# Patient Record
Sex: MALE | Race: BLACK OR AFRICAN AMERICAN | NOT HISPANIC OR LATINO | Employment: OTHER | URBAN - METROPOLITAN AREA
[De-identification: names, ages, dates, MRNs, and addresses within clinical notes are randomized per-mention and may not be internally consistent; named-entity substitution may affect disease eponyms.]

---

## 2020-03-05 ENCOUNTER — OFFICE VISIT (OUTPATIENT)
Dept: DERMATOLOGY | Facility: CLINIC | Age: 75
End: 2020-03-05
Payer: COMMERCIAL

## 2020-03-05 DIAGNOSIS — Z13.89 SCREENING FOR SKIN CONDITION: ICD-10-CM

## 2020-03-05 DIAGNOSIS — D22.9 NEVUS: ICD-10-CM

## 2020-03-05 DIAGNOSIS — D23.9 DERMATOFIBROMA: Primary | ICD-10-CM

## 2020-03-05 PROCEDURE — 99203 OFFICE O/P NEW LOW 30 MIN: CPT | Performed by: DERMATOLOGY

## 2020-03-05 RX ORDER — ALBUTEROL SULFATE 90 UG/1
2 AEROSOL, METERED RESPIRATORY (INHALATION) EVERY 6 HOURS PRN
COMMUNITY

## 2020-03-05 RX ORDER — BUDESONIDE AND FORMOTEROL FUMARATE DIHYDRATE 160; 4.5 UG/1; UG/1
2 AEROSOL RESPIRATORY (INHALATION) 2 TIMES DAILY
COMMUNITY

## 2020-03-05 NOTE — PATIENT INSTRUCTIONS
Dermatofibroma right lower leg versus nevus that appears to be regular shape and color these are growths that appear after injury may be related to trauma no further treatment needed unless further changes noted  Nevi reviewed the concept of ABCDE and ugly duckling nothing markedly atypical patient reassured  Screening for Dermatologic Disorders: Nothing else of concern noted on complete exam follow up in 1 year   Patient advised to return follow-up if any changes noted

## 2020-03-05 NOTE — PROGRESS NOTES
[address] [facility] [address]  [phone]  [phone]     MRN: [id number] : 1945  Encounter: [id number]  Patient Information: Mary Jeronimo  Chief complaint:  Skin lesions    History of present illness:  62-year-old male presents because of concerns regarding spots on his skin patient not sure how long he has had these no specific concerns or changes noted  No past medical history on file  No past surgical history on file  Social History   Social History     Substance and Sexual Activity   Alcohol Use Not Currently     Social History     Substance and Sexual Activity   Drug Use Not Currently     Social History     Tobacco Use   Smoking Status Former Smoker    Packs/day: 0 25    Years: 10 00    Pack years: 2 50    Types: Cigarettes    Last attempt to quit: 3/5/1990    Years since quittin 0   Smokeless Tobacco Never Used     No family history on file  Meds/Allergies   No Known Allergies    Meds:  Prior to Admission medications    Medication Sig Start Date End Date Taking? Authorizing Provider   albuterol (PROVENTIL HFA,VENTOLIN HFA) 90 mcg/act inhaler Inhale 2 puffs every 6 (six) hours as needed for wheezing   Yes Historical Provider, MD   budesonide-formoterol (SYMBICORT) 160-4 5 mcg/act inhaler Inhale 2 puffs 2 (two) times a day Rinse mouth after use     Yes Historical Provider, MD   CETIRIZINE HCL PO Take by mouth   Yes Historical Provider, MD       Subjective:     Review of Systems:    General: negative for - chills, fatigue, fever,  weight gain or weight loss  Psychological: negative for - anxiety, behavioral disorder, concentration difficulties, decreased libido, depression, irritability, memory difficulties, mood swings, sleep disturbances or suicidal ideation  ENT: negative for - hearing difficulties , nasal congestion, nasal discharge, oral lesions, sinus pain, sneezing, sore throat  Allergy and Immunology: negative for - hives, insect bite sensitivity,  Hematological and Lymphatic: negative for - bleeding problems, blood clots,bruising, swollen lymph nodes  Endocrine: negative for - hair pattern changes, hot flashes, malaise/lethargy, mood swings, palpitations, polydipsia/polyuria, skin changes, temperature intolerance or unexpected weight change  Respiratory: negative for - cough, hemoptysis, orthopnea, shortness of breath, or wheezing  Cardiovascular: negative for - chest pain, dyspnea on exertion, edema,  Gastrointestinal: negative for - abdominal pain, nausea/vomiting  Genito-Urinary: negative for - dysuria, incontinence, irregular/heavy menses or urinary frequency/urgency  Musculoskeletal: negative for - gait disturbance, joint pain, joint stiffness, joint swelling, muscle pain, muscular weakness  Dermatological:  As in HPI  Neurological: negative for confusion, dizziness, headaches, impaired coordination/balance, memory loss, numbness/tingling, seizures, speech problems, tremors or weakness       Objective: There were no vitals taken for this visit  Physical Exam:    General Appearance:    Alert, cooperative, no distress   Head:    Normocephalic, without obvious abnormality, atraumatic           Skin:   A full skin exam was performed including scalp, head scalp, eyes, ears, nose, lips, neck, chest, axilla, abdomen, back, buttocks, bilateral upper extremities, bilateral lower extremities, hands, feet, fingers, toes, fingernails, and toenails 7 mm hyperpigmented nodule noted on the right thigh also a 6 mm pigmented area noted scalp with normal shape color nothing else remarkable noted exam     Assessment:     1  Dermatofibroma     2  Nevus     3   Screening for skin condition           Plan:   Dermatofibroma right lower leg versus nevus that appears to be regular shape and color these are growths that appear after injury may be related to trauma no further treatment needed unless further changes noted  Nevi reviewed the concept of ABCDE and ugly duckling nothing markedly atypical patient reassured  Screening for Dermatologic Disorders: Nothing else of concern noted on complete exam follow up in 1 year   Patient advised to return follow-up if any changes noted    Stephen Lira MD  3/5/2020,11:58 AM    Portions of the record may have been created with voice recognition software   Occasional wrong word or "sound a like" substitutions may have occurred due to the inherent limitations of voice recognition software   Read the chart carefully and recognize, using context, where substitutions have occurred

## 2021-04-01 ENCOUNTER — OFFICE VISIT (OUTPATIENT)
Dept: DERMATOLOGY | Facility: CLINIC | Age: 76
End: 2021-04-01
Payer: OTHER GOVERNMENT

## 2021-04-01 VITALS — TEMPERATURE: 97.3 F

## 2021-04-01 DIAGNOSIS — D23.39 DILATED PORE OF WINER OF NOSE: ICD-10-CM

## 2021-04-01 DIAGNOSIS — Z13.89 SCREENING FOR SKIN CONDITION: ICD-10-CM

## 2021-04-01 DIAGNOSIS — L80 VITILIGO: Primary | ICD-10-CM

## 2021-04-01 DIAGNOSIS — D23.9 DERMATOFIBROMA: ICD-10-CM

## 2021-04-01 PROCEDURE — 99213 OFFICE O/P EST LOW 20 MIN: CPT | Performed by: DERMATOLOGY

## 2021-04-01 NOTE — PROGRESS NOTES
[facility] DERMATOLOGY  [address] [place] [address]  [phone]  [phone]     MRN: [id number] : 1945  Encounter: [id number]  Patient Information: Melissa Bragg  Chief complaint:  Multiple concerns    History of present illness:  62-year-old male presents for overall skin check concerns regarding a area on his nose that his basically been present for awhile that he is able to squeeze out some material but does not go away also concerned regarding growth on his abdomen and leg at that have been present for awhile that of pigmentation and also concerned regarding loss of pigmentation on his back which has been itching somewhat  No past medical history on file  No past surgical history on file  Social History   Social History     Substance and Sexual Activity   Alcohol Use Not Currently     Social History     Substance and Sexual Activity   Drug Use Not Currently     Social History     Tobacco Use   Smoking Status Former Smoker    Packs/day: 0 25    Years: 10 00    Pack years: 2 50    Types: Cigarettes    Quit date: 3/5/1990    Years since quittin 0   Smokeless Tobacco Never Used     No family history on file  Meds/Allergies   No Known Allergies    Meds:  Prior to Admission medications    Medication Sig Start Date End Date Taking? Authorizing Provider   albuterol (PROVENTIL HFA,VENTOLIN HFA) 90 mcg/act inhaler Inhale 2 puffs every 6 (six) hours as needed for wheezing   Yes Historical Provider, MD   budesonide-formoterol (SYMBICORT) 160-4 5 mcg/act inhaler Inhale 2 puffs 2 (two) times a day Rinse mouth after use     Yes Historical Provider, MD   CETIRIZINE HCL PO Take by mouth   Yes Historical Provider, MD       Subjective:     Review of Systems:    General: negative for - chills, fatigue, fever,  weight gain or weight loss  Psychological: negative for - anxiety, behavioral disorder, concentration difficulties, decreased libido, depression, irritability, memory difficulties, mood swings, sleep disturbances or suicidal ideation  ENT: negative for - hearing difficulties , nasal congestion, nasal discharge, oral lesions, sinus pain, sneezing, sore throat  Allergy and Immunology: negative for - hives, insect bite sensitivity,  Hematological and Lymphatic: negative for - bleeding problems, blood clots,bruising, swollen lymph nodes  Endocrine: negative for - hair pattern changes, hot flashes, malaise/lethargy, mood swings, palpitations, polydipsia/polyuria, skin changes, temperature intolerance or unexpected weight change  Respiratory: negative for - cough, hemoptysis, orthopnea, shortness of breath, or wheezing  Cardiovascular: negative for - chest pain, dyspnea on exertion, edema,  Gastrointestinal: negative for - abdominal pain, nausea/vomiting  Genito-Urinary: negative for - dysuria, incontinence, irregular/heavy menses or urinary frequency/urgency  Musculoskeletal: negative for - gait disturbance, joint pain, joint stiffness, joint swelling, muscle pain, muscular weakness  Dermatological:  As in HPI  Neurological: negative for confusion, dizziness, headaches, impaired coordination/balance, memory loss, numbness/tingling, seizures, speech problems, tremors or weakness       Objective:   Temp (!) 97 3 °F (36 3 °C) (Temporal)     Physical Exam:    General Appearance:    Alert, cooperative, no distress   Head:    Normocephalic, without obvious abnormality, atraumatic           Skin:   A full skin exam was performed including scalp, head scalp, eyes, ears, nose, lips, neck, chest, axilla, abdomen, back, buttocks, bilateral upper extremities, bilateral lower extremities, hands, feet, fingers, toes, fingernails, and toenails large open pore noted on the right nasal wall dome-shaped papules with hyperpigmentation with positive pinch sign hypopigmented area noted on the lower back without any rash noted     Assessment:     1  Vitiligo     2   Dermatofibroma 3  Dilated pore of Jero of nose     4  Screening for skin condition           Plan:   Vitiligo we discussed this process is an autoimmune condition at this point will hold off on therapy if it does seem to be bothersome or gets worse will go ahead treat with a topical steroid  Dermatofibroma patient again reassured these are normal growths no treatment needed  Dilated pore on the nose we discussed removal and consideration for complete removal if this is cosmetically bothersome  Screening for Dermatologic Disorders: Nothing else of concern noted on complete exam follow up in 1 year       Barrett Sanchez MD  4/1/2021,1:54 PM    Portions of the record may have been created with voice recognition software   Occasional wrong word or "sound a like" substitutions may have occurred due to the inherent limitations of voice recognition software   Read the chart carefully and recognize, using context, where substitutions have occurred

## 2021-04-01 NOTE — PATIENT INSTRUCTIONS
Vitiligo we discussed this process is an autoimmune condition at this point will hold off on therapy if it does seem to be bothersome or gets worse will go ahead treat with a topical steroid  Dermatofibroma patient again reassured these are normal growths no treatment needed  Dilated pore on the nose we discussed removal and consideration for complete removal if this is cosmetically bothersome  Screening for Dermatologic Disorders: Nothing else of concern noted on complete exam follow up in 1 year

## 2021-04-02 ENCOUNTER — TRANSCRIBE ORDERS (OUTPATIENT)
Dept: ADMINISTRATIVE | Facility: HOSPITAL | Age: 76
End: 2021-04-02

## 2021-04-02 DIAGNOSIS — R19.02 LEFT UPPER QUADRANT ABDOMINAL SWELLING, MASS AND LUMP: Primary | ICD-10-CM

## 2022-02-19 ENCOUNTER — APPOINTMENT (EMERGENCY)
Dept: CT IMAGING | Facility: HOSPITAL | Age: 77
End: 2022-02-19
Payer: COMMERCIAL

## 2022-02-19 ENCOUNTER — HOSPITAL ENCOUNTER (EMERGENCY)
Facility: HOSPITAL | Age: 77
Discharge: HOME | End: 2022-02-19
Attending: EMERGENCY MEDICINE
Payer: COMMERCIAL

## 2022-02-19 VITALS
BODY MASS INDEX: 26.2 KG/M2 | TEMPERATURE: 98.3 F | SYSTOLIC BLOOD PRESSURE: 137 MMHG | DIASTOLIC BLOOD PRESSURE: 77 MMHG | HEART RATE: 86 BPM | HEIGHT: 70 IN | OXYGEN SATURATION: 96 % | RESPIRATION RATE: 18 BRPM | WEIGHT: 183 LBS

## 2022-02-19 DIAGNOSIS — L03.211 FACIAL CELLULITIS: ICD-10-CM

## 2022-02-19 DIAGNOSIS — K12.2 CELLULITIS AND ABSCESS OF ORAL SOFT TISSUES: Primary | ICD-10-CM

## 2022-02-19 LAB
ANION GAP SERPL CALCULATED.3IONS-SCNC: 8 MMOL/L (ref 4–13)
BASOPHILS # BLD AUTO: 0.02 THOUSANDS/ΜL (ref 0–0.1)
BASOPHILS NFR BLD AUTO: 0 % (ref 0–1)
BUN SERPL-MCNC: 24 MG/DL (ref 5–25)
CALCIUM SERPL-MCNC: 8.7 MG/DL (ref 8.3–10.1)
CHLORIDE SERPL-SCNC: 104 MMOL/L (ref 100–108)
CO2 SERPL-SCNC: 27 MMOL/L (ref 21–32)
CREAT SERPL-MCNC: 1.06 MG/DL (ref 0.6–1.3)
EOSINOPHIL # BLD AUTO: 0.14 THOUSAND/ΜL (ref 0–0.61)
EOSINOPHIL NFR BLD AUTO: 2 % (ref 0–6)
ERYTHROCYTE [DISTWIDTH] IN BLOOD BY AUTOMATED COUNT: 14.6 % (ref 11.6–15.1)
GFR SERPL CREATININE-BSD FRML MDRD: 67 ML/MIN/1.73SQ M
GLUCOSE SERPL-MCNC: 104 MG/DL (ref 65–140)
HCT VFR BLD AUTO: 39.8 % (ref 36.5–49.3)
HGB BLD-MCNC: 12.5 G/DL (ref 12–17)
IMM GRANULOCYTES # BLD AUTO: 0.02 THOUSAND/UL (ref 0–0.2)
IMM GRANULOCYTES NFR BLD AUTO: 0 % (ref 0–2)
LYMPHOCYTES # BLD AUTO: 1.73 THOUSANDS/ΜL (ref 0.6–4.47)
LYMPHOCYTES NFR BLD AUTO: 21 % (ref 14–44)
MCH RBC QN AUTO: 23.3 PG (ref 26.8–34.3)
MCHC RBC AUTO-ENTMCNC: 31.4 G/DL (ref 31.4–37.4)
MCV RBC AUTO: 74 FL (ref 82–98)
MONOCYTES # BLD AUTO: 0.87 THOUSAND/ΜL (ref 0.17–1.22)
MONOCYTES NFR BLD AUTO: 10 % (ref 4–12)
NEUTROPHILS # BLD AUTO: 5.66 THOUSANDS/ΜL (ref 1.85–7.62)
NEUTS SEG NFR BLD AUTO: 67 % (ref 43–75)
NRBC BLD AUTO-RTO: 0 /100 WBCS
PLATELET # BLD AUTO: 193 THOUSANDS/UL (ref 149–390)
PMV BLD AUTO: 10.3 FL (ref 8.9–12.7)
POTASSIUM SERPL-SCNC: 3.6 MMOL/L (ref 3.5–5.3)
RBC # BLD AUTO: 5.37 MILLION/UL (ref 3.88–5.62)
SODIUM SERPL-SCNC: 139 MMOL/L (ref 136–145)
WBC # BLD AUTO: 8.44 THOUSAND/UL (ref 4.31–10.16)

## 2022-02-19 PROCEDURE — 87040 BLOOD CULTURE FOR BACTERIA: CPT | Performed by: EMERGENCY MEDICINE

## 2022-02-19 PROCEDURE — 99284 EMERGENCY DEPT VISIT MOD MDM: CPT

## 2022-02-19 PROCEDURE — 96365 THER/PROPH/DIAG IV INF INIT: CPT

## 2022-02-19 PROCEDURE — 36415 COLL VENOUS BLD VENIPUNCTURE: CPT | Performed by: EMERGENCY MEDICINE

## 2022-02-19 PROCEDURE — 10060 I&D ABSCESS SIMPLE/SINGLE: CPT | Performed by: EMERGENCY MEDICINE

## 2022-02-19 PROCEDURE — 85025 COMPLETE CBC W/AUTO DIFF WBC: CPT | Performed by: EMERGENCY MEDICINE

## 2022-02-19 PROCEDURE — 70487 CT MAXILLOFACIAL W/DYE: CPT

## 2022-02-19 PROCEDURE — 80048 BASIC METABOLIC PNL TOTAL CA: CPT | Performed by: EMERGENCY MEDICINE

## 2022-02-19 PROCEDURE — 99284 EMERGENCY DEPT VISIT MOD MDM: CPT | Performed by: EMERGENCY MEDICINE

## 2022-02-19 PROCEDURE — 96375 TX/PRO/DX INJ NEW DRUG ADDON: CPT

## 2022-02-19 PROCEDURE — 96366 THER/PROPH/DIAG IV INF ADDON: CPT

## 2022-02-19 RX ORDER — METHYLPREDNISOLONE SODIUM SUCCINATE 125 MG/2ML
125 INJECTION, POWDER, LYOPHILIZED, FOR SOLUTION INTRAMUSCULAR; INTRAVENOUS ONCE
Status: COMPLETED | OUTPATIENT
Start: 2022-02-19 | End: 2022-02-19

## 2022-02-19 RX ORDER — OXYCODONE HYDROCHLORIDE AND ACETAMINOPHEN 5; 325 MG/1; MG/1
1 TABLET ORAL EVERY 4 HOURS PRN
Qty: 15 TABLET | Refills: 0 | Status: SHIPPED | OUTPATIENT
Start: 2022-02-19 | End: 2022-02-24

## 2022-02-19 RX ORDER — KETOROLAC TROMETHAMINE 30 MG/ML
15 INJECTION, SOLUTION INTRAMUSCULAR; INTRAVENOUS ONCE
Status: COMPLETED | OUTPATIENT
Start: 2022-02-19 | End: 2022-02-19

## 2022-02-19 RX ORDER — LIDOCAINE HYDROCHLORIDE AND EPINEPHRINE 10; 10 MG/ML; UG/ML
1 INJECTION, SOLUTION INFILTRATION; PERINEURAL ONCE
Status: DISCONTINUED | OUTPATIENT
Start: 2022-02-19 | End: 2022-02-19

## 2022-02-19 RX ORDER — CHLORHEXIDINE GLUCONATE 0.12 MG/ML
15 RINSE ORAL
Qty: 473 ML | Refills: 0 | Status: SHIPPED | OUTPATIENT
Start: 2022-02-19

## 2022-02-19 RX ORDER — CLINDAMYCIN PHOSPHATE 600 MG/50ML
600 INJECTION INTRAVENOUS ONCE
Status: COMPLETED | OUTPATIENT
Start: 2022-02-19 | End: 2022-02-19

## 2022-02-19 RX ORDER — OXYCODONE HYDROCHLORIDE AND ACETAMINOPHEN 5; 325 MG/1; MG/1
1 TABLET ORAL EVERY 4 HOURS PRN
Qty: 15 TABLET | Refills: 0 | Status: SHIPPED | OUTPATIENT
Start: 2022-02-19 | End: 2022-02-19 | Stop reason: SDUPTHER

## 2022-02-19 RX ORDER — BUPIVACAINE HYDROCHLORIDE 5 MG/ML
5 INJECTION, SOLUTION EPIDURAL; INTRACAUDAL ONCE
Status: COMPLETED | OUTPATIENT
Start: 2022-02-19 | End: 2022-02-19

## 2022-02-19 RX ORDER — CLINDAMYCIN HYDROCHLORIDE 150 MG/1
450 CAPSULE ORAL EVERY 6 HOURS
Qty: 120 CAPSULE | Refills: 0 | Status: SHIPPED | OUTPATIENT
Start: 2022-02-19 | End: 2022-03-01

## 2022-02-19 RX ADMIN — CLINDAMYCIN PHOSPHATE 600 MG: 600 INJECTION, SOLUTION INTRAVENOUS at 20:23

## 2022-02-19 RX ADMIN — METHYLPREDNISOLONE SODIUM SUCCINATE 125 MG: 125 INJECTION, POWDER, FOR SOLUTION INTRAMUSCULAR; INTRAVENOUS at 18:44

## 2022-02-19 RX ADMIN — BUPIVACAINE HYDROCHLORIDE 5 ML: 5 INJECTION, SOLUTION EPIDURAL; INTRACAUDAL at 22:13

## 2022-02-19 RX ADMIN — KETOROLAC TROMETHAMINE 15 MG: 30 INJECTION, SOLUTION INTRAMUSCULAR at 22:45

## 2022-02-19 RX ADMIN — IOHEXOL 100 ML: 350 INJECTION, SOLUTION INTRAVENOUS at 19:14

## 2022-02-20 NOTE — ED PROVIDER NOTES
History  Chief Complaint   Patient presents with    Allergic Reaction     lip swelling from possible allergic reaction to nuts, started thursday      67year-old male presents lip swelling started Thursday (2 days ago)  He now presents w significant swelling to the R lip, tender to palpation, appears consistent w a dental abscess  PAtient thought this started w eating nuts on Thursday which he is allergic to - no other allergic rxn symptoms and no evidence of anaphylaxis  No difficulty breathing, no hives, no itching, no airway constriction  CT scan shows dental abscess  This is treated w ABX and drained in the ED and patient feels improved after drainage - sent out on OP ABX - advised f/u ENT  Prior to Admission Medications   Prescriptions Last Dose Informant Patient Reported? Taking? CETIRIZINE HCL PO   Yes No   Sig: Take by mouth   albuterol (PROVENTIL HFA,VENTOLIN HFA) 90 mcg/act inhaler   Yes No   Sig: Inhale 2 puffs every 6 (six) hours as needed for wheezing   budesonide-formoterol (SYMBICORT) 160-4 5 mcg/act inhaler   Yes No   Sig: Inhale 2 puffs 2 (two) times a day Rinse mouth after use  Facility-Administered Medications: None       Past Medical History:   Diagnosis Date    Diabetes mellitus ([facility] [address] )        History reviewed  No pertinent surgical history  History reviewed  No pertinent family history  I have reviewed and agree with the history as documented  E-Cigarette/Vaping     E-Cigarette/Vaping Substances     Social History     Tobacco Use    Smoking status: Former Smoker     Packs/day: 0 25     Years: 10 00     Pack years: 2 50     Types: Cigarettes     Quit date: 3/5/1990     Years since quittin 0    Smokeless tobacco: Never Used   Substance Use Topics    Alcohol use: Not Currently    Drug use: Not Currently       Review of Systems   Constitutional: Negative for chills and fever  HENT: Positive for dental problem   Negative for sinus pressure, sinus pain and sore throat  Right-sided facial swelling   Respiratory: Negative for shortness of breath and stridor  Cardiovascular: Negative for chest pain  Gastrointestinal: Negative for abdominal pain, nausea and vomiting  Skin:        Erythema to right face   All other systems reviewed and are negative  Physical Exam  Physical Exam  Vitals reviewed  Constitutional:       General: He is not in acute distress  Appearance: He is well-developed  He is not diaphoretic  HENT:      Head: Normocephalic and atraumatic  Ears:      Comments: ears appear normal   external auditory canals patent without erythema or edema bilaterally  TM grey/flat bilaterally  nose normal inspection, no deformity, nares patent bilaterally  no septal hematoma, no epistaxis  mucous membranes moist, pink  tongue midline without edema  uvula midline without deviation  posterior pharynx widely patent  no posterior erythema  tonsils without edema, erythema or purulent exudate  no tongue or lip swelling present  The R upper dentition has an area of erythema and enamel erosion  R sided dental abscess present w firm to palpation, abscess approx 3cm  No active drainage  No sublingual or submandibular fullness or swelling  No trismus  No drooling or pooling of secretions  Eyes:      Conjunctiva/sclera: Conjunctivae normal    Cardiovascular:      Rate and Rhythm: Normal rate and regular rhythm  Pulmonary:      Effort: Pulmonary effort is normal  No respiratory distress  Breath sounds: Normal breath sounds  No stridor  No wheezing or rhonchi  Musculoskeletal:         General: Normal range of motion  Cervical back: Normal range of motion and neck supple  No rigidity  Lymphadenopathy:      Cervical: No cervical adenopathy  Skin:     General: Skin is warm and dry  Coloration: Skin is not pale  Findings: Erythema (R face) present     Neurological:      Mental Status: He is alert and oriented to person, place, and time  Cranial Nerves: No cranial nerve deficit  Psychiatric:         Behavior: Behavior normal          Vital Signs  ED Triage Vitals   Temperature Pulse Respirations Blood Pressure SpO2   02/19/22 1726 02/19/22 1723 02/19/22 1723 02/19/22 1723 02/19/22 1723   98 3 °F (36 8 °C) (!) 114 18 130/81 99 %      Temp Source Heart Rate Source Patient Position - Orthostatic VS BP Location FiO2 (%)   02/19/22 1726 02/19/22 1723 02/19/22 1723 02/19/22 1723 --   Oral Monitor Sitting Left arm       Pain Score       --                  Vitals:    02/19/22 1928 02/19/22 2000 02/19/22 2100 02/19/22 2131   BP: 140/78 144/73 139/79 137/77   Pulse: 85 81 83 86   Patient Position - Orthostatic VS: Sitting   Lying         Visual Acuity      ED Medications  Medications   methylPREDNISolone sodium succinate (Solu-MEDROL) injection 125 mg (125 mg Intravenous Given 2/19/22 1844)   iohexol (OMNIPAQUE) 350 MG/ML injection (SINGLE-DOSE) 100 mL (100 mL Intravenous Given 2/19/22 1914)   clindamycin (CLEOCIN) IVPB (premix in dextrose) 600 mg 50 mL (0 mg Intravenous Stopped 2/19/22 2257)   bupivacaine (PF) (MARCAINE) 0 5 % injection 5 mL (5 mL Infiltration Given 2/19/22 2213)   ketorolac (TORADOL) injection 15 mg (15 mg Intravenous Given 2/19/22 2245)       Diagnostic Studies  Results Reviewed     Procedure Component Value Units Date/Time    Blood culture [[id number]] Collected: 02/19/22 1841    Lab Status: Final result Specimen: Blood Updated: 02/25/22 0101     Blood Culture No Growth After 5 Days  Blood culture [[id number]] Collected: 02/19/22 1841    Lab Status: Final result Specimen: Blood Updated: 02/25/22 0101     Blood Culture No Growth After 5 Days      CBC and differential [[id number]]  (Abnormal) Collected: 02/19/22 1841    Lab Status: Final result Specimen: Blood Updated: 02/19/22 1919     WBC 8 44 Thousand/uL      RBC 5 37 Million/uL      Hemoglobin 12 5 g/dL      Hematocrit 39 8 %      MCV 74 fL      MCH 23 3 pg      MCHC 31 4 g/dL      RDW 14 6 %      MPV 10 3 fL      Platelets 549 Thousands/uL      nRBC 0 /100 WBCs      Neutrophils Relative 67 %      Immat GRANS % 0 %      Lymphocytes Relative 21 %      Monocytes Relative 10 %      Eosinophils Relative 2 %      Basophils Relative 0 %      Neutrophils Absolute 5 66 Thousands/µL      Immature Grans Absolute 0 02 Thousand/uL      Lymphocytes Absolute 1 73 Thousands/µL      Monocytes Absolute 0 87 Thousand/µL      Eosinophils Absolute 0 14 Thousand/µL      Basophils Absolute 0 02 Thousands/µL     Basic metabolic panel [[id number]] Collected: 02/19/22 1841    Lab Status: Final result Specimen: Blood Updated: 02/19/22 1857     Sodium 139 mmol/L      Potassium 3 6 mmol/L      Chloride 104 mmol/L      CO2 27 mmol/L      ANION GAP 8 mmol/L      BUN 24 mg/dL      Creatinine 1 06 mg/dL      Glucose 104 mg/dL      Calcium 8 7 mg/dL      eGFR 67 ml/min/1 73sq m     Narrative:      [place] guidelines for Chronic Kidney Disease (CKD):     Stage 1 with normal or high GFR (GFR > 90 mL/min/1 73 square meters)    Stage 2 Mild CKD (GFR = 60-89 mL/min/1 73 square meters)    Stage 3A Moderate CKD (GFR = 45-59 mL/min/1 73 square meters)    Stage 3B Moderate CKD (GFR = 30-44 mL/min/1 73 square meters)    Stage 4 Severe CKD (GFR = 15-29 mL/min/1 73 square meters)    Stage 5 End Stage CKD (GFR <15 mL/min/1 73 square meters)  Note: GFR calculation is accurate only with a steady state creatinine                 CT facial bones with contrast   ED Interpretation by Leeanne Dickerson DO (02/19 2010)   Right first maxillary premolar tooth periapical abscess with erosion along the outer cortex with associated 2 cm soft tissue fluid collection/abscess along the outer table of the maxilla      Soft tissue swelling along the right face suggesting cellulitis        Final Result by Lior Kim MD (02/19 2005)      Right first maxillary premolar tooth periapical abscess with erosion along the outer cortex with associated 2 cm soft tissue fluid collection/abscess along the outer table of the maxilla  Soft tissue swelling along the right face suggesting cellulitis  Workstation performed: [id number]                    Procedures  Nerve block    Date/Time: 2/19/2022 10:56 PM  Performed by: Stephanie Farmer DO  Authorized by: Stephanie Farmer DO     Patient location:  ED  Universal Protocol:  Consent given by: patient  Patient identity confirmed: verbally with patient      Indications:     Indications:  Pain relief  Location:     Body area:  Head    Nerve type:  Peripheral    Laterality:  Right (superior alveolar)  Procedure details (see MAR for exact dosages): Block needle gauge:  27 G    Anesthetic injected:  Bupivacaine 0 5% w/o epi    Steroid injected:  None    Additive injected:  None    Injection procedure:  Anatomic landmarks identified, anatomic landmarks palpated, incremental injection, introduced needle and negative aspiration for blood  Post-procedure details:     Dressing:  None    Outcome:  Pain relieved    Patient tolerance of procedure: Tolerated well, no immediate complications  Incision and drain    Date/Time: 2/19/2022 10:57 PM  Performed by: Stephanie Farmer DO  Authorized by: Stephanie Farmer DO   Universal Protocol:  Consent given by: patient  Patient identity confirmed: verbally with patient      Patient location:  ED  Location:     Type:  Abscess    Location:  Mouth    Location Detail:  Intraoral    Mouth location: l upper dental apical abscess extending into cheek  Anesthesia (see MAR for exact dosages): Anesthesia method:  Local infiltration    Local anesthetic:  Bupivacaine 0 5% w/o epi  Procedure details:     Complexity:  Simple    Incision types:  Stab incision    Scalpel blade:  11    Approach:  Open    Incision depth:  Submucosal    Drainage:  Purulent    Drainage amount:   Moderate  Post-procedure details:     Patient tolerance of procedure: Tolerated well, no immediate complications             ED Course                               SBIRT 22yo+      Most Recent Value   SBIRT (22 yo +)    In order to provide better care to our patients, we are screening all of our patients for alcohol and drug use  Would it be okay to ask you these screening questions? No Filed at: 02/19/2022 0864                    MDM  Number of Diagnoses or Management Options  Cellulitis and abscess of oral soft tissues  Facial cellulitis  Diagnosis management comments: Dental abscess with facial cellulitis  Incision and drainage successful, patient feels improved after the incision and drainage  Treated with IV clinda here and discharged home on clindamycin to follow-up with ear nose throat doctor outpatient  Discharged in stable condition  Amount and/or Complexity of Data Reviewed  Clinical lab tests: ordered and reviewed  Tests in the radiology section of CPT®: ordered and reviewed        Disposition  Final diagnoses:   Cellulitis and abscess of oral soft tissues   Facial cellulitis     Time reflects when diagnosis was documented in both MDM as applicable and the Disposition within this note     Time User Action Codes Description Comment    2/19/2022 10:38 PM aMriana Brewer Add [K12 2] Cellulitis and abscess of oral soft tissues     2/19/2022 10:38 PM Nata Aden Add [A85 012] Facial cellulitis       ED Disposition     ED Disposition Condition Date/Time Comment    Discharge Stable Sat Feb 19, 2022 10:38 PM Isaak Rodriguez discharge to home/self care              Follow-up Information     Follow up With Specialties Details Why Contact Info Additional Information    [address] [facility] Emergency Department Emergency Medicine  If symptoms worsen [address] [facility] [id number] [facility] Emergency Department, [address], [place], [address] Deidre Osman MD Neurology Schedule an appointment as soon as possible for a visit  For follow up to ensure improvement, and for further testing and treatment as needed [address] [facility]  [phone]       [address] [place] [address] Throat Otolaryngology Schedule an appointment as soon as possible for a visit  For follow up to ensure improvement, and for further testing and treatment as needed [address] [facility], [address] [place] [address], [address] [place], [facility] [address]          Discharge Medication List as of 2/19/2022 10:43 PM      START taking these medications    Details   chlorhexidine (PERIDEX) 0 12 % solution Apply 15 mL to the mouth or throat 4 (four) times a day (with meals and at bedtime) After meals and before bed, Starting Sat 2/19/2022, Print      clindamycin (CLEOCIN) 150 mg capsule Take 3 capsules (450 mg total) by mouth every 6 (six) hours for 10 days, Starting Sat 2/19/2022, Until Tue 3/1/2022, Print      oxyCODONE-acetaminophen (PERCOCET) 5-325 mg per tablet Take 1 tablet by mouth every 4 (four) hours as needed for severe pain for up to 5 days Max Daily Amount: 6 tablets, Starting Sat 2/19/2022, Until Thu 2/24/2022 at 2359, Print         CONTINUE these medications which have NOT CHANGED    Details   albuterol (PROVENTIL HFA,VENTOLIN HFA) 90 mcg/act inhaler Inhale 2 puffs every 6 (six) hours as needed for wheezing, Historical Med      budesonide-formoterol (SYMBICORT) 160-4 5 mcg/act inhaler Inhale 2 puffs 2 (two) times a day Rinse mouth after use , Historical Med      CETIRIZINE HCL PO Take by mouth, Historical Med             No discharge procedures on file      PDMP Review     None          ED Provider  Electronically Signed by           Peng Rinaldi DO  03/07/22 6360

## 2022-02-25 LAB
BACTERIA BLD CULT: NORMAL
BACTERIA BLD CULT: NORMAL

## 2022-04-12 PROBLEM — K04.7 DENTAL INFECTION: Status: ACTIVE | Noted: 2022-04-12

## 2022-04-22 ENCOUNTER — TELEPHONE (OUTPATIENT)
Dept: OBGYN CLINIC | Facility: OTHER | Age: 77
End: 2022-04-22

## 2022-04-22 NOTE — TELEPHONE ENCOUNTER
email sent to [facility]    Hello!!    I have a patient that is being referred by the [place] to come see Dr Regulo Pryor for Bilateral Hand Trigger Fingers    Patient is :  Geraldo Perkins   : [birth year]-  MRN : [id number]  C/b # [id number]  [facility] Referral in Chart   Reason for Appointment : Trigger Finger Bilateral Hands  Requested Doctor & Location : Dr Regulo Pryor @ [facility]    Thank you    [facility]

## 2022-05-19 NOTE — TELEPHONE ENCOUNTER
([facility] Refl is not in the system - lmom for pt to contact [place] to have it generated or faxed to us or bring w/him  Asked him to cb to confirm what the status of the refrl is)     FYI - the [place] refrls that are in the system are for other body parts w/other providers   There is none for Ortho

## 2022-06-02 ENCOUNTER — OFFICE VISIT (OUTPATIENT)
Dept: OBGYN CLINIC | Facility: CLINIC | Age: 77
End: 2022-06-02
Payer: COMMERCIAL

## 2022-06-02 VITALS
RESPIRATION RATE: 16 BRPM | HEIGHT: 69 IN | BODY MASS INDEX: 27.28 KG/M2 | HEART RATE: 72 BPM | SYSTOLIC BLOOD PRESSURE: 122 MMHG | DIASTOLIC BLOOD PRESSURE: 76 MMHG | WEIGHT: 184.2 LBS

## 2022-06-02 DIAGNOSIS — M65.341 TRIGGER FINGER, RIGHT RING FINGER: Primary | ICD-10-CM

## 2022-06-02 PROCEDURE — 99204 OFFICE O/P NEW MOD 45 MIN: CPT | Performed by: SURGERY

## 2022-06-02 PROCEDURE — 20550 NJX 1 TENDON SHEATH/LIGAMENT: CPT | Performed by: SURGERY

## 2022-06-02 RX ORDER — DEXAMETHASONE SODIUM PHOSPHATE 100 MG/10ML
40 INJECTION INTRAMUSCULAR; INTRAVENOUS
Status: COMPLETED | OUTPATIENT
Start: 2022-06-02 | End: 2022-06-02

## 2022-06-02 RX ORDER — DOXYCYCLINE HYCLATE 50 MG/1
324 CAPSULE, GELATIN COATED ORAL
COMMUNITY

## 2022-06-02 RX ORDER — LIDOCAINE HYDROCHLORIDE 10 MG/ML
1 INJECTION, SOLUTION INFILTRATION; PERINEURAL
Status: COMPLETED | OUTPATIENT
Start: 2022-06-02 | End: 2022-06-02

## 2022-06-02 RX ADMIN — DEXAMETHASONE SODIUM PHOSPHATE 40 MG: 100 INJECTION INTRAMUSCULAR; INTRAVENOUS at 13:14

## 2022-06-02 RX ADMIN — LIDOCAINE HYDROCHLORIDE 1 ML: 10 INJECTION, SOLUTION INFILTRATION; PERINEURAL at 13:14

## 2022-06-02 NOTE — PROGRESS NOTES
Dionicio HOWELL  Attending, Orthopaedic Surgery  Hand, Wrist, and Elbow Surgery  [facility]      ORTHOPAEDIC HAND, WRIST, AND ELBOW OFFICE  VISIT       ASSESSMENT/PLAN:      59-year-old male with right ring trigger finger  Patient and I discussed the etiology of this condition  I did discuss with him non operative treatment of corticosteroid injection  Patient elected to proceed with this injection  Injection was tolerated well  We did discuss keeping the fingers mobile will help with reduction of swelling  He may continue activities as tolerated  I will see him back in 6 weeks  The patient verbalized understanding of exam findings and treatment plan  We engaged in the shared decision-making process and treatment options were discussed at length with the patient  Surgical and conservative management discussed today along with risks and benefits  There are no diagnoses linked to this encounter  Follow Up:  Return in about 6 weeks (around 7/14/2022)  To Do Next Visit:  Re-evaluation of current issue      General Discussions:  Trigger Finger: The anatomy and physiology of trigger finger was discussed with the patient today in the office  Edema and increased contact pressure within the flexor tendons at the A1 pulley can cause pain, crepitation, and triggering or locking of the digit resulting in limitation of function  Symptoms can occur at anytime but are typically worse in the morning or after a brief rest from repetitive activity  Treatment options include resting/nighttime MP blocking splints to decrease edema, oral anti-inflammatory medications, home or formal therapy exercises, up to 2 steroid injections within the tendon sheath, or surgical release  While majority of patients do respond to conservative treatment, up to 20% may require surgical release       Operative Discussions:        ____________________________________________________________________________________________________________________________________________      CHIEF COMPLAINT:  Chief Complaint   Patient presents with    Right Ring Finger - Locking, Pain, Clicking       SUBJECTIVE:  Zaki Howe is a 67y o  year old RHD male who presents the office today for an evaluation of his right ring finger  Patient states he has been having locking of his right ring finger since February  He notes at that time he was pulling up this sheet on his bed  Patient notes finger locks intermittently during the day  Does have limited range of motion secondary to pain and swelling of the finger  He does not pain at the A1 pulley  He reports at times needed to use the other hand to unlock the finger  He denies any new injury or trauma        Pain/symptom timing:  Worse during the day when active  Pain/symptom context:  Worse with activites and work  Pain/symptom modifying factors:  Rest makes better, activities make worse  Pain/symptom associated signs/symptoms: none    Prior treatment   · NSAIDsNo   · Injections No   · Bracing/Orthotics No    Physical Therapy No     I have personally reviewed all the relevant PMH, PSH, SH, FH, Medications and allergies      PAST MEDICAL HISTORY:  Past Medical History:   Diagnosis Date    BPH (benign prostatic hyperplasia)     Diabetes mellitus ([facility] [address] )     GERD (gastroesophageal reflux disease)     Hypertension        PAST SURGICAL HISTORY:  Past Surgical History:   Procedure Laterality Date    HERNIA REPAIR      TONSILLECTOMY         FAMILY HISTORY:  Family History   Problem Relation Age of Onset    No Known Problems Mother     No Known Problems Father        SOCIAL HISTORY:  Social History     Tobacco Use    Smoking status: Former Smoker     Packs/day: 0 25     Years: 10 00     Pack years: 2 50     Types: Cigarettes     Quit date: 3/5/1990     Years since quittin 2    Smokeless tobacco: Never Used   Vaping Use    Vaping Use: Never used   Substance Use Topics    Alcohol use: Not Currently    Drug use: Not Currently       MEDICATIONS:    Current Outpatient Medications:     albuterol (PROVENTIL HFA,VENTOLIN HFA) 90 mcg/act inhaler, Inhale 2 puffs every 6 (six) hours as needed for wheezing, Disp: , Rfl:     aspirin (ECOTRIN LOW STRENGTH) 81 mg EC tablet, 81 mg 2 (two) times a week, Disp: , Rfl:     budesonide-formoterol (SYMBICORT) 160-4 5 mcg/act inhaler, Inhale 2 puffs 2 (two) times a day Rinse mouth after use , Disp: , Rfl:     CETIRIZINE HCL PO, Take 1 tablet by mouth in the morning, Disp: , Rfl:     Cholecalciferol 50 MCG (2000 UT) TABS, TAKE ONE TABLET BY MOUTH EVERY DAY (FOR LOW VITAMIN D), Disp: , Rfl:     Coenzyme Q10 100 MG TABS, TAKE 400MG  BY MOUTH EVERY MORNING, Disp: , Rfl:     ferrous gluconate (FERGON) 324 mg tablet, Take 324 mg by mouth daily with breakfast, Disp: , Rfl:     finasteride (PROSCAR) 5 mg tablet, Take 5 mg by mouth daily, Disp: , Rfl:     fluticasone (FLONASE) 50 mcg/act nasal spray, INSTILL 2 SPRAYS IN NOSTRIL(S) EVERY DAY AS NEEDED FOR NASAL CONGESTION, Disp: , Rfl:     latanoprost (XALATAN) 0 005 % ophthalmic solution, INSTILL 1 DROP IN BOTH EYES AT BEDTIME FOR GLAUCOMA-PROTECT BOTTLE FROM LIGHT , Disp: , Rfl:     lisinopril (ZESTRIL) 5 mg tablet, Take 5 mg by mouth, Disp: , Rfl:     metFORMIN (GLUCOPHAGE) 500 mg tablet, Take 500 mg by mouth daily with breakfast, Disp: , Rfl:     Multiple Vitamin (MULTIVITAMINS PO), TAKE ONE CAP/TAB BY MOUTH EVERY DAY FOR SUPPLEMENTATION, Disp: , Rfl:     omeprazole (PriLOSEC) 20 mg delayed release capsule, 20 mg, Disp: , Rfl:     chlorhexidine (PERIDEX) 0 12 % solution, Apply 15 mL to the mouth or throat 4 (four) times a day (with meals and at bedtime) After meals and before bed (Patient not taking: Reported on 6/2/2022), Disp: 473 mL, Rfl: 0    ketoconazole (NIZORAL) 2 % cream, APPLY LIBERALLY TOPICALLY EVERY DAY FOR FUNGAL INFECTION (Patient not taking: Reported on 6/2/2022), Disp: , Rfl:     ALLERGIES:  Allergies   Allergen Reactions    Nuts - Food Allergy Anaphylaxis    Haloperidol Wheezing    Oxybutynin Hives    Penicillin G Wheezing           REVIEW OF SYSTEMS:  Review of Systems   Constitutional: Negative for chills, fever and unexpected weight change  HENT: Negative for hearing loss, nosebleeds and sore throat  Eyes: Negative for pain, redness and visual disturbance  Respiratory: Negative for cough, shortness of breath and wheezing  Cardiovascular: Negative for chest pain, palpitations and leg swelling  Gastrointestinal: Negative for abdominal pain, nausea and vomiting  Endocrine: Negative for polydipsia and polyuria  Genitourinary: Negative for dysuria and hematuria  Musculoskeletal: Negative for arthralgias, joint swelling and myalgias  Skin: Negative for rash and wound  Neurological: Negative for dizziness, numbness and headaches  Psychiatric/Behavioral: Negative for agitation, decreased concentration and suicidal ideas         VITALS:  Vitals:    06/02/22 1250   BP: 122/76   Pulse: 72   Resp: 16       LABS:  HgA1c: No results found for: HGBA1C  BMP:   Lab Results   Component Value Date    CALCIUM 8 7 02/19/2022    K 3 6 02/19/2022    CO2 27 02/19/2022     02/19/2022    BUN 24 02/19/2022    CREATININE 1 06 02/19/2022       _____________________________________________________  PHYSICAL EXAMINATION:  General: well developed and well nourished, alert, oriented times 3 and appears comfortable  Psychiatric: Normal  HEENT: Normocephalic, Atraumatic Trachea Midline, No torticollis  Pulmonary: No audible wheezing or respiratory distress   Abdomen/GI: Non tender, non distended   Cardiovascular: No pitting edema, 2+ radial pulse   Skin: No masses, erythema, lacerations, fluctation, ulcerations  Neurovascular: Sensation Intact to the Median, Ulnar, Radial Nerve, Motor Intact to the Median, Ulnar, Radial Nerve and Pulses Intact  Musculoskeletal: Normal, except as noted in detailed exam and in HPI  MUSCULOSKELETAL EXAMINATION:    Right ring finger: skin intact  No erythema or ecchymosis noted  Mild swelling at the PIP joint, tender at A1 pulley, no active triggering, limited motion secondary to pain, sensation intact to the radial and ulnar aspect of the digit, brisk capillary refill noted   ___________________________________________________  STUDIES REVIEWED:  None today       PROCEDURES PERFORMED:  Hand/upper extremity injection: R ring A1  Universal Protocol:  Consent: Verbal consent obtained    Consent given by: patient  Patient identity confirmed: verbally with patient    Supporting Documentation  Indications: pain   Procedure Details  Condition:trigger finger Location: ring finger - R ring A1   Preparation: Patient was prepped and draped in the usual sterile fashion  Needle size: 25 G  Ultrasound guidance: no  Medications administered: 1 mL lidocaine 1 %; 40 mg dexamethasone 100 mg/10 mL    Patient tolerance: patient tolerated the procedure well with no immediate complications  Dressing:  Sterile dressing applied              _____________________________________________________      Scribe Attestation    I,:  Piotr Santos am acting as a scribe while in the presence of the attending physician :       I,:  Ivonne Valadez MD personally performed the services described in this documentation    as scribed in my presence :

## 2022-07-14 ENCOUNTER — OFFICE VISIT (OUTPATIENT)
Dept: OBGYN CLINIC | Facility: CLINIC | Age: 77
End: 2022-07-14
Payer: COMMERCIAL

## 2022-07-14 VITALS
WEIGHT: 186 LBS | DIASTOLIC BLOOD PRESSURE: 76 MMHG | HEIGHT: 69 IN | RESPIRATION RATE: 17 BRPM | SYSTOLIC BLOOD PRESSURE: 124 MMHG | HEART RATE: 77 BPM | BODY MASS INDEX: 27.55 KG/M2

## 2022-07-14 DIAGNOSIS — M65.341 TRIGGER FINGER, RIGHT RING FINGER: Primary | ICD-10-CM

## 2022-07-14 DIAGNOSIS — M65.342 TRIGGER RING FINGER OF LEFT HAND: ICD-10-CM

## 2022-07-14 PROCEDURE — 99214 OFFICE O/P EST MOD 30 MIN: CPT | Performed by: SURGERY

## 2022-07-14 PROCEDURE — 20550 NJX 1 TENDON SHEATH/LIGAMENT: CPT | Performed by: SURGERY

## 2022-07-14 RX ORDER — DEXAMETHASONE SODIUM PHOSPHATE 100 MG/10ML
40 INJECTION INTRAMUSCULAR; INTRAVENOUS
Status: COMPLETED | OUTPATIENT
Start: 2022-07-14 | End: 2022-07-14

## 2022-07-14 RX ORDER — LIDOCAINE HYDROCHLORIDE 10 MG/ML
1 INJECTION, SOLUTION INFILTRATION; PERINEURAL
Status: COMPLETED | OUTPATIENT
Start: 2022-07-14 | End: 2022-07-14

## 2022-07-14 RX ADMIN — LIDOCAINE HYDROCHLORIDE 1 ML: 10 INJECTION, SOLUTION INFILTRATION; PERINEURAL at 13:12

## 2022-07-14 RX ADMIN — DEXAMETHASONE SODIUM PHOSPHATE 40 MG: 100 INJECTION INTRAMUSCULAR; INTRAVENOUS at 13:12

## 2022-07-14 NOTE — PROGRESS NOTES
ASSESSMENT/PLAN:      65year old male here for his bilateral ring trigger fingers  A 2nd injection was provided for the right ring trigger finger and a 1st injection was provided for the left ring trigger finger today  He tolerted the injections well  We will follow up in 6 weeks to re-evaluate  The patient verbalized understanding of exam findings and treatment plan  We engaged in the shared decision-making process and treatment options were discussed at length with the patient  Surgical and conservative management discussed today along with risks and benefits  Diagnoses and all orders for this visit:    Trigger finger, right ring finger  -     Hand/upper extremity injection: R ring A1    Trigger ring finger of left hand  -     Hand/upper extremity injection: L ring A1        Follow Up:  Return in about 6 weeks (around 8/25/2022) for bilateral ring trigger fingers  To Do Next Visit:  Re-evaluation of current issue    ____________________________________________________________________________________________________________________________________________      CHIEF COMPLAINT:  Chief Complaint   Patient presents with    Right Ring Finger - Follow-up, Locking, Pain    Left Ring Finger - Pain, Locking       SUBJECTIVE:  Lyle Ratliff is a 65y o  year old RHD male who presents today for 6 week follow-up for his right ring trigger finger  His last visit on 06/02/2022 a right ring trigger finger injection was provided that didn't get full relief  He felt that after 2-3 weeks, he noted that the ring finger started to locked up when pulling the bed sheets  The left ring finger is starting to click  I have personally reviewed all the relevant PMH, PSH, SH, FH, Medications and allergies       PAST MEDICAL HISTORY:  Past Medical History:   Diagnosis Date    BPH (benign prostatic hyperplasia)     Diabetes mellitus ([facility])     GERD (gastroesophageal reflux disease)     Hypertension        PAST SURGICAL HISTORY:  Past Surgical History:   Procedure Laterality Date    HERNIA REPAIR      TONSILLECTOMY         FAMILY HISTORY:  Family History   Problem Relation Age of Onset    No Known Problems Mother     No Known Problems Father        SOCIAL HISTORY:  Social History     Tobacco Use    Smoking status: Former Smoker     Packs/day: 0 25     Years: 10 00     Pack years: 2 50     Types: Cigarettes     Quit date: 3/5/1990     Years since quittin 3    Smokeless tobacco: Never Used   Vaping Use    Vaping Use: Never used   Substance Use Topics    Alcohol use: Not Currently    Drug use: Not Currently       MEDICATIONS:    Current Outpatient Medications:     albuterol (PROVENTIL HFA,VENTOLIN HFA) 90 mcg/act inhaler, Inhale 2 puffs every 6 (six) hours as needed for wheezing, Disp: , Rfl:     aspirin (ECOTRIN LOW STRENGTH) 81 mg EC tablet, 81 mg 2 (two) times a week, Disp: , Rfl:     budesonide-formoterol (SYMBICORT) 160-4 5 mcg/act inhaler, Inhale 2 puffs 2 (two) times a day Rinse mouth after use , Disp: , Rfl:     CETIRIZINE HCL PO, Take 1 tablet by mouth in the morning, Disp: , Rfl:     Cholecalciferol 50 MCG (2000 UT) TABS, TAKE ONE TABLET BY MOUTH EVERY DAY (FOR LOW VITAMIN D), Disp: , Rfl:     Coenzyme Q10 100 MG TABS, TAKE 400MG  BY MOUTH EVERY MORNING, Disp: , Rfl:     ferrous gluconate (FERGON) 324 mg tablet, Take 324 mg by mouth daily with breakfast, Disp: , Rfl:     finasteride (PROSCAR) 5 mg tablet, Take 5 mg by mouth daily, Disp: , Rfl:     fluticasone (FLONASE) 50 mcg/act nasal spray, INSTILL 2 SPRAYS IN NOSTRIL(S) EVERY DAY AS NEEDED FOR NASAL CONGESTION, Disp: , Rfl:     latanoprost (XALATAN) 0 005 % ophthalmic solution, INSTILL 1 DROP IN BOTH EYES AT BEDTIME FOR GLAUCOMA-PROTECT BOTTLE FROM LIGHT , Disp: , Rfl:     lisinopril (ZESTRIL) 5 mg tablet, Take 5 mg by mouth, Disp: , Rfl:     metFORMIN (GLUCOPHAGE) 500 mg tablet, Take 500 mg by mouth daily with breakfast, Disp: , Rfl:   Multiple Vitamin (MULTIVITAMINS PO), TAKE ONE CAP/TAB BY MOUTH EVERY DAY FOR SUPPLEMENTATION, Disp: , Rfl:     omeprazole (PriLOSEC) 20 mg delayed release capsule, 20 mg, Disp: , Rfl:     chlorhexidine (PERIDEX) 0 12 % solution, Apply 15 mL to the mouth or throat 4 (four) times a day (with meals and at bedtime) After meals and before bed (Patient not taking: No sig reported), Disp: 473 mL, Rfl: 0    ketoconazole (NIZORAL) 2 % cream, APPLY LIBERALLY TOPICALLY EVERY DAY FOR FUNGAL INFECTION (Patient not taking: No sig reported), Disp: , Rfl:     ALLERGIES:  Allergies   Allergen Reactions    Nuts - Food Allergy Anaphylaxis    Haloperidol Wheezing    Oxybutynin Hives    Penicillin G Wheezing       REVIEW OF SYSTEMS:  Review of Systems   Constitutional: Negative for chills, fever and unexpected weight change  HENT: Negative for hearing loss, nosebleeds and sore throat  Eyes: Negative for pain, redness and visual disturbance  Respiratory: Negative for cough, shortness of breath and wheezing  Cardiovascular: Negative for chest pain, palpitations and leg swelling  Gastrointestinal: Negative for abdominal pain, nausea and vomiting  Endocrine: Negative for polydipsia and polyuria  Genitourinary: Negative for dysuria and hematuria  Musculoskeletal: Positive for myalgias  Skin: Negative for rash and wound  Neurological: Negative for dizziness, light-headedness and headaches  Psychiatric/Behavioral: Negative for decreased concentration, dysphoric mood and suicidal ideas  The patient is not nervous/anxious          VITALS:  Vitals:    07/14/22 1242   BP: 124/76   Pulse: 77   Resp: 17       LABS:  HgA1c: No results found for: HGBA1C  BMP:   Lab Results   Component Value Date    CALCIUM 8 7 02/19/2022    K 3 6 02/19/2022    CO2 27 02/19/2022     02/19/2022    BUN 24 02/19/2022    CREATININE 1 06 02/19/2022       _____________________________________________________  PHYSICAL EXAMINATION:  General: well developed and well nourished, alert, oriented times 3 and appears comfortable  Psychiatric: Normal  HEENT: Normocephalic, Atraumatic Trachea Midline, No torticollis  Pulmonary: No audible wheezing or respiratory distress   Cardiovascular: No pitting edema, 2+ radial pulse   Abdominal/GI: abdomen non tender, non distended   Skin: No masses, erythema, lacerations, fluctation, ulcerations  Neurovascular: Sensation Intact to the Median, Ulnar, Radial Nerve, Motor Intact to the Median, Ulnar, Radial Nerve and Pulses Intact  Musculoskeletal: Normal, except as noted in detailed exam and in HPI  MUSCULOSKELETAL EXAMINATION:    right ring finger:  Negative palpable nodule over the A1 pulley  Positive tenderness to palpation over A1 pulley  Positive catching  Positive clicking  left ring finger:  Positive palpable nodule over the A1 pulley  Positive tenderness to palpation over A1 pulley  Negative catching  Positive clicking     ___________________________________________________  STUDIES REVIEWED:  No images reviewed at today's visit      PROCEDURES PERFORMED:  Hand/upper extremity injection: R ring A1  Universal Protocol:  Consent: Verbal consent obtained  Risks and benefits: risks, benefits and alternatives were discussed  Consent given by: patient  Time out: Immediately prior to procedure a "time out" was called to verify the correct patient, procedure, equipment, support staff and site/side marked as required    Timeout called at: 7/14/2022 1:11 PM   Patient understanding: patient states understanding of the procedure being performed  Site marked: the operative site was marked  Patient identity confirmed: verbally with patient    Supporting Documentation  Indications: tendon swelling and pain   Procedure Details  Condition:trigger finger Location: ring finger - R ring A1   Preparation: Patient was prepped and draped in the usual sterile fashion  Needle size: 25 G  Ultrasound guidance: no  Approach: volar  Medications administered: 1 mL lidocaine 1 %; 40 mg dexamethasone 100 mg/10 mL    Patient tolerance: patient tolerated the procedure well with no immediate complications  Dressing:  Sterile dressing applied     Hand/upper extremity injection: L ring A1  Universal Protocol:  Consent: Verbal consent obtained  Risks and benefits: risks, benefits and alternatives were discussed  Consent given by: patient  Time out: Immediately prior to procedure a "time out" was called to verify the correct patient, procedure, equipment, support staff and site/side marked as required    Timeout called at: 7/14/2022 1:11 PM   Patient understanding: patient states understanding of the procedure being performed  Site marked: the operative site was marked  Patient identity confirmed: verbally with patient    Supporting Documentation  Indications: tendon swelling and pain   Procedure Details  Condition:trigger finger Location: ring finger - L ring A1   Preparation: Patient was prepped and draped in the usual sterile fashion  Needle size: 25 G  Ultrasound guidance: no  Approach: volar  Medications administered: 1 mL lidocaine 1 %; 40 mg dexamethasone 100 mg/10 mL    Patient tolerance: patient tolerated the procedure well with no immediate complications  Dressing:  Sterile dressing applied         _____________________________________________________      Scribe Attestation    I,:  Cha Brock am acting as a scribe while in the presence of the attending physician :       I,:  George Ponce MD personally performed the services described in this documentation    as scribed in my presence :

## 2022-07-27 ENCOUNTER — TELEPHONE (OUTPATIENT)
Dept: OBGYN CLINIC | Facility: HOSPITAL | Age: 77
End: 2022-07-27

## 2022-07-27 NOTE — TELEPHONE ENCOUNTER
Patient called to have "proof of attendance" faxed to the [place] for travel reimbursement    The OVNs for both the 06/02/2022 and the 07/14/2022 OVs were faxed, as requested to the [place] at [phone]

## 2022-08-26 ENCOUNTER — HOSPITAL ENCOUNTER (EMERGENCY)
Facility: HOSPITAL | Age: 77
Discharge: HOME | End: 2022-08-26
Attending: EMERGENCY MEDICINE
Payer: COMMERCIAL

## 2022-08-26 VITALS
OXYGEN SATURATION: 97 % | TEMPERATURE: 97.5 F | HEART RATE: 96 BPM | SYSTOLIC BLOOD PRESSURE: 145 MMHG | RESPIRATION RATE: 18 BRPM | DIASTOLIC BLOOD PRESSURE: 78 MMHG

## 2022-08-26 DIAGNOSIS — K43.9 VENTRAL HERNIA: Primary | ICD-10-CM

## 2022-08-26 PROCEDURE — 99282 EMERGENCY DEPT VISIT SF MDM: CPT | Performed by: EMERGENCY MEDICINE

## 2022-08-26 PROCEDURE — 99283 EMERGENCY DEPT VISIT LOW MDM: CPT

## 2022-08-26 NOTE — ED PROVIDER NOTES
History  Chief Complaint   Patient presents with    Abdominal Pain     Pt presents with c/o buldge in R groin      He is a very nice 65-year-old male that presents emergency department for the evaluation and treatment of a right lower abdominal hernia  He 1st felt something different about 5 days ago  He feels a bulge in the area that is reducible with pushing on it  It is nontender there is no nausea no vomiting no fever no chills  He has been doing some workouts 3 times a week with some weights and no one of the times he his belt became lose any felt just some mild discomfort in that area  Past medical history of BPH, prediabetes  Surgical history, left inguinal hernia repair  Patient does not smoke or drink      History provided by:  Patient   used: No    Abdominal Pain  Associated symptoms: no chest pain, no chills, no cough, no dysuria, no fever, no hematuria, no nausea, no shortness of breath, no sore throat and no vomiting        Prior to Admission Medications   Prescriptions Last Dose Informant Patient Reported? Taking? CETIRIZINE HCL PO  Self Yes No   Sig: Take 1 tablet by mouth in the morning   Cholecalciferol 50 MCG (2000 UT) TABS  Self Yes No   Sig: TAKE ONE TABLET BY MOUTH EVERY DAY (FOR LOW VITAMIN D)   Coenzyme Q10 100 MG TABS  Self Yes No   Sig: TAKE 400MG  BY MOUTH EVERY MORNING   Multiple Vitamin (MULTIVITAMINS PO)  Self Yes No   Sig: TAKE ONE CAP/TAB BY MOUTH EVERY DAY FOR SUPPLEMENTATION   albuterol (PROVENTIL HFA,VENTOLIN HFA) 90 mcg/act inhaler  Self Yes No   Sig: Inhale 2 puffs every 6 (six) hours as needed for wheezing   aspirin (ECOTRIN LOW STRENGTH) 81 mg EC tablet  Self Yes No   Si mg 2 (two) times a week   budesonide-formoterol (SYMBICORT) 160-4 5 mcg/act inhaler  Self Yes No   Sig: Inhale 2 puffs 2 (two) times a day Rinse mouth after use     chlorhexidine (PERIDEX) 0 12 % solution  Self No No   Sig: Apply 15 mL to the mouth or throat 4 (four) times a day (with meals and at bedtime) After meals and before bed   Patient not taking: No sig reported   ferrous gluconate (FERGON) 324 mg tablet  Self Yes No   Sig: Take 324 mg by mouth daily with breakfast   finasteride (PROSCAR) 5 mg tablet  Self Yes No   Sig: Take 5 mg by mouth daily   fluticasone (FLONASE) 50 mcg/act nasal spray  Self Yes No   Sig: INSTILL 2 SPRAYS IN NOSTRIL(S) EVERY DAY AS NEEDED FOR NASAL CONGESTION   ketoconazole (NIZORAL) 2 % cream  Self Yes No   Sig: APPLY LIBERALLY TOPICALLY EVERY DAY FOR FUNGAL INFECTION   Patient not taking: No sig reported   latanoprost (XALATAN) 0 005 % ophthalmic solution  Self Yes No   Sig: INSTILL 1 DROP IN BOTH EYES AT BEDTIME FOR GLAUCOMA-PROTECT BOTTLE FROM LIGHT  lisinopril (ZESTRIL) 5 mg tablet  Self Yes No   Sig: Take 5 mg by mouth   metFORMIN (GLUCOPHAGE) 500 mg tablet  Self Yes No   Sig: Take 500 mg by mouth daily with breakfast   omeprazole (PriLOSEC) 20 mg delayed release capsule  Self Yes No   Si mg      Facility-Administered Medications: None       Past Medical History:   Diagnosis Date    BPH (benign prostatic hyperplasia)     Diabetes mellitus (HCC)     GERD (gastroesophageal reflux disease)     Hypertension        Past Surgical History:   Procedure Laterality Date    HERNIA REPAIR      TONSILLECTOMY         Family History   Problem Relation Age of Onset    No Known Problems Mother     No Known Problems Father      I have reviewed and agree with the history as documented      E-Cigarette/Vaping    E-Cigarette Use Never User      E-Cigarette/Vaping Substances    Nicotine No      Social History     Tobacco Use    Smoking status: Former Smoker     Packs/day: 0 25     Years: 10 00     Pack years: 2 50     Types: Cigarettes     Quit date: 3/5/1990     Years since quittin 4    Smokeless tobacco: Never Used   Vaping Use    Vaping Use: Never used   Substance Use Topics    Alcohol use: Not Currently    Drug use: Not Currently       Review of Systems   Constitutional: Negative for chills and fever  HENT: Negative for ear pain and sore throat  Eyes: Negative for pain and visual disturbance  Respiratory: Negative for cough and shortness of breath  Cardiovascular: Negative for chest pain and palpitations  Gastrointestinal: Positive for abdominal pain  Negative for nausea and vomiting  Genitourinary: Negative for dysuria and hematuria  Musculoskeletal: Negative for arthralgias and back pain  Skin: Negative for color change and rash  Neurological: Negative for seizures and syncope  All other systems reviewed and are negative  Physical Exam  Physical Exam  Vitals and nursing note reviewed  Constitutional:       Appearance: He is well-developed  HENT:      Head: Normocephalic and atraumatic  Eyes:      Extraocular Movements: Extraocular movements intact  Conjunctiva/sclera: Conjunctivae normal       Pupils: Pupils are equal, round, and reactive to light  Cardiovascular:      Rate and Rhythm: Normal rate and regular rhythm  Heart sounds: No murmur heard  Pulmonary:      Effort: Pulmonary effort is normal  No respiratory distress  Breath sounds: Normal breath sounds  Abdominal:      General: Abdomen is flat  Palpations: Abdomen is soft  Tenderness: There is no abdominal tenderness  Comments: The patient does have hernia the strep use with standing and Valsalva  He has Sohail reducible  No inflammation no tenderness   Musculoskeletal:      Cervical back: Neck supple  Skin:     General: Skin is warm and dry  Capillary Refill: Capillary refill takes less than 2 seconds  Neurological:      General: No focal deficit present  Mental Status: He is alert and oriented to person, place, and time     Psychiatric:         Mood and Affect: Mood normal          Behavior: Behavior normal          Vital Signs  ED Triage Vitals [08/26/22 1255]   Temperature Pulse Respirations Blood Pressure SpO2 97 5 °F (36 4 °C) 96 18 145/78 97 %      Temp Source Heart Rate Source Patient Position - Orthostatic VS BP Location FiO2 (%)   Tympanic Monitor Sitting Left arm --      Pain Score       --           Vitals:    08/26/22 1255   BP: 145/78   Pulse: 96   Patient Position - Orthostatic VS: Sitting         Visual Acuity      ED Medications  Medications - No data to display    Diagnostic Studies  Results Reviewed     None                 No orders to display              Procedures  Procedures         ED Course                               SBIRT 22yo+    Flowsheet Row Most Recent Value   SBIRT (23 yo +)    In order to provide better care to our patients, we are screening all of our patients for alcohol and drug use  Would it be okay to ask you these screening questions? Yes Filed at: 08/26/2022 1500   Initial Alcohol Screen: US AUDIT-C     1  How often do you have a drink containing alcohol? 0 Filed at: 08/26/2022 1500   2  How many drinks containing alcohol do you have on a typical day you are drinking? 0 Filed at: 08/26/2022 1500   3a  Male UNDER 65: How often do you have five or more drinks on one occasion? 0 Filed at: 08/26/2022 1500   3b  FEMALE Any Age, or MALE 65+: How often do you have 4 or more drinks on one occassion? 0 Filed at: 08/26/2022 1500   Audit-C Score 0 Filed at: 08/26/2022 1500   RENZO: How many times in the past year have you    Used an illegal drug or used a prescription medication for non-medical reasons?  Never Filed at: 08/26/2022 1500                    MDM  Number of Diagnoses or Management Options  Risk of Complications, Morbidity, and/or Mortality  General comments: Clinically well  No fever no chills   NO PAIN    Patient Progress  Patient progress: stable      Disposition  Final diagnoses:   Ventral hernia     Time reflects when diagnosis was documented in both MDM as applicable and the Disposition within this note     Time User Action Codes Description Comment    8/26/2022  3:07 PM Priyank Rhodes Add [K43 9] Ventral hernia       ED Disposition     ED Disposition   Discharge    Condition   Stable    Date/Time   Fri Aug 26, 2022  3:07 PM    Comment   Asya Vickers discharge to home/self care  Follow-up Information     Follow up With Specialties Details Why Contact Info        [facility]          Patient's Medications   Discharge Prescriptions    No medications on file       No discharge procedures on file      PDMP Review     None          ED Provider  Electronically Signed by           Daryn Pollack MD  08/26/22 2474

## 2022-08-26 NOTE — DISCHARGE INSTRUCTIONS
A  personal message from Dr Chelsey Peacock,  Thank you so much for allowing me to care for you today  I pride myself in the care and attention I give all my patients  I hope you were a witness to this tonight  If for any reason your condition does not improve, worsens, or you have a question that was not answered during your visit you can feel free to text me on my personal phone   # [phone]  I will answer to your message and continue your care past your emergency room visit

## 2022-09-01 ENCOUNTER — OFFICE VISIT (OUTPATIENT)
Dept: OBGYN CLINIC | Facility: CLINIC | Age: 77
End: 2022-09-01
Payer: COMMERCIAL

## 2022-09-01 ENCOUNTER — TELEPHONE (OUTPATIENT)
Dept: OBGYN CLINIC | Facility: HOSPITAL | Age: 77
End: 2022-09-01

## 2022-09-01 VITALS
WEIGHT: 186.6 LBS | SYSTOLIC BLOOD PRESSURE: 122 MMHG | BODY MASS INDEX: 27.64 KG/M2 | RESPIRATION RATE: 16 BRPM | HEART RATE: 76 BPM | HEIGHT: 69 IN | DIASTOLIC BLOOD PRESSURE: 81 MMHG

## 2022-09-01 DIAGNOSIS — M65.341 TRIGGER FINGER, RIGHT RING FINGER: ICD-10-CM

## 2022-09-01 DIAGNOSIS — M65.342 TRIGGER RING FINGER OF LEFT HAND: Primary | ICD-10-CM

## 2022-09-01 PROCEDURE — 20550 NJX 1 TENDON SHEATH/LIGAMENT: CPT | Performed by: SURGERY

## 2022-09-01 PROCEDURE — 99214 OFFICE O/P EST MOD 30 MIN: CPT | Performed by: SURGERY

## 2022-09-01 RX ORDER — DEXAMETHASONE SODIUM PHOSPHATE 100 MG/10ML
40 INJECTION INTRAMUSCULAR; INTRAVENOUS
Status: COMPLETED | OUTPATIENT
Start: 2022-09-01 | End: 2022-09-01

## 2022-09-01 RX ORDER — LIDOCAINE HYDROCHLORIDE 10 MG/ML
1 INJECTION, SOLUTION INFILTRATION; PERINEURAL
Status: COMPLETED | OUTPATIENT
Start: 2022-09-01 | End: 2022-09-01

## 2022-09-01 RX ADMIN — LIDOCAINE HYDROCHLORIDE 1 ML: 10 INJECTION, SOLUTION INFILTRATION; PERINEURAL at 15:03

## 2022-09-01 RX ADMIN — DEXAMETHASONE SODIUM PHOSPHATE 40 MG: 100 INJECTION INTRAMUSCULAR; INTRAVENOUS at 15:03

## 2022-09-01 NOTE — PROGRESS NOTES
ASSESSMENT/PLAN:      65 y o  male with bilateral ring trigger fingers, right ring trigger finger resolved  The decision was made to proceed with a 2nd left ring trigger finger CSI  The CSI was performed in the office without complication  Post injection protocol/expectations were reviewed  If the CSI does not resolve the triggering, a left ring trigger finger release may be discussed  Follow up in the office in 8 weeks time for re-evaluation  The patient verbalized understanding of exam findings and treatment plan  We engaged in the shared decision-making process and treatment options were discussed at length with the patient  Surgical and conservative management discussed today along with risks and benefits  Diagnoses and all orders for this visit:    Trigger ring finger of left hand  -     Hand/upper extremity injection: L ring A1    Trigger finger, right ring finger      Follow Up:  Return in about 8 weeks (around 10/27/2022)  To Do Next Visit:  Re-evaluation of current issue    ____________________________________________________________________________________________________________________________________________      CHIEF COMPLAINT:  Chief Complaint   Patient presents with    Right Ring Finger - Pain, Follow-up, Locking    Left Ring Finger - Pain, Locking, Follow-up       SUBJECTIVE:  Tate Rodriguez is a 65y o  year old RHD male who presents to the office today for a follow up regarding bilateral ring trigger fingers  He underwent 2 right ring trigger finger CSI's  He underwent 1 left ring trigger finger CSI  I have personally reviewed all the relevant PMH, PSH, SH, FH, Medications and allergies       PAST MEDICAL HISTORY:  Past Medical History:   Diagnosis Date    BPH (benign prostatic hyperplasia)     Diabetes mellitus (HCC)     GERD (gastroesophageal reflux disease)     Hypertension        PAST SURGICAL HISTORY:  Past Surgical History:   Procedure Laterality Date    HERNIA REPAIR      TONSILLECTOMY         FAMILY HISTORY:  Family History   Problem Relation Age of Onset    No Known Problems Mother     No Known Problems Father        SOCIAL HISTORY:  Social History     Tobacco Use    Smoking status: Former Smoker     Packs/day: 0 25     Years: 10 00     Pack years: 2 50     Types: Cigarettes     Quit date: 3/5/1990     Years since quittin 5    Smokeless tobacco: Never Used   Vaping Use    Vaping Use: Never used   Substance Use Topics    Alcohol use: Not Currently    Drug use: Not Currently       MEDICATIONS:    Current Outpatient Medications:     albuterol (PROVENTIL HFA,VENTOLIN HFA) 90 mcg/act inhaler, Inhale 2 puffs every 6 (six) hours as needed for wheezing, Disp: , Rfl:     aspirin (ECOTRIN LOW STRENGTH) 81 mg EC tablet, 81 mg 2 (two) times a week, Disp: , Rfl:     budesonide-formoterol (SYMBICORT) 160-4 5 mcg/act inhaler, Inhale 2 puffs 2 (two) times a day Rinse mouth after use , Disp: , Rfl:     CETIRIZINE HCL PO, Take 1 tablet by mouth in the morning, Disp: , Rfl:     Cholecalciferol 50 MCG (2000 UT) TABS, TAKE ONE TABLET BY MOUTH EVERY DAY (FOR LOW VITAMIN D), Disp: , Rfl:     Coenzyme Q10 100 MG TABS, TAKE 400MG  BY MOUTH EVERY MORNING, Disp: , Rfl:     ferrous gluconate (FERGON) 324 mg tablet, Take 324 mg by mouth daily with breakfast, Disp: , Rfl:     finasteride (PROSCAR) 5 mg tablet, Take 5 mg by mouth daily, Disp: , Rfl:     fluticasone (FLONASE) 50 mcg/act nasal spray, INSTILL 2 SPRAYS IN NOSTRIL(S) EVERY DAY AS NEEDED FOR NASAL CONGESTION, Disp: , Rfl:     latanoprost (XALATAN) 0 005 % ophthalmic solution, INSTILL 1 DROP IN BOTH EYES AT BEDTIME FOR GLAUCOMA-PROTECT BOTTLE FROM LIGHT , Disp: , Rfl:     lisinopril (ZESTRIL) 5 mg tablet, Take 5 mg by mouth, Disp: , Rfl:     metFORMIN (GLUCOPHAGE) 500 mg tablet, Take 500 mg by mouth daily with breakfast, Disp: , Rfl:     Multiple Vitamin (MULTIVITAMINS PO), TAKE ONE CAP/TAB BY MOUTH EVERY DAY FOR SUPPLEMENTATION, Disp: , Rfl:     omeprazole (PriLOSEC) 20 mg delayed release capsule, 20 mg, Disp: , Rfl:     chlorhexidine (PERIDEX) 0 12 % solution, Apply 15 mL to the mouth or throat 4 (four) times a day (with meals and at bedtime) After meals and before bed (Patient not taking: No sig reported), Disp: 473 mL, Rfl: 0    ketoconazole (NIZORAL) 2 % cream, APPLY LIBERALLY TOPICALLY EVERY DAY FOR FUNGAL INFECTION (Patient not taking: No sig reported), Disp: , Rfl:     ALLERGIES:  Allergies   Allergen Reactions    Nuts - Food Allergy Anaphylaxis    Haloperidol Wheezing    Oxybutynin Hives    Penicillin G Wheezing       REVIEW OF SYSTEMS:  Review of Systems   Constitutional: Negative for chills, fever and unexpected weight change  HENT: Negative for hearing loss, nosebleeds and sore throat  Eyes: Negative for pain, redness and visual disturbance  Respiratory: Negative for cough, shortness of breath and wheezing  Cardiovascular: Negative for chest pain, palpitations and leg swelling  Gastrointestinal: Negative for abdominal pain, nausea and vomiting  Endocrine: Negative for polydipsia and polyuria  Genitourinary: Negative for difficulty urinating and hematuria  Musculoskeletal: Negative for arthralgias, joint swelling and myalgias  Skin: Negative for rash and wound  Neurological: Negative for dizziness, numbness and headaches  Psychiatric/Behavioral: Negative for decreased concentration, dysphoric mood and suicidal ideas  The patient is not nervous/anxious          VITALS:  Vitals:    09/01/22 1436   BP: 122/81   Pulse: 76   Resp: 16       LABS:  HgA1c: No results found for: HGBA1C  BMP:   Lab Results   Component Value Date    CALCIUM 8 7 02/19/2022    K 3 6 02/19/2022    CO2 27 02/19/2022     02/19/2022    BUN 24 02/19/2022    CREATININE 1 06 02/19/2022       _____________________________________________________  PHYSICAL EXAMINATION:  General: well developed and well nourished, alert, oriented times 3 and appears comfortable  Psychiatric: Normal  HEENT: Normocephalic, Atraumatic Trachea Midline, No torticollis  Pulmonary: No audible wheezing or respiratory distress   Cardiovascular: No pitting edema, 2+ radial pulse   Abdominal/GI: abdomen non tender, non distended   Skin: No masses, erythema, lacerations, fluctation, ulcerations  Neurovascular: Sensation Intact to the Median, Ulnar, Radial Nerve, Motor Intact to the Median, Ulnar, Radial Nerve and Pulses Intact  Musculoskeletal: Normal, except as noted in detailed exam and in HPI  MUSCULOSKELETAL EXAMINATION:    left ring finger:  Positive palpable nodule over the A1 pulley  Positive tenderness to palpation over A1 pulley  Negative catching  Positive clicking       ___________________________________________________  STUDIES REVIEWED:  No new imaging to review           PROCEDURES PERFORMED:  Hand/upper extremity injection: L ring A1  Universal Protocol:  Consent: Verbal consent obtained  Written consent not obtained  Risks and benefits: risks, benefits and alternatives were discussed  Consent given by: patient  Time out: Immediately prior to procedure a "time out" was called to verify the correct patient, procedure, equipment, support staff and site/side marked as required    Site marked: the operative site was marked  Patient identity confirmed: verbally with patient    Supporting Documentation  Indications: pain   Procedure Details  Condition:trigger finger Location: ring finger - L ring A1   Preparation: Patient was prepped and draped in the usual sterile fashion  Needle size: 25 G  Ultrasound guidance: no  Medications administered: 1 mL lidocaine 1 %; 40 mg dexamethasone 100 mg/10 mL    Patient tolerance: patient tolerated the procedure well with no immediate complications  Dressing:  Sterile dressing applied               _____________________________________________________      Obey Ko I,:  Alex Beard Mago am acting as a scribe while in the presence of the attending physician :       I,:  Brigitte Moore MD personally performed the services described in this documentation    as scribed in my presence :

## 2022-09-09 ENCOUNTER — TELEPHONE (OUTPATIENT)
Dept: SURGERY | Facility: CLINIC | Age: 77
End: 2022-09-09

## 2022-09-09 NOTE — TELEPHONE ENCOUNTER
As per Haja Sandoval is not referri/ authorizing  patient to be seen out of the [place]  [facility] at 9:30  I called to cx appt   Pt is aware and was going to call us

## 2022-10-18 ENCOUNTER — CONSULT (OUTPATIENT)
Dept: GASTROENTEROLOGY | Facility: AMBULARY SURGERY CENTER | Age: 77
End: 2022-10-18
Payer: COMMERCIAL

## 2022-10-18 VITALS
HEART RATE: 82 BPM | SYSTOLIC BLOOD PRESSURE: 138 MMHG | HEIGHT: 69 IN | OXYGEN SATURATION: 99 % | DIASTOLIC BLOOD PRESSURE: 84 MMHG | WEIGHT: 191 LBS | BODY MASS INDEX: 28.29 KG/M2

## 2022-10-18 DIAGNOSIS — R10.32 LLQ PAIN: ICD-10-CM

## 2022-10-18 DIAGNOSIS — K86.89 DILATED PANCREATIC DUCT: Primary | ICD-10-CM

## 2022-10-18 PROCEDURE — 99204 OFFICE O/P NEW MOD 45 MIN: CPT | Performed by: INTERNAL MEDICINE

## 2022-10-18 NOTE — PROGRESS NOTES
Consultation - [facility]) [facility]  Shanel Hinson 65 y o  male MRN: [id number]          Assessment & Plan:    58-year-old gentleman, seen for left lower quadrant pain discomfort, had a CT scan which incidentally found 3 mm diffusely dilated pancreatic duct  No prior history pancreatic disease    1  Pancreatic ductal dilatation:  With no history of underlying pancreatic disease or pancreatitis  -would prefer MRI however patient is claustrophobic, he has history of triggering with loud noises   -we will schedule patient for an EUS to further evaluate  -reassure the patient that finding overall is not concerning but should be evaluated for possible main duct IPMN versus obstructive lesion  -discussed with him risks of procedure including bleeding, surgery, perforation    2  Left lower quadrant abdominal discomfort: Most likely secondary to constipation   -recommended daily fiber supplement  -he is scheduled have a colonoscopy to exclude luminal pathology at the [facility] was seen today for consult  Diagnoses and all orders for this visit:    Dilated pancreatic duct  -     Endoscopic ultrasonography, GI (Upper); Future    LLQ pain    Other orders  -     Diet NPO; Sips with meds; Standing  -     Void on call to OR; Standing            _____________________________________________________________        CC:  Abnormal CT scan    HPI:  Shanel Hinson is a 65 y o male who was referred for evaluation of abnormal CT scan  This is a pleasant 58-year-old gentleman, history of hypertension, diabetes, reports vague left lower quadrant abdominal discomfort, was seen by his PCP at the [facility], had a CT scan, this demonstrated diffuse pancreatic duct dilatation to 3 mm without any focal lesion  This was found incidentally  Patient was noted to have large stool burden as well    He was referred for evaluation pancreatic ductal dilatation    Patient denies any prior history of pancreatitis, denies any history of heavy alcohol abuse in the past, denies any history of biliary issues     Otherwise fairly healthy    Surgical history is only notable for left inguinal hernia repair  Quit smoking many years ago, drinks about 1 to 2 drinks per week  Retired   Family history is noted above for sister with Crohn's disease             ROS:  The remainder of the ROS was negative except for the pertinent positives mentioned in HPI           Allergies: Nuts - food allergy, Haloperidol, Oxybutynin, and Penicillin g    Medications:   Current Outpatient Medications:   •  albuterol (PROVENTIL HFA,VENTOLIN HFA) 90 mcg/act inhaler, Inhale 2 puffs every 6 (six) hours as needed for wheezing, Disp: , Rfl:   •  aspirin (ECOTRIN LOW STRENGTH) 81 mg EC tablet, 81 mg 2 (two) times a week, Disp: , Rfl:   •  budesonide-formoterol (SYMBICORT) 160-4 5 mcg/act inhaler, Inhale 2 puffs 2 (two) times a day Rinse mouth after use , Disp: , Rfl:   •  CETIRIZINE HCL PO, Take 1 tablet by mouth in the morning, Disp: , Rfl:   •  Cholecalciferol 50 MCG (2000 UT) TABS, TAKE ONE TABLET BY MOUTH EVERY DAY (FOR LOW VITAMIN D), Disp: , Rfl:   •  Coenzyme Q10 100 MG TABS, TAKE 400MG  BY MOUTH EVERY MORNING, Disp: , Rfl:   •  ferrous gluconate (FERGON) 324 mg tablet, Take 324 mg by mouth daily with breakfast, Disp: , Rfl:   •  finasteride (PROSCAR) 5 mg tablet, Take 5 mg by mouth daily, Disp: , Rfl:   •  fluticasone (FLONASE) 50 mcg/act nasal spray, INSTILL 2 SPRAYS IN NOSTRIL(S) EVERY DAY AS NEEDED FOR NASAL CONGESTION, Disp: , Rfl:   •  latanoprost (XALATAN) 0 005 % ophthalmic solution, INSTILL 1 DROP IN BOTH EYES AT BEDTIME FOR GLAUCOMA-PROTECT BOTTLE FROM LIGHT , Disp: , Rfl:   •  lisinopril (ZESTRIL) 5 mg tablet, Take 5 mg by mouth, Disp: , Rfl:   •  metFORMIN (GLUCOPHAGE) 500 mg tablet, Take 500 mg by mouth daily with breakfast, Disp: , Rfl:   •  Multiple Vitamin (MULTIVITAMINS PO), TAKE ONE CAP/TAB BY MOUTH EVERY DAY FOR SUPPLEMENTATION, Disp: , Rfl:   •  omeprazole (PriLOSEC) 20 mg delayed release capsule, 20 mg, Disp: , Rfl:   •  chlorhexidine (PERIDEX) 0 12 % solution, Apply 15 mL to the mouth or throat 4 (four) times a day (with meals and at bedtime) After meals and before bed (Patient not taking: No sig reported), Disp: 473 mL, Rfl: 0  •  ketoconazole (NIZORAL) 2 % cream, APPLY LIBERALLY TOPICALLY EVERY DAY FOR FUNGAL INFECTION (Patient not taking: No sig reported), Disp: , Rfl: '    Past Medical History:   Diagnosis Date   • BPH (benign prostatic hyperplasia)    • Diabetes mellitus ([facility] [address] )    • GERD (gastroesophageal reflux disease)    • Hypertension        Past Surgical History:   Procedure Laterality Date   • COLONOSCOPY     • HERNIA REPAIR     • TONSILLECTOMY     • UPPER GASTROINTESTINAL ENDOSCOPY         Family History   Problem Relation Age of Onset   • Lupus Mother    • No Known Problems Father    • Crohn's disease Sister         reports that he quit smoking about 32 years ago  His smoking use included cigarettes  He has a 2 50 pack-year smoking history  He has never used smokeless tobacco  He reports previous alcohol use  He reports previous drug use            Physical Exam:     /84 (BP Location: Left arm, Patient Position: Sitting, Cuff Size: Standard)   Pulse 82   Ht 5' 9" (1 753 m)   Wt 86 6 kg (191 lb)   SpO2 99%   BMI 28 21 kg/m²     Gen: wn/wd, NAD, healthy-appearing gentleman  HEENT: anicteric, MMM, no cervical LAD  CVS: RRR, no m/r/g  CHEST: CTA b/l  ABD: +BS, soft, NT,ND, no hepatosplenomegaly  EXT: no c/c/e  NEURO: aaox3  SKIN: NO rashes

## 2022-10-18 NOTE — LETTER
October 18, 2022     [place] [id number] [place] [id number]    Patient: Luis Nazario   YOB: 1945   Date of Visit: 10/18/2022       Dear Dr Francisco J Ramirez:    Thank you for referring Luis Nazario to me for evaluation  Below are my notes for this consultation  If you have questions, please do not hesitate to call me  I look forward to following your patient along with you  Sincerely,        Augie Castro MD        CC: MD Augie Bernardo MD  10/18/2022  4:46 PM  Sign when Signing Visit  Consultation - [address] [facility]  Luis Nazario 65 y o  male MRN: [id number]          Assessment & Plan:    78-year-old gentleman, seen for left lower quadrant pain discomfort, had a CT scan which incidentally found 3 mm diffusely dilated pancreatic duct  No prior history pancreatic disease    1  Pancreatic ductal dilatation:  With no history of underlying pancreatic disease or pancreatitis  -would prefer MRI however patient is claustrophobic, he has history of triggering with loud noises   -we will schedule patient for an EUS to further evaluate  -reassure the patient that finding overall is not concerning but should be evaluated for possible main duct IPMN versus obstructive lesion  -discussed with him risks of procedure including bleeding, surgery, perforation    2  Left lower quadrant abdominal discomfort: Most likely secondary to constipation   -recommended daily fiber supplement  -he is scheduled have a colonoscopy to exclude luminal pathology at the [facility] was seen today for consult  Diagnoses and all orders for this visit:    Dilated pancreatic duct  -     Endoscopic ultrasonography, GI (Upper);  Future    LLQ pain    Other orders  -     Diet NPO; Sips with meds; Standing  -     Void on call to OR; Standing            _____________________________________________________________        CC: Abnormal CT scan    HPI:  Idalia Milton is a 65 y o male who was referred for evaluation of abnormal CT scan  This is a pleasant 65-year-old gentleman, history of hypertension, diabetes, reports vague left lower quadrant abdominal discomfort, was seen by his PCP at the [facility], had a CT scan, this demonstrated diffuse pancreatic duct dilatation to 3 mm without any focal lesion  This was found incidentally  Patient was noted to have large stool burden as well  He was referred for evaluation pancreatic ductal dilatation    Patient denies any prior history of pancreatitis, denies any history of heavy alcohol abuse in the past, denies any history of biliary issues     Otherwise fairly healthy    Surgical history is only notable for left inguinal hernia repair  Quit smoking many years ago, drinks about 1 to 2 drinks per week  Retired   Family history is noted above for sister with Crohn's disease             ROS:  The remainder of the ROS was negative except for the pertinent positives mentioned in HPI           Allergies: Nuts - food allergy, Haloperidol, Oxybutynin, and Penicillin g    Medications:   Current Outpatient Medications:   •  albuterol (PROVENTIL HFA,VENTOLIN HFA) 90 mcg/act inhaler, Inhale 2 puffs every 6 (six) hours as needed for wheezing, Disp: , Rfl:   •  aspirin (ECOTRIN LOW STRENGTH) 81 mg EC tablet, 81 mg 2 (two) times a week, Disp: , Rfl:   •  budesonide-formoterol (SYMBICORT) 160-4 5 mcg/act inhaler, Inhale 2 puffs 2 (two) times a day Rinse mouth after use , Disp: , Rfl:   •  CETIRIZINE HCL PO, Take 1 tablet by mouth in the morning, Disp: , Rfl:   •  Cholecalciferol 50 MCG (2000 UT) TABS, TAKE ONE TABLET BY MOUTH EVERY DAY (FOR LOW VITAMIN D), Disp: , Rfl:   •  Coenzyme Q10 100 MG TABS, TAKE 400MG  BY MOUTH EVERY MORNING, Disp: , Rfl:   •  ferrous gluconate (FERGON) 324 mg tablet, Take 324 mg by mouth daily with breakfast, Disp: , Rfl:   •  finasteride (PROSCAR) 5 mg tablet, Take 5 mg by mouth daily, Disp: , Rfl:   •  fluticasone (FLONASE) 50 mcg/act nasal spray, INSTILL 2 SPRAYS IN NOSTRIL(S) EVERY DAY AS NEEDED FOR NASAL CONGESTION, Disp: , Rfl:   •  latanoprost (XALATAN) 0 005 % ophthalmic solution, INSTILL 1 DROP IN BOTH EYES AT BEDTIME FOR GLAUCOMA-PROTECT BOTTLE FROM LIGHT , Disp: , Rfl:   •  lisinopril (ZESTRIL) 5 mg tablet, Take 5 mg by mouth, Disp: , Rfl:   •  metFORMIN (GLUCOPHAGE) 500 mg tablet, Take 500 mg by mouth daily with breakfast, Disp: , Rfl:   •  Multiple Vitamin (MULTIVITAMINS PO), TAKE ONE CAP/TAB BY MOUTH EVERY DAY FOR SUPPLEMENTATION, Disp: , Rfl:   •  omeprazole (PriLOSEC) 20 mg delayed release capsule, 20 mg, Disp: , Rfl:   •  chlorhexidine (PERIDEX) 0 12 % solution, Apply 15 mL to the mouth or throat 4 (four) times a day (with meals and at bedtime) After meals and before bed (Patient not taking: No sig reported), Disp: 473 mL, Rfl: 0  •  ketoconazole (NIZORAL) 2 % cream, APPLY LIBERALLY TOPICALLY EVERY DAY FOR FUNGAL INFECTION (Patient not taking: No sig reported), Disp: , Rfl: '    Past Medical History:   Diagnosis Date   • BPH (benign prostatic hyperplasia)    • Diabetes mellitus (HCC)    • GERD (gastroesophageal reflux disease)    • Hypertension        Past Surgical History:   Procedure Laterality Date   • COLONOSCOPY     • HERNIA REPAIR     • TONSILLECTOMY     • UPPER GASTROINTESTINAL ENDOSCOPY         Family History   Problem Relation Age of Onset   • Lupus Mother    • No Known Problems Father    • Crohn's disease Sister         reports that he quit smoking about 32 years ago  His smoking use included cigarettes  He has a 2 50 pack-year smoking history  He has never used smokeless tobacco  He reports previous alcohol use  He reports previous drug use            Physical Exam:     /84 (BP Location: Left arm, Patient Position: Sitting, Cuff Size: Standard)   Pulse 82   Ht 5' 9" (1 753 m)   Wt 86 6 kg (191 lb)   SpO2 99%   BMI 28 21 kg/m²     Gen: wn/wd, NAD, healthy-appearing gentleman  HEENT: anicteric, MMM, no cervical LAD  CVS: RRR, no m/r/g  CHEST: CTA b/l  ABD: +BS, soft, NT,ND, no hepatosplenomegaly  EXT: no c/c/e  NEURO: aaox3  SKIN: NO rashes

## 2022-10-18 NOTE — PATIENT INSTRUCTIONS
Take 1 tablespoon of metamucil/benefiber/citrucel daily    Procedure: EUS  Scheduled date of procedure (as of today): 12/19/2022  Physician performing procedure: Dr Julia Fuentes  [facility] of procedure[facility]  [facility]  Instructions reviewed with patient by:  Nik Hartley  Clearances:  None

## 2022-10-19 ENCOUNTER — TELEPHONE (OUTPATIENT)
Dept: GASTROENTEROLOGY | Facility: AMBULARY SURGERY CENTER | Age: 77
End: 2022-10-19

## 2022-10-19 NOTE — TELEPHONE ENCOUNTER
Patient is scheduled for EUS on December 19 , 2022 at [facility] with Lev Argueta MD  Patient is aware of pre-procedure prep of Nothing to eat after midnight, day of the procedure clear liquids only and nothing to drink 4 hours prior to the EGD and they will be called the day prior between 2 and 6 pm for time to report for procedure  Pre-procedure prep has been given to the patient  in person  on October 19, 2022

## 2022-10-27 ENCOUNTER — OFFICE VISIT (OUTPATIENT)
Dept: OBGYN CLINIC | Facility: CLINIC | Age: 77
End: 2022-10-27
Payer: COMMERCIAL

## 2022-10-27 VITALS
HEART RATE: 97 BPM | SYSTOLIC BLOOD PRESSURE: 129 MMHG | DIASTOLIC BLOOD PRESSURE: 78 MMHG | RESPIRATION RATE: 17 BRPM | BODY MASS INDEX: 28.02 KG/M2 | WEIGHT: 189.2 LBS | HEIGHT: 69 IN

## 2022-10-27 DIAGNOSIS — M65.342 TRIGGER RING FINGER OF LEFT HAND: Primary | ICD-10-CM

## 2022-10-27 PROCEDURE — 99214 OFFICE O/P EST MOD 30 MIN: CPT | Performed by: SURGERY

## 2022-10-27 NOTE — PROGRESS NOTES
ASSESSMENT/PLAN:      65 y o  male with a left ring trigger finger (right ring trigger finger has resolved)    We discussed PIP thickening is likely due to underlying arthritis and this may not improve  A left ring trigger finger CSI was discussed at length including risks and benefits  Risks of surgery consist of but not limited to bleeding, infection, injury to surrounding structures, stiffness, pain, etc  Post operative instructions/expectations were reviewed  It was discussed with Angella Madden that he may wish to live with the trigger finger with the understanding that less then 1 percent of the time the finger will lock to the point of not being able to unlock it  Advised not to work out for 2 weeks after surgery due to infection risk and healing complication  He does not wish to proceed with surgical intervention at this time as his symptoms are currently livable  I will see him back in the office as needed if symptoms worsen or fail to improve  The patient verbalized understanding of exam findings and treatment plan  We engaged in the shared decision-making process and treatment options were discussed at length with the patient  Surgical and conservative management discussed today along with risks and benefits  Diagnoses and all orders for this visit:    Trigger ring finger of left hand      Trigger Finger Release: The anatomy and physiology of trigger finger was discussed with the patient today in the office  Edema and increased contact pressure within the flexor tendons at the A1 pulley can cause pain, crepitation, and limitation of function  Treatment options include resting MP blocking splints to decrease edema, oral anti-inflammatory medications, home or formal therapy exercises, up to 2 steroid injections or surgical release  While majority of patients do respond to conservative treatment, up to 20% may require surgical release  The patient has elected release of the trigger finger    The patient has elected to undergo a release of the A1 pulley (trigger finger)  A small incision will be made over the palmar aspect of the hand, the tendon sheath holding the flexor tendons will be released  In the postoperative period, light activities are allowed immediately, driving is allowed when narcotic medication has stopped, and the incision may get wet after 2 days  Heavy activities (lifting more than approximately 10 pounds) will be allowed after the follow up appointment in 1-2 weeks  While the pain and discomfort within the wrist typically improves rapidly, some residual discomfort may be present for up to 6 weeks  The nodule that is typically palpable in the palmar aspect of the hand will not be removed, as this would necessitate removal of a portion of the flexor tendon, however the catching, clicking, and locking should resolve  Approximate success rate is 98%  The risks and benefits of the procedure were explained to the patient, which include, but are not limited to: Bleeding, infection, recurrence, pain, scar, damage to tendons, damage to nerves, and damage to blood vessels, need for future surgery and complications related to anesthesia  If bony work is done, risks also include malunion and nonunion  These risks, along with alternative conservative treatment options, and postoperative protocols were voiced back and understood by the patient  All questions were answered to the patient's satisfaction  The patient agrees to comply with a standard postoperative protocol, and is willing to proceed  Education was provided via written and auditory forms  There were no barriers to learning  Written handouts regarding wound care, incision and scar care, and general preoperative information, as well as risks and benefits were provided to the patient  Follow Up:  Return if symptoms worsen or fail to improve        To Do Next Visit:  Re-evaluation of current issue    ____________________________________________________________________________________________________________________________________________      CHIEF COMPLAINT:  Chief Complaint   Patient presents with   • Left Ring Finger - Follow-up, Locking, Pain   • Right Ring Finger - Follow-up, Locking, Pain       SUBJECTIVE:  Luis Call is a 65y o  year old RHD male who presents to the office today for a follow up regarding a left ring trigger finger  He was last seen in the office on 22, at which time he underwent a 2nd left ring trigger finger CSI  He notes that his left ring is still locking, but is less painful  Locking does occur in the AM when making the bed  He notes his right ring finger is not locking but his PIP joint has not returned to its normal size as he is unable to wear a ring on that finger  I have personally reviewed all the relevant PMH, PSH, SH, FH, Medications and allergies       PAST MEDICAL HISTORY:  Past Medical History:   Diagnosis Date   • BPH (benign prostatic hyperplasia)    • Diabetes mellitus ([facility] [address] )    • GERD (gastroesophageal reflux disease)    • Hypertension        PAST SURGICAL HISTORY:  Past Surgical History:   Procedure Laterality Date   • COLONOSCOPY     • HERNIA REPAIR     • TONSILLECTOMY     • UPPER GASTROINTESTINAL ENDOSCOPY         FAMILY HISTORY:  Family History   Problem Relation Age of Onset   • Lupus Mother    • No Known Problems Father    • Crohn's disease Sister        SOCIAL HISTORY:  Social History     Tobacco Use   • Smoking status: Former Smoker     Packs/day: 0 25     Years: 10 00     Pack years: 2 50     Types: Cigarettes     Quit date: 3/5/1990     Years since quittin 6   • Smokeless tobacco: Never Used   Vaping Use   • Vaping Use: Never used   Substance Use Topics   • Alcohol use: Not Currently   • Drug use: Not Currently       MEDICATIONS:    Current Outpatient Medications:   •  albuterol (PROVENTIL HFA,VENTOLIN HFA) 90 mcg/act inhaler, Inhale 2 puffs every 6 (six) hours as needed for wheezing, Disp: , Rfl:   •  aspirin (ECOTRIN LOW STRENGTH) 81 mg EC tablet, 81 mg 2 (two) times a week, Disp: , Rfl:   •  budesonide-formoterol (SYMBICORT) 160-4 5 mcg/act inhaler, Inhale 2 puffs 2 (two) times a day Rinse mouth after use , Disp: , Rfl:   •  CETIRIZINE HCL PO, Take 1 tablet by mouth in the morning, Disp: , Rfl:   •  Cholecalciferol 50 MCG (2000 UT) TABS, TAKE ONE TABLET BY MOUTH EVERY DAY (FOR LOW VITAMIN D), Disp: , Rfl:   •  Coenzyme Q10 100 MG TABS, TAKE 400MG  BY MOUTH EVERY MORNING, Disp: , Rfl:   •  ferrous gluconate (FERGON) 324 mg tablet, Take 324 mg by mouth daily with breakfast, Disp: , Rfl:   •  finasteride (PROSCAR) 5 mg tablet, Take 5 mg by mouth daily, Disp: , Rfl:   •  fluticasone (FLONASE) 50 mcg/act nasal spray, INSTILL 2 SPRAYS IN NOSTRIL(S) EVERY DAY AS NEEDED FOR NASAL CONGESTION, Disp: , Rfl:   •  latanoprost (XALATAN) 0 005 % ophthalmic solution, INSTILL 1 DROP IN BOTH EYES AT BEDTIME FOR GLAUCOMA-PROTECT BOTTLE FROM LIGHT , Disp: , Rfl:   •  lisinopril (ZESTRIL) 5 mg tablet, Take 5 mg by mouth, Disp: , Rfl:   •  metFORMIN (GLUCOPHAGE) 500 mg tablet, Take 500 mg by mouth daily with breakfast, Disp: , Rfl:   •  Multiple Vitamin (MULTIVITAMINS PO), TAKE ONE CAP/TAB BY MOUTH EVERY DAY FOR SUPPLEMENTATION, Disp: , Rfl:   •  omeprazole (PriLOSEC) 20 mg delayed release capsule, 20 mg, Disp: , Rfl:   •  chlorhexidine (PERIDEX) 0 12 % solution, Apply 15 mL to the mouth or throat 4 (four) times a day (with meals and at bedtime) After meals and before bed (Patient not taking: No sig reported), Disp: 473 mL, Rfl: 0  •  ketoconazole (NIZORAL) 2 % cream, APPLY LIBERALLY TOPICALLY EVERY DAY FOR FUNGAL INFECTION (Patient not taking: No sig reported), Disp: , Rfl:     ALLERGIES:  Allergies   Allergen Reactions   • Nuts - Food Allergy Anaphylaxis   • Haloperidol Wheezing   • Oxybutynin Hives   • Penicillin G Wheezing       REVIEW OF SYSTEMS:  Review of Systems   Constitutional: Negative for chills, fever and unexpected weight change  HENT: Negative for hearing loss, nosebleeds and sore throat  Eyes: Negative for pain, redness and visual disturbance  Respiratory: Negative for cough, shortness of breath and wheezing  Cardiovascular: Negative for chest pain, palpitations and leg swelling  Gastrointestinal: Negative for abdominal pain, nausea and vomiting  Endocrine: Negative for polydipsia and polyuria  Genitourinary: Negative for difficulty urinating and hematuria  Musculoskeletal: Negative for arthralgias, joint swelling and myalgias  Skin: Negative for rash and wound  Neurological: Negative for dizziness, numbness and headaches  Psychiatric/Behavioral: Negative for decreased concentration, dysphoric mood and suicidal ideas  The patient is not nervous/anxious  VITALS:  Vitals:    10/27/22 1345   BP: 129/78   Pulse: 97   Resp: 17       LABS:  HgA1c: No results found for: HGBA1C  BMP:   Lab Results   Component Value Date    CALCIUM 8 7 02/19/2022    K 3 6 02/19/2022    CO2 27 02/19/2022     02/19/2022    BUN 24 02/19/2022    CREATININE 1 06 02/19/2022       _____________________________________________________  PHYSICAL EXAMINATION:  General: well developed and well nourished, alert, oriented times 3 and appears comfortable  Psychiatric: Normal  HEENT: Normocephalic, Atraumatic Trachea Midline, No torticollis  Pulmonary: No audible wheezing or respiratory distress   Cardiovascular: No pitting edema, 2+ radial pulse   Abdominal/GI: abdomen non tender, non distended   Skin: No masses, erythema, lacerations, fluctation, ulcerations  Neurovascular: Sensation Intact to the Median, Ulnar, Radial Nerve, Motor Intact to the Median, Ulnar, Radial Nerve and Pulses Intact  Musculoskeletal: Normal, except as noted in detailed exam and in HPI        MUSCULOSKELETAL EXAMINATION:    left ring finger:  Positive palpable nodule over the A1 pulley  Negative tenderness to palpation over A1 pulley  Positive catching  Positive clicking   Full composite fist  Brisk capillary refill        ___________________________________________________  STUDIES REVIEWED:  No new imaging to review           PROCEDURES PERFORMED:  Procedures  No Procedures performed today    _____________________________________________________      Titi Willoughby    I,:  Sabrina Mercedes am acting as a scribe while in the presence of the attending physician :       I,:  Kiana Rivera MD personally performed the services described in this documentation    as scribed in my presence :

## 2022-10-31 DIAGNOSIS — K86.89 DILATED PANCREATIC DUCT: Primary | ICD-10-CM

## 2022-11-04 ENCOUNTER — HOSPITAL ENCOUNTER (OUTPATIENT)
Dept: RADIOLOGY | Facility: HOSPITAL | Age: 77
Discharge: HOME | End: 2022-11-04

## 2022-11-04 ENCOUNTER — HOSPITAL ENCOUNTER (OUTPATIENT)
Dept: MRI IMAGING | Facility: HOSPITAL | Age: 77
End: 2022-11-04
Attending: INTERNAL MEDICINE

## 2022-11-04 ENCOUNTER — HOSPITAL ENCOUNTER (OUTPATIENT)
Dept: RADIOLOGY | Facility: HOSPITAL | Age: 77
End: 2022-11-04

## 2022-11-04 DIAGNOSIS — K86.89 DILATED PANCREATIC DUCT: ICD-10-CM

## 2022-11-07 ENCOUNTER — TELEPHONE (OUTPATIENT)
Dept: GASTROENTEROLOGY | Facility: CLINIC | Age: 77
End: 2022-11-07

## 2022-11-07 NOTE — TELEPHONE ENCOUNTER
Please call and  informed patient that  he EUS is needed to evaluate his pancreatic duct dilation   Patient will still need EUS done to evaluate his pancreatic duct dilation  The x-ray was done to check for obstruction versus constipation  X-ray was unremarkable but does not evaluate the pancreatic duct    Thank you

## 2022-11-07 NOTE — TELEPHONE ENCOUNTER
----- Message from Adriana Pierre MD sent at 11/7/2022 10:26 AM EST -----  Please inform patient that recent x-ray was unremarkable  Please find of his left lower abdominal pain has improved with fiber supplementation

## 2022-11-07 NOTE — TELEPHONE ENCOUNTER
Called and relayed results to patient     He wants to verify that the EUS is still required even though the XR didn't have any concerning findings     Please advise thank you!

## 2022-11-20 ENCOUNTER — HOSPITAL ENCOUNTER (EMERGENCY)
Facility: HOSPITAL | Age: 77
Discharge: HOME | End: 2022-11-20
Attending: EMERGENCY MEDICINE

## 2022-11-20 ENCOUNTER — APPOINTMENT (EMERGENCY)
Dept: CT IMAGING | Facility: HOSPITAL | Age: 77
End: 2022-11-20

## 2022-11-20 VITALS
HEART RATE: 100 BPM | TEMPERATURE: 97.3 F | SYSTOLIC BLOOD PRESSURE: 161 MMHG | RESPIRATION RATE: 16 BRPM | DIASTOLIC BLOOD PRESSURE: 79 MMHG | OXYGEN SATURATION: 98 %

## 2022-11-20 DIAGNOSIS — R22.0 MANDIBULAR SWELLING: Primary | ICD-10-CM

## 2022-11-20 NOTE — ED PROVIDER NOTES
History  Chief Complaint   Patient presents with   • Medical Problem     Patient states "I have this nagging gland on the left side of my neck under my ear "  Symptoms started 2 weeks ago  67yo male with a history of hypertension, type 2 diabetes, BPH, and GERD presenting for evaluation of left jaw discomfort  He reports having a "nagging gland" on his left mandibular area for the past 3 weeks  He states his swelling seems to be improving but has not resolved  He notices that the swelling seems to be worse after taking a hot shower  He also has worsening pain with lozenges  Patient was seen by his dentist and was told his symptoms were unrelated to his teeth  He denies any fevers, chills, ear pain, URI symptoms, weight loss  No pain with mastication  History provided by:  Patient   used: No    Medical Problem  Location:  Left jaw  Quality:  Pain, swelling  Severity:  Mild  Onset quality:  Gradual  Duration:  3 weeks  Timing:  Constant  Progression:  Improving  Chronicity:  New  Relieved by:  Nothing  Worsened by:  Lozenges  Associated symptoms: no congestion, no cough, no fatigue, no fever, no nausea, no rash, no shortness of breath, no sore throat and no vomiting        Prior to Admission Medications   Prescriptions Last Dose Informant Patient Reported? Taking?    CETIRIZINE HCL PO   Yes No   Sig: Take 1 tablet by mouth in the morning   Cholecalciferol 50 MCG (2000 UT) TABS   Yes No   Sig: TAKE ONE TABLET BY MOUTH EVERY DAY (FOR LOW VITAMIN D)   Coenzyme Q10 100 MG TABS   Yes No   Sig: TAKE 400MG  BY MOUTH EVERY MORNING   Multiple Vitamin (MULTIVITAMINS PO)   Yes No   Sig: TAKE ONE CAP/TAB BY MOUTH EVERY DAY FOR SUPPLEMENTATION   albuterol (PROVENTIL HFA,VENTOLIN HFA) 90 mcg/act inhaler   Yes No   Sig: Inhale 2 puffs every 6 (six) hours as needed for wheezing   aspirin (ECOTRIN LOW STRENGTH) 81 mg EC tablet   Yes No   Si mg 2 (two) times a week   budesonide-formoterol (SYMBICORT) 160-4 5 mcg/act inhaler   Yes No   Sig: Inhale 2 puffs 2 (two) times a day Rinse mouth after use  chlorhexidine (PERIDEX) 0 12 % solution   No No   Sig: Apply 15 mL to the mouth or throat 4 (four) times a day (with meals and at bedtime) After meals and before bed   Patient not taking: No sig reported   ferrous gluconate (FERGON) 324 mg tablet   Yes No   Sig: Take 324 mg by mouth daily with breakfast   finasteride (PROSCAR) 5 mg tablet   Yes No   Sig: Take 5 mg by mouth daily   fluticasone (FLONASE) 50 mcg/act nasal spray   Yes No   Sig: INSTILL 2 SPRAYS IN NOSTRIL(S) EVERY DAY AS NEEDED FOR NASAL CONGESTION   ketoconazole (NIZORAL) 2 % cream   Yes No   Sig: APPLY LIBERALLY TOPICALLY EVERY DAY FOR FUNGAL INFECTION   Patient not taking: No sig reported   latanoprost (XALATAN) 0 005 % ophthalmic solution   Yes No   Sig: INSTILL 1 DROP IN BOTH EYES AT BEDTIME FOR GLAUCOMA-PROTECT BOTTLE FROM LIGHT  lisinopril (ZESTRIL) 5 mg tablet   Yes No   Sig: Take 5 mg by mouth   metFORMIN (GLUCOPHAGE) 500 mg tablet   Yes No   Sig: Take 500 mg by mouth daily with breakfast   omeprazole (PriLOSEC) 20 mg delayed release capsule   Yes No   Si mg      Facility-Administered Medications: None       Past Medical History:   Diagnosis Date   • BPH (benign prostatic hyperplasia)    • Diabetes mellitus (HCC)    • GERD (gastroesophageal reflux disease)    • Hypertension        Past Surgical History:   Procedure Laterality Date   • COLONOSCOPY     • HERNIA REPAIR     • TONSILLECTOMY     • UPPER GASTROINTESTINAL ENDOSCOPY         Family History   Problem Relation Age of Onset   • Lupus Mother    • No Known Problems Father    • Crohn's disease Sister      I have reviewed and agree with the history as documented      E-Cigarette/Vaping   • E-Cigarette Use Never User      E-Cigarette/Vaping Substances   • Nicotine No      Social History     Tobacco Use   • Smoking status: Former     Packs/day: 0 25     Years: 10 00     Pack years: 2 50 Types: Cigarettes     Quit date: 3/5/1990     Years since quittin 7   • Smokeless tobacco: Never   Vaping Use   • Vaping Use: Never used   Substance Use Topics   • Alcohol use: Not Currently   • Drug use: Not Currently       Review of Systems   Constitutional: Negative for chills, fatigue, fever and unexpected weight change  HENT: Positive for facial swelling  Negative for congestion, dental problem, drooling, sore throat and trouble swallowing  Eyes: Negative for discharge and redness  Respiratory: Negative for cough and shortness of breath  Gastrointestinal: Negative for nausea and vomiting  Musculoskeletal: Negative for neck pain and neck stiffness  Skin: Negative for color change and rash  Psychiatric/Behavioral: Negative for confusion  The patient is not nervous/anxious  All other systems reviewed and are negative  Physical Exam  Physical Exam  Vitals and nursing note reviewed  Constitutional:       General: He is not in acute distress  Appearance: Normal appearance  He is not toxic-appearing  HENT:      Head: Normocephalic and atraumatic  Jaw: No trismus or pain on movement  Comments: No salivary gland stone or purulence visualized  No evidence of dental infection or abscess  No trismus  Normal phonation  Handling oral secretions without difficulty  Right Ear: Tympanic membrane, ear canal and external ear normal       Left Ear: Tympanic membrane, ear canal and external ear normal       Mouth/Throat:      Mouth: Mucous membranes are moist       Pharynx: Oropharynx is clear  No oropharyngeal exudate or posterior oropharyngeal erythema  Eyes:      General: No scleral icterus  Right eye: No discharge  Left eye: No discharge  Conjunctiva/sclera: Conjunctivae normal    Cardiovascular:      Rate and Rhythm: Normal rate  Pulmonary:      Effort: Pulmonary effort is normal  No respiratory distress  Breath sounds: No stridor  Musculoskeletal:         General: No deformity  Normal range of motion  Cervical back: Normal range of motion and neck supple  No rigidity  Skin:     General: Skin is warm and dry  Neurological:      General: No focal deficit present  Mental Status: He is alert  Mental status is at baseline  Psychiatric:         Mood and Affect: Mood normal          Behavior: Behavior normal          Vital Signs  ED Triage Vitals [11/20/22 1538]   Temperature Pulse Respirations Blood Pressure SpO2   (!) 97 3 °F (36 3 °C) 100 16 161/79 98 %      Temp Source Heart Rate Source Patient Position - Orthostatic VS BP Location FiO2 (%)   Oral Monitor Sitting Left arm --      Pain Score       --           Vitals:    11/20/22 1538   BP: 161/79   Pulse: 100   Patient Position - Orthostatic VS: Sitting         Visual Acuity      ED Medications  Medications - No data to display    Diagnostic Studies  Results Reviewed     None                 CT facial bones without contrast   Final Result by Hunter Clay MD (11/20 1725)      No significant abnormality identified  Interval resolution of the abscess near the right side of the maxilla since 2/19/2022  Incidental finding of degenerative arthritis of the cervical spine         Workstation performed: [id number]                    Procedures  Procedures         ED Course                       SBIRT 20yo+    Flowsheet Row Most Recent Value   SBIRT (25 yo +)    In order to provide better care to our patients, we are screening all of our patients for alcohol and drug use  Would it be okay to ask you these screening questions? Yes Filed at: 11/20/2022 1606   Initial Alcohol Screen: US AUDIT-C     1  How often do you have a drink containing alcohol? 0 Filed at: 11/20/2022 1606   2  How many drinks containing alcohol do you have on a typical day you are drinking? 0 Filed at: 11/20/2022 1606   3a  Male UNDER 65:  How often do you have five or more drinks on one occasion? 0 Filed at: 11/20/2022 1606   3b  FEMALE Any Age, or MALE 65+: How often do you have 4 or more drinks on one occassion? 0 Filed at: 11/20/2022 1606   Audit-C Score 0 Filed at: 11/20/2022 1606   RENZO: How many times in the past year have you    Used an illegal drug or used a prescription medication for non-medical reasons? Never Filed at: 11/20/2022 1606                    MDM  Number of Diagnoses or Management Options  Mandibular swelling: new and requires workup  Diagnosis management comments: 69yo male presenting for left jaw pain and swelling x 3 weeks  Vitals are stable  On exam, he has mild swelling and tenderness near the mandibular angle  No overlying skin changes  No evidence of dental infection  No salivary gland stone or purulence seen  Differential diagnosis includes but is not limited to: sialoadenitis, sialolithiasis, parotitis, lymphadenopathy, TMJ    CT facial bones obtained  No acute findings on imaging  Unclear etiology of symptoms  Advised warm compresses and f/u with ENT  ED return precautions discussed  Patient expressed understanding and is agreeable to plan  Patient discharged in stable condition           Amount and/or Complexity of Data Reviewed  Tests in the radiology section of CPT®: ordered and reviewed  Independent visualization of images, tracings, or specimens: yes    Risk of Complications, Morbidity, and/or Mortality  Presenting problems: moderate  Diagnostic procedures: low  Management options: low    Patient Progress  Patient progress: stable      Disposition  Final diagnoses:   Mandibular swelling     Time reflects when diagnosis was documented in both MDM as applicable and the Disposition within this note     Time User Action Codes Description Comment    11/20/2022  5:48 PM [address] [place], [place] [address]y, [place] [R22 [id number]] Mandibular swelling       ED Disposition     ED Disposition   Discharge    Condition   Stable    Date/Time   Sun Nov 20, 2022  5:48 PM    Eduard Majano discharge to home/self care  Follow-up Information     Follow up With Specialties Details Why Contact Info Additional Information    [facility] Schedule an appointment as soon as possible for a visit   [address] [facility], [address] [facility] [address] , [place]    [address] [address], [address] Emergency Department Emergency Medicine  If symptoms worsen [facility] Emergency Department, [address] [place], [place], [place] MD Chris Otol[facility]   [address] [place] [id number]  [phone]             Discharge Medication List as of 11/20/2022  5:50 PM      CONTINUE these medications which have NOT CHANGED    Details   albuterol (PROVENTIL HFA,VENTOLIN HFA) 90 mcg/act inhaler Inhale 2 puffs every 6 (six) hours as needed for wheezing, Historical Med      aspirin (ECOTRIN LOW STRENGTH) 81 mg EC tablet 81 mg 2 (two) times a week, Starting Thu 1/20/2022, Historical Med      budesonide-formoterol (SYMBICORT) 160-4 5 mcg/act inhaler Inhale 2 puffs 2 (two) times a day Rinse mouth after use , Historical Med      CETIRIZINE HCL PO Take 1 tablet by mouth in the morning, Historical Med      chlorhexidine (PERIDEX) 0 12 % solution Apply 15 mL to the mouth or throat 4 (four) times a day (with meals and at bedtime) After meals and before bed, Starting Sat 2/19/2022, Print      Cholecalciferol 50 MCG (2000 UT) TABS TAKE ONE TABLET BY MOUTH EVERY DAY (FOR LOW VITAMIN D), Historical Med      Coenzyme Q10 100 MG TABS TAKE 400MG  BY MOUTH EVERY MORNING, Historical Med      ferrous gluconate (FERGON) 324 mg tablet Take 324 mg by mouth daily with breakfast, Historical Med      finasteride (PROSCAR) 5 mg tablet Take 5 mg by mouth daily, Starting Thu 1/6/2022, Historical Med      fluticasone (FLONASE) 50 mcg/act nasal spray INSTILL 2 SPRAYS IN NOSTRIL(S) EVERY DAY AS NEEDED FOR NASAL CONGESTION, Historical Med      ketoconazole (NIZORAL) 2 % cream APPLY LIBERALLY TOPICALLY EVERY DAY FOR FUNGAL INFECTION, Historical Med      latanoprost (XALATAN) 0 005 % ophthalmic solution INSTILL 1 DROP IN BOTH EYES AT BEDTIME FOR GLAUCOMA-PROTECT BOTTLE FROM LIGHT , Historical Med      lisinopril (ZESTRIL) 5 mg tablet Take 5 mg by mouth, Starting u 1/6/2022, Historical Med      metFORMIN (GLUCOPHAGE) 500 mg tablet Take 500 mg by mouth daily with breakfast, Starting Thu 1/6/2022, Historical Med      Multiple Vitamin (MULTIVITAMINS PO) TAKE ONE CAP/TAB BY MOUTH EVERY DAY FOR SUPPLEMENTATION, Historical Med      omeprazole (PriLOSEC) 20 mg delayed release capsule 20 mg, Starting Mon 4/11/2022, Historical Med                 PDMP Review     None          ED Provider  Electronically Signed by           Sylvia Hung PA-C  11/21/22 2158

## 2022-11-20 NOTE — DISCHARGE INSTRUCTIONS
Massage the area and apply warm compresses  Drink plenty of fluids  Please call tomorrow to schedule a follow-up appointment with ENT

## 2022-12-13 ENCOUNTER — NURSE TRIAGE (OUTPATIENT)
Dept: OTHER | Facility: OTHER | Age: 77
End: 2022-12-13

## 2022-12-13 NOTE — TELEPHONE ENCOUNTER
Regarding: prep before procedure  ----- Message from [facility] sent at 12/13/2022  9:11 AM EST -----  "I would like to discuss prep before procedure on 12/19/2022 "

## 2022-12-13 NOTE — TELEPHONE ENCOUNTER
Reason for Disposition  • Nursing judgment    Answer Assessment - Initial Assessment Questions  1  REASON FOR CALL or QUESTION: "What is your reason for calling today?" or "How can I best help you?" or "What question do you have that I can help answer?"      Endo U/S is scheduled 12/19-is there any prep for that? Will I be sedated?     Protocols used: INFORMATION ONLY CALL - NO TRIAGE-ADULT-OH

## 2022-12-13 NOTE — TELEPHONE ENCOUNTER
Spoke with patient, answered all questions in regards to EUS sedation and NPO after midnight prior to procedure  No further questions

## 2022-12-19 ENCOUNTER — ANESTHESIA (OUTPATIENT)
Dept: GASTROENTEROLOGY | Facility: HOSPITAL | Age: 77
End: 2022-12-19

## 2022-12-19 ENCOUNTER — HOSPITAL ENCOUNTER (OUTPATIENT)
Dept: GASTROENTEROLOGY | Facility: HOSPITAL | Age: 77
Setting detail: OUTPATIENT SURGERY
Discharge: HOME | End: 2022-12-19
Attending: INTERNAL MEDICINE

## 2022-12-19 ENCOUNTER — ANESTHESIA EVENT (OUTPATIENT)
Dept: GASTROENTEROLOGY | Facility: HOSPITAL | Age: 77
End: 2022-12-19

## 2022-12-19 VITALS
TEMPERATURE: 98.4 F | HEART RATE: 76 BPM | DIASTOLIC BLOOD PRESSURE: 60 MMHG | OXYGEN SATURATION: 98 % | RESPIRATION RATE: 16 BRPM | SYSTOLIC BLOOD PRESSURE: 118 MMHG

## 2022-12-19 DIAGNOSIS — K86.89 DILATED PANCREATIC DUCT: ICD-10-CM

## 2022-12-19 PROBLEM — K05.312: Status: ACTIVE | Noted: 2022-12-19

## 2022-12-19 PROBLEM — J44.9 ASTHMA-CHRONIC OBSTRUCTIVE PULMONARY DISEASE OVERLAP SYNDROME (HCC): Status: ACTIVE | Noted: 2022-12-19

## 2022-12-19 PROBLEM — N40.0 BENIGN PROSTATIC HYPERPLASIA WITHOUT URINARY OBSTRUCTION: Status: ACTIVE | Noted: 2022-12-19

## 2022-12-19 PROBLEM — K05.10 CHRONIC GINGIVITIS, PLAQUE INDUCED: Status: ACTIVE | Noted: 2022-12-19

## 2022-12-19 PROBLEM — J44.89 ASTHMA-CHRONIC OBSTRUCTIVE PULMONARY DISEASE OVERLAP SYNDROME: Status: ACTIVE | Noted: 2022-12-19

## 2022-12-19 PROBLEM — D64.9 ANEMIA: Status: ACTIVE | Noted: 2022-12-19

## 2022-12-19 LAB — GLUCOSE SERPL-MCNC: 94 MG/DL (ref 65–140)

## 2022-12-19 RX ORDER — PROPOFOL 10 MG/ML
INJECTION, EMULSION INTRAVENOUS AS NEEDED
Status: DISCONTINUED | OUTPATIENT
Start: 2022-12-19 | End: 2022-12-19

## 2022-12-19 RX ORDER — SODIUM CHLORIDE, SODIUM LACTATE, POTASSIUM CHLORIDE, CALCIUM CHLORIDE 600; 310; 30; 20 MG/100ML; MG/100ML; MG/100ML; MG/100ML
INJECTION, SOLUTION INTRAVENOUS CONTINUOUS PRN
Status: DISCONTINUED | OUTPATIENT
Start: 2022-12-19 | End: 2022-12-19

## 2022-12-19 RX ORDER — PROPOFOL 10 MG/ML
INJECTION, EMULSION INTRAVENOUS CONTINUOUS PRN
Status: DISCONTINUED | OUTPATIENT
Start: 2022-12-19 | End: 2022-12-19

## 2022-12-19 RX ORDER — LIDOCAINE HYDROCHLORIDE 20 MG/ML
INJECTION, SOLUTION EPIDURAL; INFILTRATION; INTRACAUDAL; PERINEURAL AS NEEDED
Status: DISCONTINUED | OUTPATIENT
Start: 2022-12-19 | End: 2022-12-19

## 2022-12-19 RX ADMIN — PROPOFOL 50 MG: 10 INJECTION, EMULSION INTRAVENOUS at 09:59

## 2022-12-19 RX ADMIN — PROPOFOL 100 MG: 10 INJECTION, EMULSION INTRAVENOUS at 09:48

## 2022-12-19 RX ADMIN — LIDOCAINE HYDROCHLORIDE 100 MG: 20 INJECTION, SOLUTION EPIDURAL; INFILTRATION; INTRACAUDAL at 09:48

## 2022-12-19 RX ADMIN — PROPOFOL 100 MCG/KG/MIN: 10 INJECTION, EMULSION INTRAVENOUS at 09:49

## 2022-12-19 RX ADMIN — SODIUM CHLORIDE, SODIUM LACTATE, POTASSIUM CHLORIDE, AND CALCIUM CHLORIDE: .6; .31; .03; .02 INJECTION, SOLUTION INTRAVENOUS at 08:46

## 2022-12-19 NOTE — ANESTHESIA POSTPROCEDURE EVALUATION
Post-Op Assessment Note    CV Status:  Stable  Pain Score: 0    Pain management: adequate     Mental Status:  Alert and awake   Hydration Status:  Euvolemic   PONV Controlled:  Controlled   Airway Patency:  Patent      Post Op Vitals Reviewed: Yes      Staff: Anesthesiologist, CRNA         No notable events documented      BP   106/60   Temp     Pulse  71   Resp   16   SpO2   99%

## 2022-12-19 NOTE — H&P
History and Physical -  [facility]  Liban Truong 65 y o  male MRN: [id number]    HPI: Liban Truong is a 65y o  year old male who presents with PD stricture      Review of Systems    Historical Information   Past Medical History:   Diagnosis Date   • BPH (benign prostatic hyperplasia)    • Diabetes mellitus ([address] )    • GERD (gastroesophageal reflux disease)    • Hypertension      Past Surgical History:   Procedure Laterality Date   • COLONOSCOPY     • HERNIA REPAIR     • TONSILLECTOMY     • UPPER GASTROINTESTINAL ENDOSCOPY       Social History   Social History     Substance and Sexual Activity   Alcohol Use Not Currently     Social History     Substance and Sexual Activity   Drug Use Not Currently     Social History     Tobacco Use   Smoking Status Former   • Packs/day: 0 25   • Years: 10 00   • Pack years: 2 50   • Types: Cigarettes   • Quit date: 3/5/1990   • Years since quittin 8   Smokeless Tobacco Never     Family History   Problem Relation Age of Onset   • Lupus Mother    • No Known Problems Father    • Crohn's disease Sister        Meds/Allergies     (Not in a hospital admission)      Allergies   Allergen Reactions   • Nuts - Food Allergy Anaphylaxis   • Haloperidol Wheezing   • Oxybutynin Hives   • Penicillin G Wheezing       Objective     /76   Pulse 82   Temp (!) 97 3 °F (36 3 °C) (Temporal)   Resp 20   SpO2 98%       PHYSICAL EXAM    Gen: NAD  CV: RRR  CHEST: Clear  ABD: soft, NT/ND  EXT: no edema  Neuro: AAO      ASSESSMENT/PLAN:  This is a 65y o  year old male here for v PD stricture    PLAN:   Procedure: eus

## 2022-12-19 NOTE — ANESTHESIA PREPROCEDURE EVALUATION
Procedure:  ENDOSCOPIC ULTRASOUND (UPPER)    Relevant Problems   GI/HEPATIC   (+) Dilated pancreatic duct      /RENAL   (+) Benign prostatic hyperplasia without urinary obstruction      HEMATOLOGY   (+) Anemia      PULMONARY   (+) Asthma-chronic obstructive pulmonary disease overlap syndrome (HCC)      Digestive   (+) Dental infection      Other   (+) LLQ pain        Physical Exam    Airway    Mallampati score: II  TM Distance: >3 FB  Neck ROM: full     Dental       Cardiovascular      Pulmonary      Other Findings        Anesthesia Plan  ASA Score- 2     Anesthesia Type- IV sedation with anesthesia with ASA Monitors  Additional Monitors:   Airway Plan:           Plan Factors-Exercise tolerance (METS): >4 METS  Chart reviewed  EKG reviewed  Imaging results reviewed  Existing labs reviewed  Patient summary reviewed  Induction- intravenous  Postoperative Plan-     Informed Consent- Anesthetic plan and risks discussed with patient  I personally reviewed this patient with the CRNA  Discussed and agreed on the Anesthesia Plan with the CRNA  Micheal Robertson

## 2022-12-23 DIAGNOSIS — K86.89 DILATED PANCREATIC DUCT: Primary | ICD-10-CM

## 2022-12-27 PROBLEM — K11.8 PAROTID MASS: Status: ACTIVE | Noted: 2022-12-27

## 2023-03-09 ENCOUNTER — HOSPITAL ENCOUNTER (EMERGENCY)
Facility: HOSPITAL | Age: 78
Discharge: HOME | End: 2023-03-09
Attending: EMERGENCY MEDICINE

## 2023-03-09 VITALS
RESPIRATION RATE: 18 BRPM | HEART RATE: 98 BPM | DIASTOLIC BLOOD PRESSURE: 70 MMHG | OXYGEN SATURATION: 98 % | TEMPERATURE: 97.8 F | SYSTOLIC BLOOD PRESSURE: 146 MMHG

## 2023-03-09 DIAGNOSIS — R07.9 CHEST PAIN: Primary | ICD-10-CM

## 2023-03-09 LAB
ALBUMIN SERPL BCP-MCNC: 4.5 G/DL (ref 3.5–5)
ALP SERPL-CCNC: 56 U/L (ref 34–104)
ALT SERPL W P-5'-P-CCNC: 22 U/L (ref 7–52)
ANION GAP SERPL CALCULATED.3IONS-SCNC: 8 MMOL/L (ref 4–13)
ANISOCYTOSIS BLD QL SMEAR: PRESENT
AST SERPL W P-5'-P-CCNC: 24 U/L (ref 13–39)
ATRIAL RATE: 95 BPM
BASOPHILS # BLD MANUAL: 0 THOUSAND/UL (ref 0–0.1)
BASOPHILS NFR MAR MANUAL: 0 % (ref 0–1)
BILIRUB SERPL-MCNC: 0.45 MG/DL (ref 0.2–1)
BUN SERPL-MCNC: 30 MG/DL (ref 5–25)
CALCIUM SERPL-MCNC: 9.7 MG/DL (ref 8.4–10.2)
CARDIAC TROPONIN I PNL SERPL HS: <2 NG/L
CHLORIDE SERPL-SCNC: 105 MMOL/L (ref 96–108)
CO2 SERPL-SCNC: 24 MMOL/L (ref 21–32)
CREAT SERPL-MCNC: 1.19 MG/DL (ref 0.6–1.3)
EOSINOPHIL # BLD MANUAL: 0.32 THOUSAND/UL (ref 0–0.4)
EOSINOPHIL NFR BLD MANUAL: 5 % (ref 0–6)
ERYTHROCYTE [DISTWIDTH] IN BLOOD BY AUTOMATED COUNT: 14.5 % (ref 11.6–15.1)
GFR SERPL CREATININE-BSD FRML MDRD: 58 ML/MIN/1.73SQ M
GLUCOSE SERPL-MCNC: 117 MG/DL (ref 65–140)
HCT VFR BLD AUTO: 41.9 % (ref 36.5–49.3)
HGB BLD-MCNC: 13.5 G/DL (ref 12–17)
LYMPHOCYTES # BLD AUTO: 2.65 THOUSAND/UL (ref 0.6–4.47)
LYMPHOCYTES # BLD AUTO: 42 % (ref 14–44)
MCH RBC QN AUTO: 23.5 PG (ref 26.8–34.3)
MCHC RBC AUTO-ENTMCNC: 32.2 G/DL (ref 31.4–37.4)
MCV RBC AUTO: 73 FL (ref 82–98)
MONOCYTES # BLD AUTO: 0.13 THOUSAND/UL (ref 0–1.22)
MONOCYTES NFR BLD: 2 % (ref 4–12)
NEUTROPHILS # BLD MANUAL: 3.16 THOUSAND/UL (ref 1.85–7.62)
NEUTS SEG NFR BLD AUTO: 50 % (ref 43–75)
P AXIS: 67 DEGREES
PLATELET # BLD AUTO: 231 THOUSANDS/UL (ref 149–390)
PLATELET BLD QL SMEAR: ADEQUATE
PMV BLD AUTO: 10.3 FL (ref 8.9–12.7)
POTASSIUM SERPL-SCNC: 3.9 MMOL/L (ref 3.5–5.3)
PR INTERVAL: 150 MS
PROT SERPL-MCNC: 8 G/DL (ref 6.4–8.4)
QRS AXIS: -75 DEGREES
QRSD INTERVAL: 94 MS
QT INTERVAL: 364 MS
QTC INTERVAL: 457 MS
RBC # BLD AUTO: 5.74 MILLION/UL (ref 3.88–5.62)
SODIUM SERPL-SCNC: 137 MMOL/L (ref 135–147)
T WAVE AXIS: 47 DEGREES
VARIANT LYMPHS # BLD AUTO: 1 %
VENTRICULAR RATE: 95 BPM
WBC # BLD AUTO: 6.31 THOUSAND/UL (ref 4.31–10.16)

## 2023-03-10 LAB
ATRIAL RATE: 85 BPM
P AXIS: 77 DEGREES
PR INTERVAL: 156 MS
QRS AXIS: -66 DEGREES
QRSD INTERVAL: 98 MS
QT INTERVAL: 362 MS
QTC INTERVAL: 430 MS
T WAVE AXIS: 54 DEGREES
VENTRICULAR RATE: 85 BPM

## 2023-03-19 NOTE — ED PROVIDER NOTES
History  Chief Complaint   Patient presents with   • Chest Pain     Pt arrives c/o upper back pain that radiates to chest area  Pt states its feels like indigestion being that it started after eating  Denies cardiac hx  Hx of GERD  65 y o   male  Patient presents with:  Chest Pain: Pt arrives c/o upper back pain that radiates to chest area  Pt states its feels like indigestion being that it started after eating  Denies cardiac hx  Hx of GERD  Past Medical History:  No date: BPH (benign prostatic hyperplasia)  No date: Diabetes mellitus (HCC)  No date: GERD (gastroesophageal reflux disease)  No date: Hypertension Active Ambulatory Problems    Dental infection         Date Noted: 04/12/2022      Dilated pancreatic duct         Date Noted: 10/18/2022      LLQ pain         Date Noted: 10/18/2022      Asthma-chronic obstructive pulmonary disease overlap syndrome ([facility] [address] )         Date Noted: 12/19/2022      Benign prostatic hyperplasia without urinary obstruction         Date Noted: 12/19/2022      Chronic gingivitis, plaque induced         Date Noted: 12/19/2022      Chronic periodontitis, localized, moderate         Date Noted: 12/19/2022      Cocaine dependence, in remission [facility])         Date Noted: 01/01/1980      Anemia         Date Noted: 12/19/2022      Parotid mass         Date Noted: 12/27/2022    Resolved Ambulatory Problems  No Resolved Ambulatory Problems  Past Medical History:  No date: BPH (benign prostatic hyperplasia)  No date: Diabetes mellitus ([facility] )  No date: GERD (gastroesophageal reflux disease)  No date: Hypertension     The patient relates that he has had similar symptoms multiple times in the past and came in today with concern for acute coronary syndrome        History provided by:  Patient   used: No    Chest Pain  Pain location:  Substernal area  Pain quality: aching    Pain radiates to:  Does not radiate  Pain severity:  Mild  Timing:  Constant  Progression: Unchanged  Chronicity:  New  Worsened by:  Nothing tried  Ineffective treatments:  None tried  Associated symptoms: no AICD problem, no back pain, no claudication, no fatigue, no heartburn and no shortness of breath        Prior to Admission Medications   Prescriptions Last Dose Informant Patient Reported? Taking? CETIRIZINE HCL PO   Yes No   Sig: Take 1 tablet by mouth in the morning   Cholecalciferol 50 MCG (2000 UT) TABS   Yes No   Sig: TAKE ONE TABLET BY MOUTH EVERY DAY (FOR LOW VITAMIN D)   Coenzyme Q10 100 MG TABS   Yes No   Sig: TAKE 400MG  BY MOUTH EVERY MORNING   Multiple Vitamin (MULTIVITAMINS PO)   Yes No   Sig: TAKE ONE CAP/TAB BY MOUTH EVERY DAY FOR SUPPLEMENTATION   albuterol (PROVENTIL HFA,VENTOLIN HFA) 90 mcg/act inhaler   Yes No   Sig: Inhale 2 puffs every 6 (six) hours as needed for wheezing   aspirin (ECOTRIN LOW STRENGTH) 81 mg EC tablet   Yes No   Si mg 2 (two) times a week   budesonide-formoterol (SYMBICORT) 160-4 5 mcg/act inhaler   Yes No   Sig: Inhale 2 puffs 2 (two) times a day Rinse mouth after use  chlorhexidine (PERIDEX) 0 12 % solution   No No   Sig: Apply 15 mL to the mouth or throat 4 (four) times a day (with meals and at bedtime) After meals and before bed   ferrous gluconate (FERGON) 324 mg tablet   Yes No   Sig: Take 324 mg by mouth daily with breakfast   finasteride (PROSCAR) 5 mg tablet   Yes No   Sig: Take 5 mg by mouth daily   fluticasone (FLONASE) 50 mcg/act nasal spray   Yes No   Sig: INSTILL 2 SPRAYS IN NOSTRIL(S) EVERY DAY AS NEEDED FOR NASAL CONGESTION   ketoconazole (NIZORAL) 2 % cream   Yes No   latanoprost (XALATAN) 0 005 % ophthalmic solution   Yes No   Sig: INSTILL 1 DROP IN BOTH EYES AT BEDTIME FOR GLAUCOMA-PROTECT BOTTLE FROM LIGHT     lisinopril (ZESTRIL) 5 mg tablet   Yes No   Sig: Take 5 mg by mouth   metFORMIN (GLUCOPHAGE) 500 mg tablet   Yes No   Sig: Take 500 mg by mouth daily with breakfast   omeprazole (PriLOSEC) 20 mg delayed release capsule   Yes No   Si mg      Facility-Administered Medications: None       Past Medical History:   Diagnosis Date   • BPH (benign prostatic hyperplasia)    • Diabetes mellitus ([facility] [address] )    • GERD (gastroesophageal reflux disease)    • Hypertension        Past Surgical History:   Procedure Laterality Date   • COLONOSCOPY     • HERNIA REPAIR     • TONSILLECTOMY     • UPPER GASTROINTESTINAL ENDOSCOPY         Family History   Problem Relation Age of Onset   • Lupus Mother    • No Known Problems Father    • Crohn's disease Sister      I have reviewed and agree with the history as documented  E-Cigarette/Vaping   • E-Cigarette Use Never User      E-Cigarette/Vaping Substances   • Nicotine No      Social History     Tobacco Use   • Smoking status: Former     Packs/day: 0 25     Years: 10 00     Pack years: 2 50     Types: Cigarettes     Quit date: 3/5/1990     Years since quittin 0   • Smokeless tobacco: Never   Vaping Use   • Vaping Use: Never used   Substance Use Topics   • Alcohol use: Not Currently   • Drug use: Not Currently       Review of Systems   Constitutional: Negative for fatigue  Respiratory: Negative for shortness of breath  Cardiovascular: Positive for chest pain  Negative for claudication  Gastrointestinal: Negative for heartburn  Musculoskeletal: Negative for back pain  All other systems reviewed and are negative  Physical Exam  Physical Exam  Vitals and nursing note reviewed  Constitutional:       General: He is not in acute distress  Appearance: He is well-developed  He is not diaphoretic  HENT:      Head: Normocephalic and atraumatic  Right Ear: External ear normal       Left Ear: External ear normal    Eyes:      General: No scleral icterus  Right eye: No discharge  Left eye: No discharge  Conjunctiva/sclera: Conjunctivae normal    Neck:      Thyroid: No thyromegaly  Vascular: No JVD  Trachea: No tracheal deviation     Cardiovascular:      Rate and Rhythm: Normal rate and regular rhythm  Pulmonary:      Effort: Pulmonary effort is normal  No respiratory distress  Breath sounds: Normal breath sounds  No stridor  No wheezing or rales  Abdominal:      General: Bowel sounds are normal  There is no distension  Palpations: Abdomen is soft  Tenderness: There is no abdominal tenderness  Musculoskeletal:         General: No tenderness or deformity  Normal range of motion  Cervical back: Normal range of motion and neck supple  Skin:     General: Skin is warm and dry  Neurological:      Mental Status: He is alert and oriented to person, place, and time  Cranial Nerves: No cranial nerve deficit  Coordination: Coordination normal    Psychiatric:         Behavior: Behavior normal          Vital Signs  ED Triage Vitals   Temperature Pulse Respirations Blood Pressure SpO2   03/09/23 2002 03/09/23 1959 03/09/23 1959 03/09/23 1959 03/09/23 1959   97 8 °F (36 6 °C) 98 18 146/70 98 %      Temp Source Heart Rate Source Patient Position - Orthostatic VS BP Location FiO2 (%)   03/09/23 2002 03/09/23 1959 03/09/23 1959 03/09/23 1959 --   Oral Monitor Sitting Left arm       Pain Score       --                  Vitals:    03/09/23 1959   BP: 146/70   Pulse: 98   Patient Position - Orthostatic VS: Sitting         Visual Acuity      ED Medications  Medications - No data to display    Diagnostic Studies  Results Reviewed     Procedure Component Value Units Date/Time    CBC and differential [[id number]]  (Abnormal) Collected: 03/09/23 2037    Lab Status: Final result Specimen: Blood from Arm, Right Updated: 03/09/23 2129     WBC 6 31 Thousand/uL      RBC 5 74 Million/uL      Hemoglobin 13 5 g/dL      Hematocrit 41 9 %      MCV 73 fL      MCH 23 5 pg      MCHC 32 2 g/dL      RDW 14 5 %      MPV 10 3 fL      Platelets 795 Thousands/uL     Narrative: This is an appended report  These results have been appended to a previously verified report  Manual Differential(PHLEBS Do Not Order) [[id number]]  (Abnormal) Collected: 03/09/23 2037    Lab Status: Final result Specimen: Blood from Arm, Right Updated: 03/09/23 2129     Segmented % 50 %      Lymphocytes % 42 %      Monocytes % 2 %      Eosinophils, % 5 %      Basophils % 0 %      Atypical Lymphocytes % 1 %      Absolute Neutrophils 3 16 Thousand/uL      Lymphocytes Absolute 2 65 Thousand/uL      Monocytes Absolute 0 13 Thousand/uL      Eosinophils Absolute 0 32 Thousand/uL      Basophils Absolute 0 00 Thousand/uL      Total Counted --     Anisocytosis Present     Platelet Estimate Adequate    HS Troponin 0hr (reflex protocol) [[id number]]  (Normal) Collected: 03/09/23 2037    Lab Status: Final result Specimen: Blood from Arm, Right Updated: 03/09/23 2122     hs TnI 0hr <2 ng/L     Comprehensive metabolic panel [[id number]]  (Abnormal) Collected: 03/09/23 2037    Lab Status: Final result Specimen: Blood from Arm, Right Updated: 03/09/23 2120     Sodium 137 mmol/L      Potassium 3 9 mmol/L      Chloride 105 mmol/L      CO2 24 mmol/L      ANION GAP 8 mmol/L      BUN 30 mg/dL      Creatinine 1 19 mg/dL      Glucose 117 mg/dL      Calcium 9 7 mg/dL      AST 24 U/L      ALT 22 U/L      Alkaline Phosphatase 56 U/L      Total Protein 8 0 g/dL      Albumin 4 5 g/dL      Total Bilirubin 0 45 mg/dL      eGFR 58 ml/min/1 73sq m     Narrative:      Sung guidelines for Chronic Kidney Disease (CKD):   •  Stage 1 with normal or high GFR (GFR > 90 mL/min/1 73 square meters)  •  Stage 2 Mild CKD (GFR = 60-89 mL/min/1 73 square meters)  •  Stage 3A Moderate CKD (GFR = 45-59 mL/min/1 73 square meters)  •  Stage 3B Moderate CKD (GFR = 30-44 mL/min/1 73 square meters)  •  Stage 4 Severe CKD (GFR = 15-29 mL/min/1 73 square meters)  •  Stage 5 End Stage CKD (GFR <15 mL/min/1 73 square meters)  Note: GFR calculation is accurate only with a steady state creatinine                 No orders to display Procedures  Procedures         ED Course                                             Medical Decision Making  Chest pain: acute illness or injury  Amount and/or Complexity of Data Reviewed  Labs: ordered  Disposition  Final diagnoses:   Chest pain     Time reflects when diagnosis was documented in both MDM as applicable and the Disposition within this note     Time User Action Codes Description Comment    3/9/2023  9:44 PM Yamile Craven Add [R07 9] Chest pain       ED Disposition     ED Disposition   Discharge    Condition   Stable    Date/Time   Thu Mar 9, 2023  9:44 PM    Comment   Lyle Ratliff discharge to home/self care                 Follow-up Information     Follow up With Specialties Details Why Contact Info    Lori Lai MD [facility]   Dept of [address] [facility]  [address] [place] [address]  [phone]            Discharge Medication List as of 3/9/2023  9:44 PM      CONTINUE these medications which have NOT CHANGED    Details   albuterol (PROVENTIL HFA,VENTOLIN HFA) 90 mcg/act inhaler Inhale 2 puffs every 6 (six) hours as needed for wheezing, Historical Med      aspirin (ECOTRIN LOW STRENGTH) 81 mg EC tablet 81 mg 2 (two) times a week, Starting Thu 1/20/2022, Historical Med      budesonide-formoterol (SYMBICORT) 160-4 5 mcg/act inhaler Inhale 2 puffs 2 (two) times a day Rinse mouth after use , Historical Med      CETIRIZINE HCL PO Take 1 tablet by mouth in the morning, Historical Med      chlorhexidine (PERIDEX) 0 12 % solution Apply 15 mL to the mouth or throat 4 (four) times a day (with meals and at bedtime) After meals and before bed, Starting Sat 2/19/2022, Print      Cholecalciferol 50 MCG (2000 UT) TABS TAKE ONE TABLET BY MOUTH EVERY DAY (FOR LOW VITAMIN D), Historical Med      Coenzyme Q10 100 MG TABS TAKE 400MG  BY MOUTH EVERY MORNING, Historical Med      ferrous gluconate (FERGON) 324 mg tablet Take 324 mg by mouth daily with breakfast, Historical Med      finasteride (PROSCAR) 5 mg tablet Take 5 mg by mouth daily, Starting Thu 1/6/2022, Historical Med      fluticasone (FLONASE) 50 mcg/act nasal spray INSTILL 2 SPRAYS IN NOSTRIL(S) EVERY DAY AS NEEDED FOR NASAL CONGESTION, Historical Med      ketoconazole (NIZORAL) 2 % cream Historical Med      latanoprost (XALATAN) 0 005 % ophthalmic solution INSTILL 1 DROP IN BOTH EYES AT BEDTIME FOR GLAUCOMA-PROTECT BOTTLE FROM LIGHT , Historical Med      lisinopril (ZESTRIL) 5 mg tablet Take 5 mg by mouth, Starting Thu 1/6/2022, Historical Med      metFORMIN (GLUCOPHAGE) 500 mg tablet Take 500 mg by mouth daily with breakfast, Starting Thu 1/6/2022, Historical Med      Multiple Vitamin (MULTIVITAMINS PO) TAKE ONE CAP/TAB BY MOUTH EVERY DAY FOR SUPPLEMENTATION, Historical Med      omeprazole (PriLOSEC) 20 mg delayed release capsule 20 mg, Starting Mon 4/11/2022, Historical Med             No discharge procedures on file      PDMP Review     None          ED Provider  Electronically Signed by           Vera Anderson DO  03/19/23 6320

## 2023-06-28 ENCOUNTER — APPOINTMENT (OUTPATIENT)
Dept: LAB | Facility: HOSPITAL | Age: 78
End: 2023-06-28
Attending: OTOLARYNGOLOGY
Payer: COMMERCIAL

## 2023-06-28 DIAGNOSIS — K11.8 PAROTID MASS: ICD-10-CM

## 2023-06-28 LAB
ANION GAP SERPL CALCULATED.3IONS-SCNC: 6 MMOL/L
BASOPHILS # BLD AUTO: 0.01 THOUSANDS/ÂΜL (ref 0–0.1)
BASOPHILS NFR BLD AUTO: 0 % (ref 0–1)
BUN SERPL-MCNC: 29 MG/DL (ref 5–25)
CALCIUM SERPL-MCNC: 9.6 MG/DL (ref 8.4–10.2)
CHLORIDE SERPL-SCNC: 104 MMOL/L (ref 96–108)
CO2 SERPL-SCNC: 28 MMOL/L (ref 21–32)
CREAT SERPL-MCNC: 1.17 MG/DL (ref 0.6–1.3)
EOSINOPHIL # BLD AUTO: 0.26 THOUSAND/ÂΜL (ref 0–0.61)
EOSINOPHIL NFR BLD AUTO: 5 % (ref 0–6)
ERYTHROCYTE [DISTWIDTH] IN BLOOD BY AUTOMATED COUNT: 14.4 % (ref 11.6–15.1)
GFR SERPL CREATININE-BSD FRML MDRD: 58 ML/MIN/1.73SQ M
GLUCOSE SERPL-MCNC: 87 MG/DL (ref 65–140)
HCT VFR BLD AUTO: 38.7 % (ref 36.5–49.3)
HGB BLD-MCNC: 12 G/DL (ref 12–17)
IMM GRANULOCYTES # BLD AUTO: 0.02 THOUSAND/UL (ref 0–0.2)
IMM GRANULOCYTES NFR BLD AUTO: 0 % (ref 0–2)
LYMPHOCYTES # BLD AUTO: 1.79 THOUSANDS/ÂΜL (ref 0.6–4.47)
LYMPHOCYTES NFR BLD AUTO: 31 % (ref 14–44)
MCH RBC QN AUTO: 22.3 PG (ref 26.8–34.3)
MCHC RBC AUTO-ENTMCNC: 31 G/DL (ref 31.4–37.4)
MCV RBC AUTO: 72 FL (ref 82–98)
MONOCYTES # BLD AUTO: 0.48 THOUSAND/ÂΜL (ref 0.17–1.22)
MONOCYTES NFR BLD AUTO: 8 % (ref 4–12)
NEUTROPHILS # BLD AUTO: 3.23 THOUSANDS/ÂΜL (ref 1.85–7.62)
NEUTS SEG NFR BLD AUTO: 56 % (ref 43–75)
NRBC BLD AUTO-RTO: 0 /100 WBCS
PLATELET # BLD AUTO: 258 THOUSANDS/UL (ref 149–390)
PMV BLD AUTO: 10.3 FL (ref 8.9–12.7)
POTASSIUM SERPL-SCNC: 3.9 MMOL/L (ref 3.5–5.3)
RBC # BLD AUTO: 5.37 MILLION/UL (ref 3.88–5.62)
SODIUM SERPL-SCNC: 138 MMOL/L (ref 135–147)
WBC # BLD AUTO: 5.79 THOUSAND/UL (ref 4.31–10.16)

## 2023-06-28 PROCEDURE — 36415 COLL VENOUS BLD VENIPUNCTURE: CPT

## 2023-06-28 PROCEDURE — 85025 COMPLETE CBC W/AUTO DIFF WBC: CPT

## 2023-06-28 PROCEDURE — 80048 BASIC METABOLIC PNL TOTAL CA: CPT

## 2023-06-29 RX ORDER — CHLORAL HYDRATE 500 MG
1000 CAPSULE ORAL DAILY
COMMUNITY

## 2023-06-29 RX ORDER — ALFUZOSIN HYDROCHLORIDE 10 MG/1
10 TABLET, EXTENDED RELEASE ORAL DAILY
COMMUNITY

## 2023-06-29 RX ORDER — ACETAMINOPHEN 500 MG
500 TABLET ORAL EVERY 6 HOURS PRN
COMMUNITY

## 2023-06-29 NOTE — PRE-PROCEDURE INSTRUCTIONS
Pre-Surgery Instructions:   Medication Instructions   • acetaminophen (TYLENOL) 500 mg tablet Uses PRN- OK to take day of surgery   • albuterol (PROVENTIL HFA,VENTOLIN HFA) 90 mcg/act inhaler Uses PRN- OK to take day of surgery   • alfuzosin (UROXATRAL) 10 mg 24 hr tablet Hold day of surgery. • aspirin (ECOTRIN LOW STRENGTH) 81 mg EC tablet Stop taking 7 days prior to surgery. • atorvastatin (LIPITOR) 40 mg tablet Take night before surgery   • budesonide-formoterol (SYMBICORT) 160-4.5 mcg/act inhaler Take day of surgery. • CETIRIZINE HCL PO Take day of surgery. • chlorhexidine (PERIDEX) 0.12 % solution Hold day of surgery. • Cholecalciferol 50 MCG (2000 UT) TABS Stop taking 7 days prior to surgery. • Coenzyme Q10 100 MG TABS Stop taking 7 days prior to surgery. • ferrous gluconate (FERGON) 324 mg tablet Hold day of surgery. • finasteride (PROSCAR) 5 mg tablet Take night before surgery   • fluticasone (FLONASE) 50 mcg/act nasal spray Uses PRN- OK to take day of surgery   • ketoconazole (NIZORAL) 2 % cream Hold day of surgery. • latanoprost (XALATAN) 0.005 % ophthalmic solution Take day of surgery. • lisinopril (ZESTRIL) 5 mg tablet Hold day of surgery. • metFORMIN (GLUCOPHAGE) 500 mg tablet Hold day of surgery. • Multiple Vitamin (MULTIVITAMINS PO) Stop taking 7 days prior to surgery. • Omega-3 Fatty Acids (fish oil) 1,000 mg Stop taking 7 days prior to surgery. • omeprazole (PriLOSEC) 20 mg delayed release capsule Take day of surgery. Medication instructions for day surgery reviewed. Please use only a sip of water to take your instructed medications. Avoid all over the counter vitamins, supplements and NSAIDS for one week prior to surgery per anesthesia guidelines. Tylenol is ok to take as needed. You will receive a call one business day prior to surgery with an arrival time and hospital directions.  If your surgery is scheduled on a Monday, the hospital will be calling you on the Friday prior to your surgery. If you have not heard from anyone by 8pm, please call the hospital supervisor through the hospital  at [phone]. Leandro Gutierrez [phone]). Do not eat or drink anything after midnight the night before your surgery, including candy, mints, lifesavers, or chewing gum. Do not drink alcohol 24hrs before your surgery. Try not to smoke at least 24hrs before your surgery. Follow the pre surgery showering instructions as listed in the [facility] Surgical Experience Booklet” or otherwise provided by your surgeon's office. Do not shave the surgical area 24 hours before surgery. Do not apply any lotions, creams, including makeup, cologne, deodorant, or perfumes after showering on the day of your surgery. No contact lenses, eye make-up, or artificial eyelashes. Remove nail polish, including gel polish, and any artificial, gel, or acrylic nails if possible. Remove all jewelry including rings and body piercing jewelry. Wear causal clothing that is easy to take on and off. Consider your type of surgery. Keep any valuables, jewelry, piercings at home. Please bring any specially ordered equipment (sling, braces) if indicated. Arrange for a responsible person to drive you to and from the hospital on the day of your surgery. Visitor Guidelines discussed. Call the surgeon's office with any new illnesses, exposures, or additional questions prior to surgery. Please reference your [facility] Surgical Experience Booklet” for additional information to prepare for your upcoming surgery.

## 2023-07-05 ENCOUNTER — APPOINTMENT (OUTPATIENT)
Dept: LAB | Facility: HOSPITAL | Age: 78
End: 2023-07-05
Payer: COMMERCIAL

## 2023-07-06 ENCOUNTER — TRANSCRIBE ORDERS (OUTPATIENT)
Dept: ADMINISTRATIVE | Facility: HOSPITAL | Age: 78
End: 2023-07-06

## 2023-07-06 ENCOUNTER — APPOINTMENT (OUTPATIENT)
Dept: LAB | Facility: HOSPITAL | Age: 78
End: 2023-07-06
Attending: OTOLARYNGOLOGY
Payer: COMMERCIAL

## 2023-07-06 ENCOUNTER — ANESTHESIA EVENT (OUTPATIENT)
Dept: PERIOP | Facility: HOSPITAL | Age: 78
End: 2023-07-06
Payer: COMMERCIAL

## 2023-07-06 DIAGNOSIS — Z01.818 PRE-OP TESTING: ICD-10-CM

## 2023-07-06 PROCEDURE — 36415 COLL VENOUS BLD VENIPUNCTURE: CPT

## 2023-07-06 PROCEDURE — 83036 HEMOGLOBIN GLYCOSYLATED A1C: CPT

## 2023-07-09 LAB
EST. AVERAGE GLUCOSE BLD GHB EST-MCNC: 126 MG/DL
HBA1C MFR BLD: 6 %

## 2023-07-11 NOTE — ANESTHESIA PREPROCEDURE EVALUATION
Procedure:  PAROTID SIALOENDOSCOPY WITH WASHOUT (Mouth)    Relevant Problems   GI/HEPATIC   (+) Dilated pancreatic duct      /RENAL   (+) Benign prostatic hyperplasia without urinary obstruction      HEMATOLOGY   (+) Anemia      PULMONARY   (+) Asthma-chronic obstructive pulmonary disease overlap syndrome (HCC)      Lab Results   Component Value Date    WBC 5.79 06/28/2023    HGB 12.0 06/28/2023    HCT 38.7 06/28/2023    MCV 72 (L) 06/28/2023     06/28/2023         Physical Exam    Airway    Mallampati score: I  TM Distance: >3 FB  Neck ROM: full     Dental   No notable dental hx     Cardiovascular  Rhythm: regular, Rate: normal,     Pulmonary  Breath sounds clear to auscultation,     Other Findings  Intercisor Distance > 3cm          Anesthesia Plan  ASA Score- 2     Anesthesia Type- general with ASA Monitors. Additional Monitors:   Airway Plan: ETT. Comment: Discussed benefits/risks of general anesthesia including possibility of mouth/throat pain, injury to lips/teeth, nausea/vomiting, and surgical pain along with more rare complications such as stroke, MI, pneumonia, aspiration, and injury to blood vessels. Patient understands and wishes to proceed. All questions answered. .       Plan Factors-Exercise tolerance (METS): >4 METS. Chart reviewed. EKG reviewed. Existing labs reviewed. Induction- intravenous. Postoperative Plan- Plan for postoperative opioid use. Planned trial extubation    Informed Consent- Anesthetic plan and risks discussed with patient. I personally reviewed this patient with the CRNA. Discussed and agreed on the Anesthesia Plan with the CRNA. Elizabeth Brooms

## 2023-07-12 ENCOUNTER — HOSPITAL ENCOUNTER (OUTPATIENT)
Facility: HOSPITAL | Age: 78
Setting detail: OUTPATIENT SURGERY
Discharge: HOME | End: 2023-07-12
Attending: OTOLARYNGOLOGY | Admitting: OTOLARYNGOLOGY
Payer: COMMERCIAL

## 2023-07-12 ENCOUNTER — ANESTHESIA (OUTPATIENT)
Dept: PERIOP | Facility: HOSPITAL | Age: 78
End: 2023-07-12
Payer: COMMERCIAL

## 2023-07-12 VITALS
DIASTOLIC BLOOD PRESSURE: 79 MMHG | RESPIRATION RATE: 18 BRPM | WEIGHT: 185 LBS | TEMPERATURE: 98 F | HEIGHT: 69 IN | HEART RATE: 73 BPM | SYSTOLIC BLOOD PRESSURE: 148 MMHG | BODY MASS INDEX: 27.4 KG/M2 | OXYGEN SATURATION: 99 %

## 2023-07-12 DIAGNOSIS — Z01.818 PRE-OP TESTING: Primary | ICD-10-CM

## 2023-07-12 PROCEDURE — 83036 HEMOGLOBIN GLYCOSYLATED A1C: CPT | Performed by: ANESTHESIOLOGY

## 2023-07-12 PROCEDURE — 42650 DILATION OF SALIVARY DUCT: CPT | Performed by: OTOLARYNGOLOGY

## 2023-07-12 PROCEDURE — 82948 REAGENT STRIP/BLOOD GLUCOSE: CPT

## 2023-07-12 RX ORDER — SODIUM CHLORIDE 9 MG/ML
INJECTION INTRAVENOUS AS NEEDED
Status: DISCONTINUED | OUTPATIENT
Start: 2023-07-12 | End: 2023-07-12 | Stop reason: HOSPADM

## 2023-07-12 RX ORDER — SODIUM CHLORIDE, SODIUM LACTATE, POTASSIUM CHLORIDE, CALCIUM CHLORIDE 600; 310; 30; 20 MG/100ML; MG/100ML; MG/100ML; MG/100ML
INJECTION, SOLUTION INTRAVENOUS CONTINUOUS PRN
Status: DISCONTINUED | OUTPATIENT
Start: 2023-07-12 | End: 2023-07-12

## 2023-07-12 RX ORDER — ONDANSETRON 2 MG/ML
INJECTION INTRAMUSCULAR; INTRAVENOUS AS NEEDED
Status: DISCONTINUED | OUTPATIENT
Start: 2023-07-12 | End: 2023-07-12

## 2023-07-12 RX ORDER — DEXAMETHASONE SODIUM PHOSPHATE 10 MG/ML
INJECTION, SOLUTION INTRAMUSCULAR; INTRAVENOUS AS NEEDED
Status: DISCONTINUED | OUTPATIENT
Start: 2023-07-12 | End: 2023-07-12

## 2023-07-12 RX ORDER — ONDANSETRON 2 MG/ML
4 INJECTION INTRAMUSCULAR; INTRAVENOUS ONCE AS NEEDED
Status: DISCONTINUED | OUTPATIENT
Start: 2023-07-12 | End: 2023-07-12 | Stop reason: HOSPADM

## 2023-07-12 RX ORDER — ROCURONIUM BROMIDE 10 MG/ML
INJECTION, SOLUTION INTRAVENOUS AS NEEDED
Status: DISCONTINUED | OUTPATIENT
Start: 2023-07-12 | End: 2023-07-12

## 2023-07-12 RX ORDER — LIDOCAINE HYDROCHLORIDE 20 MG/ML
INJECTION, SOLUTION EPIDURAL; INFILTRATION; INTRACAUDAL; PERINEURAL AS NEEDED
Status: DISCONTINUED | OUTPATIENT
Start: 2023-07-12 | End: 2023-07-12

## 2023-07-12 RX ORDER — TRIAMCINOLONE ACETONIDE 40 MG/ML
INJECTION, SUSPENSION INTRA-ARTICULAR; INTRAMUSCULAR AS NEEDED
Status: DISCONTINUED | OUTPATIENT
Start: 2023-07-12 | End: 2023-07-12 | Stop reason: HOSPADM

## 2023-07-12 RX ORDER — FENTANYL CITRATE 50 UG/ML
INJECTION, SOLUTION INTRAMUSCULAR; INTRAVENOUS AS NEEDED
Status: DISCONTINUED | OUTPATIENT
Start: 2023-07-12 | End: 2023-07-12

## 2023-07-12 RX ORDER — MAGNESIUM HYDROXIDE 1200 MG/15ML
LIQUID ORAL AS NEEDED
Status: DISCONTINUED | OUTPATIENT
Start: 2023-07-12 | End: 2023-07-12 | Stop reason: HOSPADM

## 2023-07-12 RX ORDER — SODIUM CHLORIDE, SODIUM LACTATE, POTASSIUM CHLORIDE, CALCIUM CHLORIDE 600; 310; 30; 20 MG/100ML; MG/100ML; MG/100ML; MG/100ML
20 INJECTION, SOLUTION INTRAVENOUS CONTINUOUS
Status: DISCONTINUED | OUTPATIENT
Start: 2023-07-12 | End: 2023-07-12 | Stop reason: HOSPADM

## 2023-07-12 RX ORDER — PROPOFOL 10 MG/ML
INJECTION, EMULSION INTRAVENOUS AS NEEDED
Status: DISCONTINUED | OUTPATIENT
Start: 2023-07-12 | End: 2023-07-12

## 2023-07-12 RX ORDER — PHENYLEPHRINE HCL IN 0.9% NACL 1 MG/10 ML
SYRINGE (ML) INTRAVENOUS AS NEEDED
Status: DISCONTINUED | OUTPATIENT
Start: 2023-07-12 | End: 2023-07-12

## 2023-07-12 RX ORDER — FENTANYL CITRATE/PF 50 MCG/ML
25 SYRINGE (ML) INJECTION
Status: DISCONTINUED | OUTPATIENT
Start: 2023-07-12 | End: 2023-07-12 | Stop reason: HOSPADM

## 2023-07-12 RX ADMIN — LIDOCAINE HYDROCHLORIDE 60 MG: 20 INJECTION, SOLUTION EPIDURAL; INFILTRATION; INTRACAUDAL; PERINEURAL at 15:07

## 2023-07-12 RX ADMIN — PROPOFOL 150 MG: 10 INJECTION, EMULSION INTRAVENOUS at 15:07

## 2023-07-12 RX ADMIN — SODIUM CHLORIDE, SODIUM LACTATE, POTASSIUM CHLORIDE, AND CALCIUM CHLORIDE: .6; .31; .03; .02 INJECTION, SOLUTION INTRAVENOUS at 15:03

## 2023-07-12 RX ADMIN — Medication 100 MCG: at 15:25

## 2023-07-12 RX ADMIN — ONDANSETRON 4 MG: 2 INJECTION INTRAMUSCULAR; INTRAVENOUS at 15:07

## 2023-07-12 RX ADMIN — DEXAMETHASONE SODIUM PHOSPHATE 10 MG: 10 INJECTION, SOLUTION INTRAMUSCULAR; INTRAVENOUS at 15:07

## 2023-07-12 RX ADMIN — FENTANYL CITRATE 100 MCG: 50 INJECTION INTRAMUSCULAR; INTRAVENOUS at 15:07

## 2023-07-12 RX ADMIN — ROCURONIUM BROMIDE 40 MG: 10 INJECTION, SOLUTION INTRAVENOUS at 15:07

## 2023-07-12 RX ADMIN — SUGAMMADEX 200 MG: 100 INJECTION, SOLUTION INTRAVENOUS at 15:36

## 2023-07-12 NOTE — ANESTHESIA POSTPROCEDURE EVALUATION
Post-Op Assessment Note    CV Status:  Stable  Pain Score: 0    Pain management: adequate     Mental Status:  Alert and awake   Hydration Status:  Euvolemic   PONV Controlled:  Controlled   Airway Patency:  Patent  Airway: intubated      Post Op Vitals Reviewed: Yes      Staff: CRNA         No notable events documented.     BP   156/75   Temp   97   Pulse  73   Resp   17   SpO2   100

## 2023-07-12 NOTE — DISCHARGE INSTR - AVS FIRST PAGE
Advance diet as tolerated  Tylenol and ibuprofen for pain  Follow-up with Dr. Catalina Braxton at previously scheduled follow-up appointment

## 2023-07-12 NOTE — H&P
Otolaryngology Pre-op note/History and Physical    Date of service: 7/12/20232:41 PM      Patrick Bean is a 77 y.o. male with a history of postprandial parotid swelling, L. Patient to OR for L parotid sialendsocopy with washout. Pmh: Reviewed  Pshx: Reviewed  Social history: Reviewed  Medications: Reviewed  ROS: as above      Vitals:    07/12/23 1239   BP: 133/76   Pulse: 86   Resp: 16   Temp: 97.6 °F (36.4 °C)   SpO2: 100%       ENT: unremarkable  Chest/Heart/Lungs: Breathing, perfused, unremarkable  Abd: Unremarkable  Ext: Unremarkable      The procedure was discussed with the patient, including risks, benefits, and alternatives and all questions were answered. Consent signed and in the chart.

## 2023-07-13 LAB
EST. AVERAGE GLUCOSE BLD GHB EST-MCNC: 126 MG/DL
GLUCOSE SERPL-MCNC: 95 MG/DL (ref 65–140)
HBA1C MFR BLD: 6 %

## 2023-07-17 NOTE — OP NOTE
OPERATIVE REPORT  PATIENT NAME: Cassandra Atwood    :  1945  MRN: [id number]  Pt Location: AN OR ROOM 03    SURGERY DATE: 2023    Surgeon(s) and Role:     * Suzette Argueta MD - Primary     * Adelaida Perea MD - Assisting     * Myke Becker DMD - Assisting    Preop Diagnosis:  Parotid mass [K11.8]    Post-Op Diagnosis Codes:     * Parotid mass [K11.8]    Procedure(s):  PAROTID SIALOENDOSCOPY WITH WASHOUT    Specimen(s):  * No specimens in log *    Estimated Blood Loss:   Minimal    Drains:  * No LDAs found *    Anesthesia Type:   General    Operative Indications:  Parotid mass [K11.8]      Operative Findings:  Normal L parotid duct    Complications:   None    Procedure and Technique:  The patient is taken to the operating room. Patient was placed supine on the operating table and general anesthesia was induced. Pt was intubated uneventfully and then prepped and draped in the usual fashion. A time-out was performed the patient's identity and procedure were then confirmed. The table was then turned 90°. A bite block was placed on the right to aid in opening the mouth. The tongue and buccal mucosa were then retracted. The buccal mucosa was dried with a Ray-Clara and gentle pressure was placed in the left parotid gland. The adjacent mucosa was then gently grasped and retracted anteriorly. The papilla was then dilated from 000 to 5. Ultimately the conical dilator I was able to further dilate the papilla and was able to place the 7. At this point the 1.6 mm endoscope was placed into the duct. The endoscope was then placed back into the duct in the entirety of the duct system evaluated and found to be unremarkable. The gland was irrigated. 1cc kenalog 40cc was used in the irrigation of the gland. Patient then turned back to anesthesia where he was woken and extubated. Patient tolerated the procedure well without complication.      I was present for the entire procedure.     Patient Disposition:  PACU         SIGNATURE: Huma Le MD  DATE: July 17, 2023  TIME: 7:09 PM

## 2023-08-12 ENCOUNTER — APPOINTMENT (EMERGENCY)
Dept: CT IMAGING | Facility: HOSPITAL | Age: 78
End: 2023-08-12
Payer: COMMERCIAL

## 2023-08-12 ENCOUNTER — HOSPITAL ENCOUNTER (EMERGENCY)
Facility: HOSPITAL | Age: 78
Discharge: HOME | End: 2023-08-12
Attending: EMERGENCY MEDICINE
Payer: COMMERCIAL

## 2023-08-12 VITALS
DIASTOLIC BLOOD PRESSURE: 70 MMHG | SYSTOLIC BLOOD PRESSURE: 125 MMHG | OXYGEN SATURATION: 99 % | TEMPERATURE: 98.4 F | HEART RATE: 64 BPM | RESPIRATION RATE: 17 BRPM

## 2023-08-12 DIAGNOSIS — K59.00 CONSTIPATION: Primary | ICD-10-CM

## 2023-08-12 DIAGNOSIS — R10.9 ABDOMINAL PAIN, UNSPECIFIED ABDOMINAL LOCATION: ICD-10-CM

## 2023-08-12 LAB
ALBUMIN SERPL BCP-MCNC: 4.9 G/DL (ref 3.5–5)
ALP SERPL-CCNC: 62 U/L (ref 34–104)
ALT SERPL W P-5'-P-CCNC: 26 U/L (ref 7–52)
ANION GAP SERPL CALCULATED.3IONS-SCNC: 6 MMOL/L
AST SERPL W P-5'-P-CCNC: 27 U/L (ref 13–39)
BASOPHILS # BLD AUTO: 0.01 THOUSANDS/ÂΜL (ref 0–0.1)
BASOPHILS NFR BLD AUTO: 0 % (ref 0–1)
BILIRUB SERPL-MCNC: 0.69 MG/DL (ref 0.2–1)
BILIRUB UR QL STRIP: NEGATIVE
BUN SERPL-MCNC: 27 MG/DL (ref 5–25)
CALCIUM SERPL-MCNC: 9.9 MG/DL (ref 8.4–10.2)
CHLORIDE SERPL-SCNC: 102 MMOL/L (ref 96–108)
CLARITY UR: CLEAR
CO2 SERPL-SCNC: 28 MMOL/L (ref 21–32)
COLOR UR: NORMAL
CREAT SERPL-MCNC: 1.14 MG/DL (ref 0.6–1.3)
EOSINOPHIL # BLD AUTO: 0.24 THOUSAND/ÂΜL (ref 0–0.61)
EOSINOPHIL NFR BLD AUTO: 5 % (ref 0–6)
ERYTHROCYTE [DISTWIDTH] IN BLOOD BY AUTOMATED COUNT: 14.9 % (ref 11.6–15.1)
GFR SERPL CREATININE-BSD FRML MDRD: 60 ML/MIN/1.73SQ M
GLUCOSE SERPL-MCNC: 85 MG/DL (ref 65–140)
GLUCOSE UR STRIP-MCNC: NEGATIVE MG/DL
HCT VFR BLD AUTO: 42.3 % (ref 36.5–49.3)
HGB BLD-MCNC: 13 G/DL (ref 12–17)
HGB UR QL STRIP.AUTO: NEGATIVE
IMM GRANULOCYTES # BLD AUTO: 0.01 THOUSAND/UL (ref 0–0.2)
IMM GRANULOCYTES NFR BLD AUTO: 0 % (ref 0–2)
KETONES UR STRIP-MCNC: NEGATIVE MG/DL
LACTATE SERPL-SCNC: 1 MMOL/L (ref 0.5–2)
LEUKOCYTE ESTERASE UR QL STRIP: NEGATIVE
LIPASE SERPL-CCNC: 28 U/L (ref 11–82)
LYMPHOCYTES # BLD AUTO: 1.88 THOUSANDS/ÂΜL (ref 0.6–4.47)
LYMPHOCYTES NFR BLD AUTO: 39 % (ref 14–44)
MCH RBC QN AUTO: 22.5 PG (ref 26.8–34.3)
MCHC RBC AUTO-ENTMCNC: 30.7 G/DL (ref 31.4–37.4)
MCV RBC AUTO: 73 FL (ref 82–98)
MONOCYTES # BLD AUTO: 0.45 THOUSAND/ÂΜL (ref 0.17–1.22)
MONOCYTES NFR BLD AUTO: 9 % (ref 4–12)
NEUTROPHILS # BLD AUTO: 2.21 THOUSANDS/ÂΜL (ref 1.85–7.62)
NEUTS SEG NFR BLD AUTO: 47 % (ref 43–75)
NITRITE UR QL STRIP: NEGATIVE
NRBC BLD AUTO-RTO: 0 /100 WBCS
PH UR STRIP.AUTO: 5.5 [PH]
PLATELET # BLD AUTO: 227 THOUSANDS/UL (ref 149–390)
PMV BLD AUTO: 10.4 FL (ref 8.9–12.7)
POTASSIUM SERPL-SCNC: 3.9 MMOL/L (ref 3.5–5.3)
PROT SERPL-MCNC: 8.7 G/DL (ref 6.4–8.4)
PROT UR STRIP-MCNC: NEGATIVE MG/DL
RBC # BLD AUTO: 5.78 MILLION/UL (ref 3.88–5.62)
SODIUM SERPL-SCNC: 136 MMOL/L (ref 135–147)
SP GR UR STRIP.AUTO: 1.02 (ref 1–1.03)
UROBILINOGEN UR STRIP-ACNC: <2 MG/DL
WBC # BLD AUTO: 4.8 THOUSAND/UL (ref 4.31–10.16)

## 2023-08-12 PROCEDURE — 85025 COMPLETE CBC W/AUTO DIFF WBC: CPT | Performed by: NURSE PRACTITIONER

## 2023-08-12 PROCEDURE — 99284 EMERGENCY DEPT VISIT MOD MDM: CPT

## 2023-08-12 PROCEDURE — 83605 ASSAY OF LACTIC ACID: CPT | Performed by: NURSE PRACTITIONER

## 2023-08-12 PROCEDURE — 36415 COLL VENOUS BLD VENIPUNCTURE: CPT | Performed by: NURSE PRACTITIONER

## 2023-08-12 PROCEDURE — 74177 CT ABD & PELVIS W/CONTRAST: CPT

## 2023-08-12 PROCEDURE — 99285 EMERGENCY DEPT VISIT HI MDM: CPT | Performed by: NURSE PRACTITIONER

## 2023-08-12 PROCEDURE — 80053 COMPREHEN METABOLIC PANEL: CPT | Performed by: NURSE PRACTITIONER

## 2023-08-12 PROCEDURE — 83690 ASSAY OF LIPASE: CPT | Performed by: NURSE PRACTITIONER

## 2023-08-12 PROCEDURE — NC001 PR NO CHARGE: Performed by: NURSE PRACTITIONER

## 2023-08-12 PROCEDURE — 81003 URINALYSIS AUTO W/O SCOPE: CPT | Performed by: NURSE PRACTITIONER

## 2023-08-12 RX ORDER — LACTULOSE 20 G/30ML
20 SOLUTION ORAL DAILY
Qty: 90 ML | Refills: 0 | Status: SHIPPED | OUTPATIENT
Start: 2023-08-12 | End: 2023-08-15

## 2023-08-12 RX ADMIN — IOHEXOL 100 ML: 350 INJECTION, SOLUTION INTRAVENOUS at 13:21

## 2023-08-12 NOTE — ED PROVIDER NOTES
History  Chief Complaint   Patient presents with   • Abdominal Pain     Pt c/o left sided abdominal pain x 1 week, pt has hx of diverticulitis      69-year-old male patient with complaints of left-sided abdominal discomfort for about a week. He has a history of diverticulitis is concerned for this. He denies any diarrhea or fever. He has minimal reproducible pain with palpation of the left lower quadrant. No reports of fever. No alleviating or exacerbating factors. Abdominal Pain  Associated symptoms: no chest pain, no chills, no cough, no diarrhea, no dysuria, no fever, no hematuria, no nausea, no shortness of breath, no sore throat and no vomiting        Prior to Admission Medications   Prescriptions Last Dose Informant Patient Reported? Taking?    CETIRIZINE HCL PO  Self Yes No   Sig: Take 1 tablet by mouth in the morning   Cholecalciferol 50 MCG (2000 UT) TABS  Self Yes No   Sig: TAKE ONE TABLET BY MOUTH EVERY DAY (FOR LOW VITAMIN D)   Coenzyme Q10 100 MG TABS  Self Yes No   Sig: TAKE 400MG  BY MOUTH EVERY MORNING   Docusate Sodium (DSS) 100 MG CAPS   Yes No   Si mg   Multiple Vitamin (MULTIVITAMINS PO)  Self Yes No   Sig: TAKE ONE CAP/TAB BY MOUTH EVERY DAY FOR SUPPLEMENTATION   Omega-3 Fatty Acids (fish oil) 1,000 mg   Yes No   Sig: Take 1,000 mg by mouth daily   acetaminophen (TYLENOL) 500 mg tablet   Yes No   Sig: Take 500 mg by mouth every 6 (six) hours as needed for mild pain   albuterol (PROVENTIL HFA,VENTOLIN HFA) 90 mcg/act inhaler  Self Yes No   Sig: Inhale 2 puffs every 6 (six) hours as needed for wheezing   alfuzosin (UROXATRAL) 10 mg 24 hr tablet   Yes No   Sig: Take 10 mg by mouth daily   aspirin (ECOTRIN LOW STRENGTH) 81 mg EC tablet  Self Yes No   Si mg daily   atorvastatin (LIPITOR) 40 mg tablet   Yes No   Sig: TAKE 20MG (ONE-HALF TABLET) BY MOUTH EVERY DAY FOR HYPERLIPIDEMIA   budesonide-formoterol (SYMBICORT) 160-4.5 mcg/act inhaler  Self Yes No   Sig: Inhale 2 puffs 2 (two) times a day Rinse mouth after use. chlorhexidine (PERIDEX) 0.12 % solution  Self No No   Sig: Apply 15 mL to the mouth or throat 4 (four) times a day (with meals and at bedtime) After meals and before bed   ferrous gluconate (FERGON) 324 mg tablet  Self Yes No   Sig: Take 324 mg by mouth daily with breakfast   ferrous sulfate 325 (65 Fe) mg tablet   Yes No   Si mg   finasteride (PROSCAR) 5 mg tablet  Self Yes No   Sig: Take 5 mg by mouth daily   fluticasone (FLONASE) 50 mcg/act nasal spray  Self Yes No   Sig: INSTILL 2 SPRAYS IN NOSTRIL(S) EVERY DAY AS NEEDED FOR NASAL CONGESTION   ketoconazole (NIZORAL) 2 % cream  Self Yes No   latanoprost (XALATAN) 0.005 % ophthalmic solution  Self Yes No   Sig: INSTILL 1 DROP IN BOTH EYES AT BEDTIME FOR GLAUCOMA-PROTECT BOTTLE FROM LIGHT. lisinopril (ZESTRIL) 5 mg tablet  Self Yes No   Sig: Take 5 mg by mouth   metFORMIN (GLUCOPHAGE) 500 mg tablet  Self Yes No   Sig: Take 500 mg by mouth daily with breakfast   omeprazole (PriLOSEC) 20 mg delayed release capsule  Self Yes No   Si mg   oxyCODONE-acetaminophen (PERCOCET) 5-325 mg per tablet   Yes No   Sig: TAKE 1 TABLET BY MOUTH EVERY 6 HOURS AS NEEDED FOR PAIN. RX MUST LAST UNTIL 2023.       Facility-Administered Medications: None       Past Medical History:   Diagnosis Date   • BPH (benign prostatic hyperplasia)    • Diabetes mellitus ([address])    • GERD (gastroesophageal reflux disease)    • Hypertension        Past Surgical History:   Procedure Laterality Date   • COLONOSCOPY     • HERNIA REPAIR     • SUBMANDIBULAR DUCT STONE EXCISION N/A 2023    Procedure: PAROTID SIALOENDOSCOPY WITH WASHOUT;  Surgeon: Huma Le MD;  [facility];  Service: ENT   • TONSILLECTOMY     • UPPER GASTROINTESTINAL ENDOSCOPY         Family History   Problem Relation Age of Onset   • Lupus Mother    • No Known Problems Father    • Crohn's disease Sister      I have reviewed and agree with the history as documented. E-Cigarette/Vaping   • E-Cigarette Use Never User      E-Cigarette/Vaping Substances   • Nicotine No      Social History     Tobacco Use   • Smoking status: Former     Packs/day: 0.25     Years: 10.00     Total pack years: 2.50     Types: Cigarettes     Quit date: 3/5/1990     Years since quittin.4   • Smokeless tobacco: Never   Vaping Use   • Vaping Use: Never used   Substance Use Topics   • Alcohol use: Not Currently   • Drug use: Not Currently       Review of Systems   Constitutional: Negative for chills and fever. HENT: Negative for ear pain and sore throat. Eyes: Negative for pain and visual disturbance. Respiratory: Negative for cough and shortness of breath. Cardiovascular: Negative for chest pain and palpitations. Gastrointestinal: Positive for abdominal pain. Negative for diarrhea, nausea and vomiting. Genitourinary: Negative for dysuria and hematuria. Musculoskeletal: Negative for arthralgias and back pain. Skin: Negative for color change and rash. Neurological: Negative for seizures and syncope. All other systems reviewed and are negative. Physical Exam  Physical Exam  Vitals and nursing note reviewed. Constitutional:       General: He is not in acute distress. Appearance: He is well-developed. HENT:      Head: Normocephalic and atraumatic. Eyes:      General:         Right eye: No discharge. Left eye: No discharge. Conjunctiva/sclera: Conjunctivae normal.   Cardiovascular:      Rate and Rhythm: Normal rate. Pulmonary:      Effort: Pulmonary effort is normal. No respiratory distress. Abdominal:      General: There is no distension. Tenderness: There is abdominal tenderness in the left lower quadrant. There is no guarding. Musculoskeletal:         General: No deformity. Cervical back: Normal range of motion and neck supple. Skin:     General: Skin is warm and dry.    Neurological:      Mental Status: He is alert and oriented to person, place, and time.       Coordination: Coordination normal.         Vital Signs  ED Triage Vitals [08/12/23 1209]   Temperature Pulse Respirations Blood Pressure SpO2   98.4 °F (36.9 °C) 81 16 (!) 175/83 98 %      Temp Source Heart Rate Source Patient Position - Orthostatic VS BP Location FiO2 (%)   Temporal Monitor Sitting Right arm --      Pain Score       --           Vitals:    08/12/23 1209   BP: (!) 175/83   Pulse: 81   Patient Position - Orthostatic VS: Sitting         Visual Acuity      ED Medications  Medications   iohexol (OMNIPAQUE) 350 MG/ML injection (SINGLE-DOSE) 100 mL (100 mL Intravenous Given 8/12/23 1321)       Diagnostic Studies  Results Reviewed     Procedure Component Value Units Date/Time    UA w Reflex to Microscopic w Reflex to Culture [[id number]] Collected: 08/12/23 1356    Lab Status: Final result Specimen: Urine, Clean Catch Updated: 08/12/23 1409     Color, UA Light Yellow     Clarity, UA Clear     Specific Gravity, UA 1.024     pH, UA 5.5     Leukocytes, UA Negative     Nitrite, UA Negative     Protein, UA Negative mg/dl      Glucose, UA Negative mg/dl      Ketones, UA Negative mg/dl      Urobilinogen, UA <2.0 mg/dl      Bilirubin, UA Negative     Occult Blood, UA Negative    Comprehensive metabolic panel [[id number]]  (Abnormal) Collected: 08/12/23 1222    Lab Status: Final result Specimen: Blood from Arm, Right Updated: 08/12/23 1307     Sodium 136 mmol/L      Potassium 3.9 mmol/L      Chloride 102 mmol/L      CO2 28 mmol/L      ANION GAP 6 mmol/L      BUN 27 mg/dL      Creatinine 1.14 mg/dL      Glucose 85 mg/dL      Calcium 9.9 mg/dL      AST 27 U/L      ALT 26 U/L      Alkaline Phosphatase 62 U/L      Total Protein 8.7 g/dL      Albumin 4.9 g/dL      Total Bilirubin 0.69 mg/dL      eGFR 60 ml/min/1.73sq m     Narrative:      [place] guidelines for Chronic Kidney Disease (CKD):   •  Stage 1 with normal or high GFR (GFR > 90 mL/min/1.73 square meters)  •  Stage 2 Mild CKD (GFR = 60-89 mL/min/1.73 square meters)  •  Stage 3A Moderate CKD (GFR = 45-59 mL/min/1.73 square meters)  •  Stage 3B Moderate CKD (GFR = 30-44 mL/min/1.73 square meters)  •  Stage 4 Severe CKD (GFR = 15-29 mL/min/1.73 square meters)  •  Stage 5 End Stage CKD (GFR <15 mL/min/1.73 square meters)  Note: GFR calculation is accurate only with a steady state creatinine    Lipase [[id number]]  (Normal) Collected: 08/12/23 1222    Lab Status: Final result Specimen: Blood from Arm, Right Updated: 08/12/23 1307     Lipase 28 u/L     Lactic acid, plasma (w/reflex if result > 2.0) [[id number]]  (Normal) Collected: 08/12/23 1222    Lab Status: Final result Specimen: Blood from Arm, Right Updated: 08/12/23 1305     LACTIC ACID 1.0 mmol/L     Narrative:      Result may be elevated if tourniquet was used during collection. CBC and differential [[id number]]  (Abnormal) Collected: 08/12/23 1222    Lab Status: Final result Specimen: Blood from Arm, Right Updated: 08/12/23 1239     WBC 4.80 Thousand/uL      RBC 5.78 Million/uL      Hemoglobin 13.0 g/dL      Hematocrit 42.3 %      MCV 73 fL      MCH 22.5 pg      MCHC 30.7 g/dL      RDW 14.9 %      MPV 10.4 fL      Platelets 456 Thousands/uL      nRBC 0 /100 WBCs      Neutrophils Relative 47 %      Immat GRANS % 0 %      Lymphocytes Relative 39 %      Monocytes Relative 9 %      Eosinophils Relative 5 %      Basophils Relative 0 %      Neutrophils Absolute 2.21 Thousands/µL      Immature Grans Absolute 0.01 Thousand/uL      Lymphocytes Absolute 1.88 Thousands/µL      Monocytes Absolute 0.45 Thousand/µL      Eosinophils Absolute 0.24 Thousand/µL      Basophils Absolute 0.01 Thousands/µL                  CT abdomen pelvis with contrast   Final Result by Ghassan Banegas MD (08/12 1421)      Diverticulosis with no evidence of acute diverticulitis. No acute inflammatory process in the abdomen or pelvis. Other nonacute findings as described above. Workstation performed: [id number]                    Procedures  Procedures         ED Course                                             Medical Decision Making  No evidence of diverticulitis on CT scan. Abdominal discomfort likely related to stool burden. Recommend follow-up with primary care we will try short course of medication to treat constipation    Abdominal pain, unspecified abdominal location: acute illness or injury  Constipation: acute illness or injury  Amount and/or Complexity of Data Reviewed  Labs: ordered. Radiology: ordered. Risk  Prescription drug management. Disposition  Final diagnoses:   Constipation   Abdominal pain, unspecified abdominal location     Time reflects when diagnosis was documented in both MDM as applicable and the Disposition within this note     Time User Action Codes Description Comment    8/12/2023  2:35 PM An Purple Add [K59.00] Constipation     8/12/2023  2:36 PM An Purple Add [R10.9] Abdominal pain, unspecified abdominal location       ED Disposition     ED Disposition   Discharge    Condition   Stable    Date/Time   Sat Aug 12, 2023  2:35 PM    Comment   Brandon Garcia discharge to home/self care. Follow-up Information     Follow up With Specialties Details Why  [facility] [address] Emergency Department Emergency Medicine  As needed, If your symptoms worsen, or you are not improving. [address] [facility] [phone] [address] [facility] Emergency Department, [place], [place], [address]          Patient's Medications   Discharge Prescriptions    LACTULOSE 20 G/30 ML    Take 30 mL (20 g total) by mouth daily for 3 days       Start Date: 8/12/2023 End Date: 8/15/2023       Order Dose: 20 g       Quantity: 90 mL    Refills: 0       No discharge procedures on file.     PDMP Review     None          ED Provider  Electronically Signed by           Neptali Merino [facility]  08/12/23 7263

## 2023-10-03 ENCOUNTER — APPOINTMENT (EMERGENCY)
Dept: RADIOLOGY | Facility: HOSPITAL | Age: 78
End: 2023-10-03
Payer: COMMERCIAL

## 2023-10-03 ENCOUNTER — HOSPITAL ENCOUNTER (EMERGENCY)
Facility: HOSPITAL | Age: 78
Discharge: HOME | End: 2023-10-03
Attending: EMERGENCY MEDICINE
Payer: COMMERCIAL

## 2023-10-03 VITALS
HEART RATE: 85 BPM | BODY MASS INDEX: 27.31 KG/M2 | RESPIRATION RATE: 18 BRPM | WEIGHT: 184.97 LBS | SYSTOLIC BLOOD PRESSURE: 164 MMHG | TEMPERATURE: 97.4 F | DIASTOLIC BLOOD PRESSURE: 67 MMHG | OXYGEN SATURATION: 100 %

## 2023-10-03 DIAGNOSIS — M79.5 FOREIGN BODY (FB) IN SOFT TISSUE: Primary | ICD-10-CM

## 2023-10-03 PROCEDURE — 99283 EMERGENCY DEPT VISIT LOW MDM: CPT

## 2023-10-03 PROCEDURE — 99284 EMERGENCY DEPT VISIT MOD MDM: CPT | Performed by: EMERGENCY MEDICINE

## 2023-10-03 PROCEDURE — 73630 X-RAY EXAM OF FOOT: CPT

## 2023-10-03 NOTE — Clinical Note
Ritchie Cyndi was seen and treated in our emergency department on 10/3/2023. Diagnosis:     Eilleen Pump  . He may return on this date: If you have any questions or concerns, please don't hesitate to call.       Gonsalo Strong MD    ______________________________           _______________          _______________  Hospital Representative                              Date                                Time

## 2023-10-07 NOTE — ED PROVIDER NOTES
History  Chief Complaint   Patient presents with   • Foreign Body in Skin     Reports a wooden splinter stuck in L great toe for "months" reports toe is turning black     78 yo male with suspected FB in L great toe x several months. No fever. No drainage. No systemic illness. Reports his toe is turning black. Prior to Admission Medications   Prescriptions Last Dose Informant Patient Reported? Taking? CETIRIZINE HCL PO  Self Yes No   Sig: Take 1 tablet by mouth in the morning   Cholecalciferol 50 MCG (2000 UT) TABS  Self Yes No   Sig: TAKE ONE TABLET BY MOUTH EVERY DAY (FOR LOW VITAMIN D)   Coenzyme Q10 100 MG TABS  Self Yes No   Sig: TAKE 400MG  BY MOUTH EVERY MORNING   Docusate Sodium (DSS) 100 MG CAPS   Yes No   Si mg   Multiple Vitamin (MULTIVITAMINS PO)  Self Yes No   Sig: TAKE ONE CAP/TAB BY MOUTH EVERY DAY FOR SUPPLEMENTATION   Omega-3 Fatty Acids (fish oil) 1,000 mg   Yes No   Sig: Take 1,000 mg by mouth daily   acetaminophen (TYLENOL) 500 mg tablet   Yes No   Sig: Take 500 mg by mouth every 6 (six) hours as needed for mild pain   albuterol (PROVENTIL HFA,VENTOLIN HFA) 90 mcg/act inhaler  Self Yes No   Sig: Inhale 2 puffs every 6 (six) hours as needed for wheezing   alfuzosin (UROXATRAL) 10 mg 24 hr tablet   Yes No   Sig: Take 10 mg by mouth daily   aspirin (ECOTRIN LOW STRENGTH) 81 mg EC tablet  Self Yes No   Si mg daily   atorvastatin (LIPITOR) 40 mg tablet   Yes No   Sig: TAKE 20MG (ONE-HALF TABLET) BY MOUTH EVERY DAY FOR HYPERLIPIDEMIA   budesonide-formoterol (SYMBICORT) 160-4.5 mcg/act inhaler  Self Yes No   Sig: Inhale 2 puffs 2 (two) times a day Rinse mouth after use.    chlorhexidine (PERIDEX) 0.12 % solution  Self No No   Sig: Apply 15 mL to the mouth or throat 4 (four) times a day (with meals and at bedtime) After meals and before bed   ferrous gluconate (FERGON) 324 mg tablet  Self Yes No   Sig: Take 324 mg by mouth daily with breakfast   ferrous sulfate 325 (65 Fe) mg tablet Yes No   Si mg   finasteride (PROSCAR) 5 mg tablet  Self Yes No   Sig: Take 5 mg by mouth daily   fluticasone (FLONASE) 50 mcg/act nasal spray  Self Yes No   Sig: INSTILL 2 SPRAYS IN NOSTRIL(S) EVERY DAY AS NEEDED FOR NASAL CONGESTION   ketoconazole (NIZORAL) 2 % cream  Self Yes No   lactulose 20 g/30 mL   No No   Sig: Take 30 mL (20 g total) by mouth daily for 3 days   latanoprost (XALATAN) 0.005 % ophthalmic solution  Self Yes No   Sig: INSTILL 1 DROP IN BOTH EYES AT BEDTIME FOR GLAUCOMA-PROTECT BOTTLE FROM LIGHT. lisinopril (ZESTRIL) 5 mg tablet  Self Yes No   Sig: Take 5 mg by mouth   metFORMIN (GLUCOPHAGE) 500 mg tablet  Self Yes No   Sig: Take 500 mg by mouth daily with breakfast   omeprazole (PriLOSEC) 20 mg delayed release capsule  Self Yes No   Si mg   oxyCODONE-acetaminophen (PERCOCET) 5-325 mg per tablet   Yes No   Sig: TAKE 1 TABLET BY MOUTH EVERY 6 HOURS AS NEEDED FOR PAIN. RX MUST LAST UNTIL 2023. Facility-Administered Medications: None       Past Medical History:   Diagnosis Date   • BPH (benign prostatic hyperplasia)    • Diabetes mellitus ([address])    • GERD (gastroesophageal reflux disease)    • Hypertension        Past Surgical History:   Procedure Laterality Date   • COLONOSCOPY     • HERNIA REPAIR     • SUBMANDIBULAR DUCT STONE EXCISION N/A 2023    Procedure: PAROTID SIALOENDOSCOPY WITH WASHOUT;  Surgeon: Christi Romero MD;  [facility];  Service: ENT   • TONSILLECTOMY     • UPPER GASTROINTESTINAL ENDOSCOPY         Family History   Problem Relation Age of Onset   • Lupus Mother    • No Known Problems Father    • Crohn's disease Sister      I have reviewed and agree with the history as documented.     E-Cigarette/Vaping   • E-Cigarette Use Never User      E-Cigarette/Vaping Substances   • Nicotine No      Social History     Tobacco Use   • Smoking status: Former     Packs/day: 0.25     Years: 10.00     Total pack years: 2.50     Types: Cigarettes     Quit date: 3/5/1990     Years since quittin.6   • Smokeless tobacco: Never   Vaping Use   • Vaping Use: Never used   Substance Use Topics   • Alcohol use: Not Currently   • Drug use: Not Currently       Review of Systems   Skin: Positive for wound. Physical Exam  Physical Exam  Vitals and nursing note reviewed. Constitutional:       General: He is not in acute distress. Appearance: Normal appearance. He is well-developed. He is not ill-appearing, toxic-appearing or diaphoretic. HENT:      Head: Normocephalic and atraumatic. Eyes:      General:         Right eye: No discharge. Left eye: No discharge. Conjunctiva/sclera: Conjunctivae normal.      Pupils: Pupils are equal, round, and reactive to light. Neck:      Vascular: No JVD. Pulmonary:      Breath sounds: No stridor. Musculoskeletal:         General: No swelling, deformity or signs of injury. Normal range of motion. Cervical back: Normal range of motion and neck supple. Comments: No obvious FB. No evidence of infection. No drainage. Skin:     General: Skin is warm and dry. Capillary Refill: Capillary refill takes less than 2 seconds. Coloration: Skin is not pale. Findings: No erythema or rash. Neurological:      Mental Status: He is alert and oriented to person, place, and time. Cranial Nerves: No cranial nerve deficit. Sensory: No sensory deficit. Motor: No weakness or abnormal muscle tone.       Coordination: Coordination normal.         Vital Signs  ED Triage Vitals [10/03/23 1532]   Temperature Pulse Respirations Blood Pressure SpO2   (!) 97.4 °F (36.3 °C) 85 18 164/67 100 %      Temp src Heart Rate Source Patient Position - Orthostatic VS BP Location FiO2 (%)   -- -- -- -- --      Pain Score       --           Vitals:    10/03/23 1532   BP: 164/67   Pulse: 85         Visual Acuity      ED Medications  Medications - No data to display    Diagnostic Studies  Results Reviewed     None XR foot 3+ vw left   ED Interpretation by Emiliana Evans MD (10/03 1808)   Normal study. Final Result by Evan Esteban MD (10/04 0600)      No acute osseous abnormality. Workstation performed: [id number]         [facility] guidance    (Results Pending)              Procedures  Procedures         ED Course               Identification of Seniors at [facility] Most Recent Value   (ISAR) Identification of Seniors at Risk    Before the illness or injury that brought you to the Emergency, did you need someone to help you on a regular basis? 0 Filed at: 10/03/2023 1533   In the last 24 hours, have you needed more help than usual? 0 Filed at: 10/03/2023 1533   Have you been hospitalized for one or more nights during the past 6 months? 0 Filed at: 10/03/2023 1533   In general, do you see well? 0 Filed at: 10/03/2023 1533   In general, do you have serious problems with your memory? 0 Filed at: 10/03/2023 1533   Do you take more than three different medications every day? 1 Filed at: 10/03/2023 1533   ISAR Score 1 Filed at: 10/03/2023 1533                      SBIRT 22yo+    Flowsheet Row Most Recent Value   Initial Alcohol Screen: US AUDIT-C     1. How often do you have a drink containing alcohol? 0 Filed at: 10/03/2023 1534   2. How many drinks containing alcohol do you have on a typical day you are drinking? 0 Filed at: 10/03/2023 1534   3a. Male UNDER 65: How often do you have five or more drinks on one occasion? 0 Filed at: 10/03/2023 1534   3b. FEMALE Any Age, or MALE 65+: How often do you have 4 or more drinks on one occassion? 0 Filed at: 10/03/2023 1534   Audit-C Score 0 Filed at: 10/03/2023 1534   RENZO: How many times in the past year have you. .. Used an illegal drug or used a prescription medication for non-medical reasons?  Never Filed at: 10/03/2023 1534                    MDM    Disposition  Final diagnoses:   Foreign body (FB) in soft tissue     Time reflects when diagnosis was documented in both MDM as applicable and the Disposition within this note     Time User Action Codes Description Comment    10/3/2023  5:59 PM Stella Maria Victoria Add [M79.5] Foreign body (FB) in soft tissue       ED Disposition     ED Disposition   Discharge    Condition   Stable    Date/Time   Tue Oct 3, 2023 [id number] [address] discharge to home/self care.                Follow-up Information     Follow up With Specialties Details Why Contact Info Additional Information    [facility] Emergency Department Emergency Medicine   [address] [facility] Emergency Department, [place], [place], [address]          Discharge Medication List as of 10/3/2023  6:03 PM      CONTINUE these medications which have NOT CHANGED    Details   acetaminophen (TYLENOL) 500 mg tablet Take 500 mg by mouth every 6 (six) hours as needed for mild pain, Historical Med      albuterol (PROVENTIL HFA,VENTOLIN HFA) 90 mcg/act inhaler Inhale 2 puffs every 6 (six) hours as needed for wheezing, Historical Med      alfuzosin (UROXATRAL) 10 mg 24 hr tablet Take 10 mg by mouth daily, Historical Med      aspirin (ECOTRIN LOW STRENGTH) 81 mg EC tablet 81 mg daily, Starting Thu 1/20/2022, Historical Med      atorvastatin (LIPITOR) 40 mg tablet TAKE 20MG (ONE-HALF TABLET) BY MOUTH EVERY DAY FOR HYPERLIPIDEMIA, Historical Med      budesonide-formoterol (SYMBICORT) 160-4.5 mcg/act inhaler Inhale 2 puffs 2 (two) times a day Rinse mouth after use., Historical Med      CETIRIZINE HCL PO Take 1 tablet by mouth in the morning, Historical Med      chlorhexidine (PERIDEX) 0.12 % solution Apply 15 mL to the mouth or throat 4 (four) times a day (with meals and at bedtime) After meals and before bed, Starting Sat 2/19/2022, Print      Cholecalciferol 50 MCG (2000 UT) TABS TAKE ONE TABLET BY MOUTH EVERY DAY (FOR LOW VITAMIN D), Historical Med      Coenzyme Q10 100 MG TABS TAKE 400MG  BY MOUTH EVERY MORNING, Historical Med      Docusate Sodium (DSS) 100 MG CAPS 100 mg, Starting Fri 6/9/2023, Historical Med      ferrous gluconate (FERGON) 324 mg tablet Take 324 mg by mouth daily with breakfast, Historical Med      ferrous sulfate 325 (65 Fe) mg tablet 325 mg, Starting Wed 5/31/2023, Historical Med      finasteride (PROSCAR) 5 mg tablet Take 5 mg by mouth daily, Starting Thu 1/6/2022, Historical Med      fluticasone (FLONASE) 50 mcg/act nasal spray INSTILL 2 SPRAYS IN NOSTRIL(S) EVERY DAY AS NEEDED FOR NASAL CONGESTION, Historical Med      ketoconazole (NIZORAL) 2 % cream Historical Med      lactulose 20 g/30 mL Take 30 mL (20 g total) by mouth daily for 3 days, Starting Sat 8/12/2023, Until Tue 8/15/2023, Normal      latanoprost (XALATAN) 0.005 % ophthalmic solution INSTILL 1 DROP IN BOTH EYES AT BEDTIME FOR GLAUCOMA-PROTECT BOTTLE FROM LIGHT., Historical Med      lisinopril (ZESTRIL) 5 mg tablet Take 5 mg by mouth, Starting Thu 1/6/2022, Historical Med      metFORMIN (GLUCOPHAGE) 500 mg tablet Take 500 mg by mouth daily with breakfast, Starting Thu 1/6/2022, Historical Med      Multiple Vitamin (MULTIVITAMINS PO) TAKE ONE CAP/TAB BY MOUTH EVERY DAY FOR SUPPLEMENTATION, Historical Med      Omega-3 Fatty Acids (fish oil) 1,000 mg Take 1,000 mg by mouth daily, Historical Med      omeprazole (PriLOSEC) 20 mg delayed release capsule 20 mg, Starting Mon 4/11/2022, Historical Med      oxyCODONE-acetaminophen (PERCOCET) 5-325 mg per tablet TAKE 1 TABLET BY MOUTH EVERY 6 HOURS AS NEEDED FOR PAIN. RX MUST LAST UNTIL 6/17/2023., Historical Med             Outpatient Discharge Orders   US msk guidance   Standing Status: Future Standing Exp.  Date: 10/03/27       PDMP Review     None          ED Provider  Electronically Signed by           Juan Antonio Hogan MD  10/07/23 0074

## 2023-12-09 ENCOUNTER — HOSPITAL ENCOUNTER (EMERGENCY)
Facility: HOSPITAL | Age: 78
Discharge: HOME | End: 2023-12-09
Attending: EMERGENCY MEDICINE
Payer: COMMERCIAL

## 2023-12-09 ENCOUNTER — APPOINTMENT (EMERGENCY)
Dept: RADIOLOGY | Facility: HOSPITAL | Age: 78
End: 2023-12-09
Payer: COMMERCIAL

## 2023-12-09 VITALS
RESPIRATION RATE: 18 BRPM | HEIGHT: 69 IN | WEIGHT: 186 LBS | SYSTOLIC BLOOD PRESSURE: 154 MMHG | HEART RATE: 95 BPM | TEMPERATURE: 97.7 F | DIASTOLIC BLOOD PRESSURE: 78 MMHG | OXYGEN SATURATION: 97 % | BODY MASS INDEX: 27.55 KG/M2

## 2023-12-09 DIAGNOSIS — S52.91XA RIGHT FOREARM FRACTURE: Primary | ICD-10-CM

## 2023-12-09 PROCEDURE — 99283 EMERGENCY DEPT VISIT LOW MDM: CPT

## 2023-12-09 PROCEDURE — 73110 X-RAY EXAM OF WRIST: CPT

## 2023-12-09 PROCEDURE — 99284 EMERGENCY DEPT VISIT MOD MDM: CPT | Performed by: EMERGENCY MEDICINE

## 2023-12-09 PROCEDURE — 29125 APPL SHORT ARM SPLINT STATIC: CPT | Performed by: EMERGENCY MEDICINE

## 2023-12-09 PROCEDURE — 73090 X-RAY EXAM OF FOREARM: CPT

## 2023-12-09 RX ORDER — IBUPROFEN 800 MG/1
800 TABLET ORAL 3 TIMES DAILY
Qty: 21 TABLET | Refills: 0 | Status: SHIPPED | OUTPATIENT
Start: 2023-12-09

## 2023-12-14 ENCOUNTER — HOSPITAL ENCOUNTER (EMERGENCY)
Facility: HOSPITAL | Age: 78
Discharge: HOME | End: 2023-12-14
Attending: EMERGENCY MEDICINE | Admitting: EMERGENCY MEDICINE
Payer: COMMERCIAL

## 2023-12-14 VITALS
DIASTOLIC BLOOD PRESSURE: 90 MMHG | TEMPERATURE: 97.3 F | RESPIRATION RATE: 18 BRPM | HEART RATE: 89 BPM | SYSTOLIC BLOOD PRESSURE: 180 MMHG | OXYGEN SATURATION: 99 %

## 2023-12-14 DIAGNOSIS — M79.601 RIGHT ARM PAIN: Primary | ICD-10-CM

## 2023-12-14 DIAGNOSIS — M25.531 RIGHT WRIST PAIN: ICD-10-CM

## 2023-12-14 PROCEDURE — 99282 EMERGENCY DEPT VISIT SF MDM: CPT

## 2023-12-14 PROCEDURE — 99284 EMERGENCY DEPT VISIT MOD MDM: CPT | Performed by: EMERGENCY MEDICINE

## 2023-12-14 PROCEDURE — 29130 APPL FINGER SPLINT STATIC: CPT | Performed by: EMERGENCY MEDICINE

## 2023-12-14 NOTE — ED ATTENDING ATTESTATION
12/14/2023  I, Chani Brewer DO, saw and evaluated the patient. I have discussed the patient with the resident/non-physician practitioner and agree with the resident's/non-physician practitioner's findings, Plan of Care, and MDM as documented in the resident's/non-physician practitioner's note, except where noted. All available labs and Radiology studies were reviewed.  I was present for key portions of any procedure(s) performed by the resident/non-physician practitioner and I was immediately available to provide assistance.       At this point I agree with the current assessment done in the Emergency Department.  I have conducted an independent evaluation of this patient a history and physical is as follows:    78 y.o. M presents after an injury on 12/9 - R Radius. Also w scaphoid pain.   He took off his splint because it was itchy - needs it replaced.   Forearm has been read as normal, however patient was having scaphoid tenderness and will be put in a thumb spica and advised follow-up with orthopedics.  Stressed the importance for follow-up with orthopedics and patient understands.    ED Course         Critical Care Time  Procedures    R Thumb spica splint applied by Kaycee Larson, see procedure note.

## 2023-12-14 NOTE — ED PROVIDER NOTES
History  Chief Complaint   Patient presents with    Arm Pain     R arm pain, took splint off because he had an itch and needs it put back on      77 yo male presenting after removing his arm splint. He was seen on  after a wrist injury. He was lifting a mattress and when pulling he felt a sharp pain along the radial aspect of his right wrist/arm. He reports he removed his splint because he was itchy and needed relief. [facility]. He denies any injury since his last evaluation. He reports the pain and swelling have been improving. He has been taking ibuprofen as needed.       History provided by:  Patient   used: No    Arm Pain  Associated symptoms: no congestion, no cough, no fever, no headaches and no shortness of breath        Prior to Admission Medications   Prescriptions Last Dose Informant Patient Reported? Taking?   CETIRIZINE HCL PO  Self Yes No   Sig: Take 1 tablet by mouth in the morning   Cholecalciferol 50 MCG (2000 UT) TABS  Self Yes No   Sig: TAKE ONE TABLET BY MOUTH EVERY DAY (FOR LOW VITAMIN D)   Coenzyme Q10 100 MG TABS  Self Yes No   Sig: TAKE 400MG  BY MOUTH EVERY MORNING   Docusate Sodium (DSS) 100 MG CAPS   Yes No   Si mg   Multiple Vitamin (MULTIVITAMINS PO)  Self Yes No   Sig: TAKE ONE CAP/TAB BY MOUTH EVERY DAY FOR SUPPLEMENTATION   Omega-3 Fatty Acids (fish oil) 1,000 mg   Yes No   Sig: Take 1,000 mg by mouth daily   acetaminophen (TYLENOL) 500 mg tablet   Yes No   Sig: Take 500 mg by mouth every 6 (six) hours as needed for mild pain   albuterol (PROVENTIL HFA,VENTOLIN HFA) 90 mcg/act inhaler  Self Yes No   Sig: Inhale 2 puffs every 6 (six) hours as needed for wheezing   alfuzosin (UROXATRAL) 10 mg 24 hr tablet   Yes No   Sig: Take 10 mg by mouth daily   aspirin (ECOTRIN LOW STRENGTH) 81 mg EC tablet  Self Yes No   Si mg daily   atorvastatin (LIPITOR) 40 mg tablet   Yes No   Sig: TAKE 20MG (ONE-HALF TABLET) BY MOUTH  EVERY DAY FOR HYPERLIPIDEMIA   budesonide-formoterol (SYMBICORT) 160-4.5 mcg/act inhaler  Self Yes No   Sig: Inhale 2 puffs 2 (two) times a day Rinse mouth after use.   chlorhexidine (PERIDEX) 0.12 % solution  Self No No   Sig: Apply 15 mL to the mouth or throat 4 (four) times a day (with meals and at bedtime) After meals and before bed   ferrous gluconate (FERGON) 324 mg tablet  Self Yes No   Sig: Take 324 mg by mouth daily with breakfast   ferrous sulfate 325 (65 Fe) mg tablet   Yes No   Si mg   finasteride (PROSCAR) 5 mg tablet  Self Yes No   Sig: Take 5 mg by mouth daily   fluticasone (FLONASE) 50 mcg/act nasal spray  Self Yes No   Sig: INSTILL 2 SPRAYS IN NOSTRIL(S) EVERY DAY AS NEEDED FOR NASAL CONGESTION   ibuprofen (MOTRIN) 800 mg tablet   No No   Sig: Take 1 tablet (800 mg total) by mouth 3 (three) times a day   ketoconazole (NIZORAL) 2 % cream  Self Yes No   lactulose 20 g/30 mL   No No   Sig: Take 30 mL (20 g total) by mouth daily for 3 days   latanoprost (XALATAN) 0.005 % ophthalmic solution  Self Yes No   Sig: INSTILL 1 DROP IN BOTH EYES AT BEDTIME FOR GLAUCOMA-PROTECT BOTTLE FROM LIGHT.   lisinopril (ZESTRIL) 5 mg tablet  Self Yes No   Sig: Take 5 mg by mouth   metFORMIN (GLUCOPHAGE) 500 mg tablet  Self Yes No   Sig: Take 500 mg by mouth daily with breakfast   omeprazole (PriLOSEC) 20 mg delayed release capsule  Self Yes No   Si mg   oxyCODONE-acetaminophen (PERCOCET) 5-325 mg per tablet   Yes No   Sig: TAKE 1 TABLET BY MOUTH EVERY 6 HOURS AS NEEDED FOR PAIN. RX MUST LAST UNTIL 2023.      Facility-Administered Medications: None       Past Medical History:   Diagnosis Date    BPH (benign prostatic hyperplasia)     Diabetes mellitus (HCC)     GERD (gastroesophageal reflux disease)     Hypertension        Past Surgical History:   Procedure Laterality Date    COLONOSCOPY      HERNIA REPAIR      SUBMANDIBULAR DUCT STONE EXCISION N/A 2023    Procedure: PAROTID SIALOENDOSCOPY WITH  WASHOUT;  Surgeon: Angel Dasilva MD;  Location[facility];  Service: ENT    TONSILLECTOMY      UPPER GASTROINTESTINAL ENDOSCOPY         Family History   Problem Relation Age of Onset    Lupus Mother     No Known Problems Father     Crohn's disease Sister      I have reviewed and agree with the history as documented.    E-Cigarette/Vaping    E-Cigarette Use Never User      E-Cigarette/Vaping Substances    Nicotine No      Social History     Tobacco Use    Smoking status: Former     Current packs/day: 0.00     Average packs/day: 0.3 packs/day for 10.0 years (2.5 ttl pk-yrs)     Types: Cigarettes     Start date: 3/5/1980     Quit date: 3/5/1990     Years since quittin.8    Smokeless tobacco: Never   Vaping Use    Vaping status: Never Used   Substance Use Topics    Alcohol use: Not Currently    Drug use: Not Currently       Review of Systems   Constitutional:  Negative for appetite change, chills and fever.   HENT:  Negative for congestion.    Respiratory:  Negative for cough and shortness of breath.    Musculoskeletal:         Right arm and wrist pain   Skin:  Negative for color change and pallor.   Neurological:  Negative for light-headedness and headaches.   Hematological:  Does not bruise/bleed easily.   All other systems reviewed and are negative.      Physical Exam  Physical Exam  Vitals and nursing note reviewed.   Constitutional:       Appearance: Normal appearance.   HENT:      Head: Normocephalic and atraumatic.      Right Ear: External ear normal.      Left Ear: External ear normal.      Nose: Nose normal.      Mouth/Throat:      Mouth: Mucous membranes are moist.      Pharynx: Oropharynx is clear.   Eyes:      Extraocular Movements: Extraocular movements intact.      Conjunctiva/sclera: Conjunctivae normal.   Musculoskeletal:         General: Normal range of motion.      Right elbow: Normal. No swelling. Normal range of motion. No tenderness.      Left elbow: Normal. No swelling. Normal range of  motion. No tenderness.      Right forearm: Edema (Mild swelling over distal radius) present. No tenderness.      Left forearm: No edema or tenderness.      Right wrist: Swelling, tenderness and snuff box tenderness present. Normal range of motion. Normal pulse.      Left wrist: Normal range of motion. Normal pulse.      Right hand: Normal.      Left hand: Normal.      Cervical back: Normal range of motion.   Skin:     General: Skin is warm and dry.      Capillary Refill: Capillary refill takes less than 2 seconds.   Neurological:      General: No focal deficit present.      Mental Status: He is alert. Mental status is at baseline.   Psychiatric:         Mood and Affect: Mood normal.         Behavior: Behavior normal.         Vital Signs  ED Triage Vitals [12/14/23 1348]   Temperature Pulse Respirations Blood Pressure SpO2   (!) 97.3 °F (36.3 °C) 89 18 (!) 180/90 99 %      Temp Source Heart Rate Source Patient Position - Orthostatic VS BP Location FiO2 (%)   Temporal Monitor Sitting Left arm --      Pain Score       2           Vitals:    12/14/23 1348   BP: (!) 180/90   Pulse: 89   Patient Position - Orthostatic VS: Sitting         Visual Acuity      ED Medications  Medications - No data to display    Diagnostic Studies  Results Reviewed       None                   No orders to display              Procedures  Splint application    Date/Time: 12/14/2023 3:52 PM    Performed by: Kaycee Tilley PA-C  Authorized by: Kaycee Tilley PA-C  [place] Protocol:  Consent: Verbal consent obtained.  Risks and benefits: risks, benefits and alternatives were discussed  Patient identity confirmed: verbally with patient and arm band    Pre-procedure details:     Sensation:  Normal    Skin color:  Normal  Procedure details:     Laterality:  Right    Location:  Wrist    Wrist:  R wrist    Splint type:  Thumb spica    Supplies:  Cotton padding, 2 layer wrap and Ortho-Glass  Post-procedure details:     Pain:  Unchanged     Sensation:  Normal    Skin color:  Normal, unchanged    Patient tolerance of procedure:  Tolerated well, no immediate complications           ED Course                               SBIRT 22yo+      Flowsheet Row Most Recent Value   Initial Alcohol Screen: US AUDIT-C     1. How often do you have a drink containing alcohol? 0 Filed at: 12/14/2023 1350   2. How many drinks containing alcohol do you have on a typical day you are drinking?  0 Filed at: 12/14/2023 1350   3a. Male UNDER 65: How often do you have five or more drinks on one occasion? 0 Filed at: 12/14/2023 1350   3b. FEMALE Any Age, or MALE 65+: How often do you have 4 or more drinks on one occassion? 0 Filed at: 12/14/2023 1350   Audit-C Score 0 Filed at: 12/14/2023 1350   RENZO: How many times in the past year have you...    Used an illegal drug or used a prescription medication for non-medical reasons? Never Filed at: 12/14/2023 1350                      Medical Decision Making  77 yo male presenting after removing his right arm splint. He reports he was itchy so he removed the splint today. He denies any injury since splint was placed. His pain and swelling have been improving. His xray from 12/9 revealed no acute fractures. Patient with snuffbox tenderness so placed in thumb spica and given instructions to follow-up with outpatient orthopedics for repeat imaging. He reports that [facility] Return precautions discussed. Patient is in agreement and understanding with this plan.              Disposition  Final diagnoses:   Right arm pain   Right wrist pain     Time reflects when diagnosis was documented in both MDM as applicable and the Disposition within this note       Time User Action Codes Description Comment    12/14/2023  3:29 PM Kaycee Tilley [M79.601] Right arm pain     12/14/2023  3:29 PM Kaycee Tilley [M25.531] Right wrist pain           ED Disposition       ED Disposition   Discharge     Condition   Stable    Date/Time   Thu Dec 14, 2023 1529    Comment   Altaf Bertrand discharge to home/self care.                   Follow-up Information       Follow up With Specialties Details Why Contact Info Additional Information    [facility] [place] [facility] Schedule an appointment as soon as possible for a visit   [address]  [facility] [address] [phone] [facility] [place], [address] [place] [address] [address], [place], [place], [address] [phone]            Discharge Medication List as of 12/14/2023  3:30 PM        CONTINUE these medications which have NOT CHANGED    Details   acetaminophen (TYLENOL) 500 mg tablet Take 500 mg by mouth every 6 (six) hours as needed for mild pain, Historical Med      albuterol (PROVENTIL HFA,VENTOLIN HFA) 90 mcg/act inhaler Inhale 2 puffs every 6 (six) hours as needed for wheezing, Historical Med      alfuzosin (UROXATRAL) 10 mg 24 hr tablet Take 10 mg by mouth daily, Historical Med      aspirin (ECOTRIN LOW STRENGTH) 81 mg EC tablet 81 mg daily, Starting Thu 1/20/2022, Historical Med      atorvastatin (LIPITOR) 40 mg tablet TAKE 20MG (ONE-HALF TABLET) BY MOUTH EVERY DAY FOR HYPERLIPIDEMIA, Historical Med      budesonide-formoterol (SYMBICORT) 160-4.5 mcg/act inhaler Inhale 2 puffs 2 (two) times a day Rinse mouth after use., Historical Med      CETIRIZINE HCL PO Take 1 tablet by mouth in the morning, Historical Med      chlorhexidine (PERIDEX) 0.12 % solution Apply 15 mL to the mouth or throat 4 (four) times a day (with meals and at bedtime) After meals and before bed, Starting Sat 2/19/2022, Print      Cholecalciferol 50 MCG (2000 UT) TABS TAKE ONE TABLET BY MOUTH EVERY DAY (FOR LOW VITAMIN D), Historical Med      Coenzyme Q10 100 MG TABS TAKE 400MG  BY MOUTH EVERY MORNING, Historical Med      Docusate Sodium (DSS) 100 MG CAPS 100 mg, Starting Fri 6/9/2023,  Historical Med      ferrous gluconate (FERGON) 324 mg tablet Take 324 mg by mouth daily with breakfast, Historical Med      ferrous sulfate 325 (65 Fe) mg tablet 325 mg, Starting Wed 5/31/2023, Historical Med      finasteride (PROSCAR) 5 mg tablet Take 5 mg by mouth daily, Starting Thu 1/6/2022, Historical Med      fluticasone (FLONASE) 50 mcg/act nasal spray INSTILL 2 SPRAYS IN NOSTRIL(S) EVERY DAY AS NEEDED FOR NASAL CONGESTION, Historical Med      ibuprofen (MOTRIN) 800 mg tablet Take 1 tablet (800 mg total) by mouth 3 (three) times a day, Starting Sat 12/9/2023, Normal      ketoconazole (NIZORAL) 2 % cream Historical Med      lactulose 20 g/30 mL Take 30 mL (20 g total) by mouth daily for 3 days, Starting Sat 8/12/2023, Until Tue 8/15/2023, Normal      latanoprost (XALATAN) 0.005 % ophthalmic solution INSTILL 1 DROP IN BOTH EYES AT BEDTIME FOR GLAUCOMA-PROTECT BOTTLE FROM LIGHT., Historical Med      lisinopril (ZESTRIL) 5 mg tablet Take 5 mg by mouth, Starting Thu 1/6/2022, Historical Med      metFORMIN (GLUCOPHAGE) 500 mg tablet Take 500 mg by mouth daily with breakfast, Starting Thu 1/6/2022, Historical Med      Multiple Vitamin (MULTIVITAMINS PO) TAKE ONE CAP/TAB BY MOUTH EVERY DAY FOR SUPPLEMENTATION, Historical Med      Omega-3 Fatty Acids (fish oil) 1,000 mg Take 1,000 mg by mouth daily, Historical Med      omeprazole (PriLOSEC) 20 mg delayed release capsule 20 mg, Starting Mon 4/11/2022, Historical Med      oxyCODONE-acetaminophen (PERCOCET) 5-325 mg per tablet TAKE 1 TABLET BY MOUTH EVERY 6 HOURS AS NEEDED FOR PAIN. RX MUST LAST UNTIL 6/17/2023., Historical Med             No discharge procedures on file.    PDMP Review       None            ED Provider  Electronically Signed by             Kaycee Tilley PA-C  12/14/23 8635

## 2023-12-22 NOTE — ED PROVIDER NOTES
History  Chief Complaint   Patient presents with    Arm Injury     Pt was trying to move a bed with a mattress on it and felt something weird in his right wrist. Pt ignored it but last night the pts forearm swelled up and now makes a clicking sound when he moves it     69-year-old male presents emergency department for evaluation of arm pain.  Patient was moving a mattress, felt something pull his right wrist, has a area of swelling and tenderness in the right forearm.  Still has full range of motion without numbness weakness or tingling, but not to make sure that nothing was injured.        Prior to Admission Medications   Prescriptions Last Dose Informant Patient Reported? Taking?   CETIRIZINE HCL PO  Self Yes No   Sig: Take 1 tablet by mouth in the morning   Cholecalciferol 50 MCG (2000 UT) TABS  Self Yes No   Sig: TAKE ONE TABLET BY MOUTH EVERY DAY (FOR LOW VITAMIN D)   Coenzyme Q10 100 MG TABS  Self Yes No   Sig: TAKE 400MG  BY MOUTH EVERY MORNING   Docusate Sodium (DSS) 100 MG CAPS   Yes No   Si mg   Multiple Vitamin (MULTIVITAMINS PO)  Self Yes No   Sig: TAKE ONE CAP/TAB BY MOUTH EVERY DAY FOR SUPPLEMENTATION   Omega-3 Fatty Acids (fish oil) 1,000 mg   Yes No   Sig: Take 1,000 mg by mouth daily   acetaminophen (TYLENOL) 500 mg tablet   Yes No   Sig: Take 500 mg by mouth every 6 (six) hours as needed for mild pain   albuterol (PROVENTIL HFA,VENTOLIN HFA) 90 mcg/act inhaler  Self Yes No   Sig: Inhale 2 puffs every 6 (six) hours as needed for wheezing   alfuzosin (UROXATRAL) 10 mg 24 hr tablet   Yes No   Sig: Take 10 mg by mouth daily   aspirin (ECOTRIN LOW STRENGTH) 81 mg EC tablet  Self Yes No   Si mg daily   atorvastatin (LIPITOR) 40 mg tablet   Yes No   Sig: TAKE 20MG (ONE-HALF TABLET) BY MOUTH EVERY DAY FOR HYPERLIPIDEMIA   budesonide-formoterol (SYMBICORT) 160-4.5 mcg/act inhaler  Self Yes No   Sig: Inhale 2 puffs 2 (two) times a day Rinse mouth after use.   chlorhexidine (PERIDEX) 0.12 %  solution  Self No No   Sig: Apply 15 mL to the mouth or throat 4 (four) times a day (with meals and at bedtime) After meals and before bed   ferrous gluconate (FERGON) 324 mg tablet  Self Yes No   Sig: Take 324 mg by mouth daily with breakfast   ferrous sulfate 325 (65 Fe) mg tablet   Yes No   Si mg   finasteride (PROSCAR) 5 mg tablet  Self Yes No   Sig: Take 5 mg by mouth daily   fluticasone (FLONASE) 50 mcg/act nasal spray  Self Yes No   Sig: INSTILL 2 SPRAYS IN NOSTRIL(S) EVERY DAY AS NEEDED FOR NASAL CONGESTION   ketoconazole (NIZORAL) 2 % cream  Self Yes No   lactulose 20 g/30 mL   No No   Sig: Take 30 mL (20 g total) by mouth daily for 3 days   latanoprost (XALATAN) 0.005 % ophthalmic solution  Self Yes No   Sig: INSTILL 1 DROP IN BOTH EYES AT BEDTIME FOR GLAUCOMA-PROTECT BOTTLE FROM LIGHT.   lisinopril (ZESTRIL) 5 mg tablet  Self Yes No   Sig: Take 5 mg by mouth   metFORMIN (GLUCOPHAGE) 500 mg tablet  Self Yes No   Sig: Take 500 mg by mouth daily with breakfast   omeprazole (PriLOSEC) 20 mg delayed release capsule  Self Yes No   Si mg   oxyCODONE-acetaminophen (PERCOCET) 5-325 mg per tablet   Yes No   Sig: TAKE 1 TABLET BY MOUTH EVERY 6 HOURS AS NEEDED FOR PAIN. RX MUST LAST UNTIL 2023.      Facility-Administered Medications: None       Past Medical History:   Diagnosis Date    BPH (benign prostatic hyperplasia)     Diabetes mellitus (HCC)     GERD (gastroesophageal reflux disease)     Hypertension        Past Surgical History:   Procedure Laterality Date    COLONOSCOPY      HERNIA REPAIR      SUBMANDIBULAR DUCT STONE EXCISION N/A 2023    Procedure: PAROTID SIALOENDOSCOPY WITH WASHOUT;  Surgeon: Angel Dasilva MD;  Location: [facility];  Service: ENT    TONSILLECTOMY      UPPER GASTROINTESTINAL ENDOSCOPY         Family History   Problem Relation Age of Onset    Lupus Mother     No Known Problems Father     Crohn's disease Sister      I have reviewed and agree with the history as  documented.    E-Cigarette/Vaping    E-Cigarette Use Never User      E-Cigarette/Vaping Substances    Nicotine No      Social History     Tobacco Use    Smoking status: Former     Current packs/day: 0.00     Average packs/day: 0.3 packs/day for 10.0 years (2.5 ttl pk-yrs)     Types: Cigarettes     Start date: 3/5/1980     Quit date: 3/5/1990     Years since quittin.8    Smokeless tobacco: Never   Vaping Use    Vaping status: Never Used   Substance Use Topics    Alcohol use: Not Currently    Drug use: Not Currently       Review of Systems   Constitutional:  Negative for appetite change, chills, fatigue and fever.   HENT:  Negative for sneezing and sore throat.    Eyes:  Negative for visual disturbance.   Respiratory:  Negative for cough, choking, chest tightness, shortness of breath and wheezing.    Cardiovascular:  Negative for chest pain and palpitations.   Gastrointestinal:  Negative for abdominal pain, constipation, diarrhea, nausea and vomiting.   Genitourinary:  Negative for difficulty urinating and dysuria.   Musculoskeletal:  Positive for arthralgias.   Neurological:  Negative for dizziness, weakness, light-headedness, numbness and headaches.   All other systems reviewed and are negative.      Physical Exam  Physical Exam  Vitals and nursing note reviewed.   Constitutional:       General: He is not in acute distress.     Appearance: He is well-developed. He is not diaphoretic.   HENT:      Head: Normocephalic and atraumatic.   Eyes:      Pupils: Pupils are equal, round, and reactive to light.   Neck:      Vascular: No JVD.      Trachea: No tracheal deviation.   Cardiovascular:      Rate and Rhythm: Normal rate and regular rhythm.      Heart sounds: Normal heart sounds. No murmur heard.     No friction rub. No gallop.   Pulmonary:      Effort: Pulmonary effort is normal. No respiratory distress.      Breath sounds: Normal breath sounds. No wheezing or rales.   Abdominal:      General: Bowel sounds are  normal. There is no distension.      Palpations: Abdomen is soft.      Tenderness: There is no abdominal tenderness. There is no guarding or rebound.   Musculoskeletal:      Comments: Tenderness and swelling of the distal right forearm.  Full range of motion, 2+ radial pulse.  Neurovascular intact ulnar median and radial nerve distributions.   Skin:     General: Skin is warm and dry.      Coloration: Skin is not pale.   Neurological:      Mental Status: He is alert and oriented to person, place, and time.      Cranial Nerves: No cranial nerve deficit.      Motor: No abnormal muscle tone.   Psychiatric:         Behavior: Behavior normal.         Vital Signs  ED Triage Vitals [12/09/23 1548]   Temperature Pulse Respirations Blood Pressure SpO2   97.7 °F (36.5 °C) 95 18 154/78 97 %      Temp Source Heart Rate Source Patient Position - Orthostatic VS BP Location FiO2 (%)   Temporal Monitor Sitting Left arm --      Pain Score       --           Vitals:    12/09/23 1548   BP: 154/78   Pulse: 95   Patient Position - Orthostatic VS: Sitting         Visual Acuity      ED Medications  Medications - No data to display    Diagnostic Studies  Results Reviewed       None                   XR wrist 3+ views RIGHT   Final Result by Pastor Pedroza MD (12/10 1259)      No acute osseous abnormality.      Degenerative changes as described.                  Resident: REAGAN GUNTER I, the attending radiologist, have reviewed the images and agree with the final report above.      Workstation performed: [id number]         XR forearm 2 views RIGHT   Final Result by Pastor Pedroza MD (12/10 1259)      No acute osseous abnormality.      Degenerative changes as described.                  Resident: REAGAN GUNTER I, the attending radiologist, have reviewed the images and agree with the final report above.      Workstation performed: [id number]                    Procedures  Orthopedic injury treatment    Date/Time:  12/9/2023 5:30 PM    Performed by: Dilan Parada MD  Authorized by: Dilan Parada MD  Universal Protocol:  Procedure performed by:    Injury location:  Wrist  Location details:  Right wrist  Injury type:  Fracture  Fracture type: distal radius    Fracture type: distal radius    Neurovascular status: Neurovascularly intact    Distal perfusion: normal    Neurological function: normal    Range of motion: normal    Manipulation performed?: No    Immobilization:  Splint  Splint type:  Sugar tong  Neurovascular status: Neurovascularly intact    Distal perfusion: normal    Neurological function: normal    Patient tolerance:  Patient tolerated the procedure well with no immediate complications           ED Course               Identification of Seniors at Risk      Flowsheet Row Most Recent Value   (ISAR) Identification of Seniors at Risk    Before the illness or injury that brought you to the Emergency, did you need someone to help you on a regular basis? 0 Filed at: 12/09/2023 1552   In the last 24 hours, have you needed more help than usual? 0 Filed at: 12/09/2023 1552   Have you been hospitalized for one or more nights during the past 6 months? 0 Filed at: 12/09/2023 1552   In general, do you see well? 0 Filed at: 12/09/2023 1552   In general, do you have serious problems with your memory? 0 Filed at: 12/09/2023 1552   Do you take more than three different medications every day? 0 Filed at: 12/09/2023 1552   ISAR Score 0 Filed at: 12/09/2023 1552                        SBIRT 20yo+      Flowsheet Row Most Recent Value   Initial Alcohol Screen: US AUDIT-C     1. How often do you have a drink containing alcohol? 0 Filed at: 12/09/2023 1552   2. How many drinks containing alcohol do you have on a typical day you are drinking?  0 Filed at: 12/09/2023 1552   3a. Male UNDER 65: How often do you have five or more drinks on one occasion? 0 Filed at: 12/09/2023 1552   Audit-C Score 0 Filed at: 12/09/2023 1552   RENZO: How many times  in the past year have you...    Used an illegal drug or used a prescription medication for non-medical reasons? Never Filed at: 12/09/2023 1552                      Medical Decision Making  8-year-old male with right forearm injury, x-ray shows possible cortical disruption at the midshaft of the radius, will place in sugar-tong, sling, Ortho follow-up.    Amount and/or Complexity of Data Reviewed  Radiology: ordered.    Risk  Prescription drug management.             Disposition  Final diagnoses:   Right forearm fracture     Time reflects when diagnosis was documented in both MDM as applicable and the Disposition within this note       Time User Action Codes Description Comment    12/9/2023  4:32 PM Dilan Parada Add [S52.91XA] Right forearm fracture           ED Disposition       ED Disposition   Discharge    Condition   Stable    Date/Time   Sat Dec 9, 2023 1731    Comment   Altaf Thurston discharge to home/self care.                   Follow-up Information       Follow up With Specialties Details Why Contact Info Additional Information    [facility]   [address] [facility] [address]  [facility] [address]  [phone] [facility], [address] [place] [address] [address], [place], [place], [address]   [phone]            Discharge Medication List as of 12/9/2023  5:31 PM        START taking these medications    Details   ibuprofen (MOTRIN) 800 mg tablet Take 1 tablet (800 mg total) by mouth 3 (three) times a day, Starting Sat 12/9/2023, Normal           CONTINUE these medications which have NOT CHANGED    Details   acetaminophen (TYLENOL) 500 mg tablet Take 500 mg by mouth every 6 (six) hours as needed for mild pain, Historical Med      albuterol (PROVENTIL HFA,VENTOLIN HFA) 90 mcg/act inhaler Inhale 2 puffs every 6 (six) hours as needed for wheezing, Historical Med      alfuzosin (UROXATRAL) 10 mg 24 hr  tablet Take 10 mg by mouth daily, Historical Med      aspirin (ECOTRIN LOW STRENGTH) 81 mg EC tablet 81 mg daily, Starting Thu 1/20/2022, Historical Med      atorvastatin (LIPITOR) 40 mg tablet TAKE 20MG (ONE-HALF TABLET) BY MOUTH EVERY DAY FOR HYPERLIPIDEMIA, Historical Med      budesonide-formoterol (SYMBICORT) 160-4.5 mcg/act inhaler Inhale 2 puffs 2 (two) times a day Rinse mouth after use., Historical Med      CETIRIZINE HCL PO Take 1 tablet by mouth in the morning, Historical Med      chlorhexidine (PERIDEX) 0.12 % solution Apply 15 mL to the mouth or throat 4 (four) times a day (with meals and at bedtime) After meals and before bed, Starting Sat 2/19/2022, Print      Cholecalciferol 50 MCG (2000 UT) TABS TAKE ONE TABLET BY MOUTH EVERY DAY (FOR LOW VITAMIN D), Historical Med      Coenzyme Q10 100 MG TABS TAKE 400MG  BY MOUTH EVERY MORNING, Historical Med      Docusate Sodium (DSS) 100 MG CAPS 100 mg, Starting Fri 6/9/2023, Historical Med      ferrous gluconate (FERGON) 324 mg tablet Take 324 mg by mouth daily with breakfast, Historical Med      ferrous sulfate 325 (65 Fe) mg tablet 325 mg, Starting Wed 5/31/2023, Historical Med      finasteride (PROSCAR) 5 mg tablet Take 5 mg by mouth daily, Starting Thu 1/6/2022, Historical Med      fluticasone (FLONASE) 50 mcg/act nasal spray INSTILL 2 SPRAYS IN NOSTRIL(S) EVERY DAY AS NEEDED FOR NASAL CONGESTION, Historical Med      ketoconazole (NIZORAL) 2 % cream Historical Med      lactulose 20 g/30 mL Take 30 mL (20 g total) by mouth daily for 3 days, Starting Sat 8/12/2023, Until Tue 8/15/2023, Normal      latanoprost (XALATAN) 0.005 % ophthalmic solution INSTILL 1 DROP IN BOTH EYES AT BEDTIME FOR GLAUCOMA-PROTECT BOTTLE FROM LIGHT., Historical Med      lisinopril (ZESTRIL) 5 mg tablet Take 5 mg by mouth, Starting Thu 1/6/2022, Historical Med      metFORMIN (GLUCOPHAGE) 500 mg tablet Take 500 mg by mouth daily with breakfast, Starting Thu 1/6/2022, Historical Med       Multiple Vitamin (MULTIVITAMINS PO) TAKE ONE CAP/TAB BY MOUTH EVERY DAY FOR SUPPLEMENTATION, Historical Med      Omega-3 Fatty Acids (fish oil) 1,000 mg Take 1,000 mg by mouth daily, Historical Med      omeprazole (PriLOSEC) 20 mg delayed release capsule 20 mg, Starting Mon 4/11/2022, Historical Med      oxyCODONE-acetaminophen (PERCOCET) 5-325 mg per tablet TAKE 1 TABLET BY MOUTH EVERY 6 HOURS AS NEEDED FOR PAIN. RX MUST LAST UNTIL 6/17/2023., Historical Med                 PDMP Review       None            ED Provider  Electronically Signed by             Dilan Parada MD  12/22/23 7499

## 2023-12-27 ENCOUNTER — HOSPITAL ENCOUNTER (EMERGENCY)
Facility: HOSPITAL | Age: 78
Discharge: HOME | End: 2023-12-27
Attending: EMERGENCY MEDICINE
Payer: COMMERCIAL

## 2023-12-27 VITALS
HEART RATE: 98 BPM | WEIGHT: 183 LBS | OXYGEN SATURATION: 98 % | DIASTOLIC BLOOD PRESSURE: 90 MMHG | TEMPERATURE: 98 F | SYSTOLIC BLOOD PRESSURE: 155 MMHG | RESPIRATION RATE: 18 BRPM | BODY MASS INDEX: 27.02 KG/M2

## 2023-12-27 DIAGNOSIS — L25.9 CONTACT DERMATITIS: Primary | ICD-10-CM

## 2023-12-27 PROCEDURE — 99282 EMERGENCY DEPT VISIT SF MDM: CPT

## 2023-12-27 PROCEDURE — 99284 EMERGENCY DEPT VISIT MOD MDM: CPT | Performed by: EMERGENCY MEDICINE

## 2023-12-27 RX ORDER — PREDNISONE 20 MG/1
40 TABLET ORAL DAILY
Qty: 10 TABLET | Refills: 0 | Status: SHIPPED | OUTPATIENT
Start: 2023-12-27 | End: 2024-01-01

## 2023-12-27 RX ORDER — PREDNISONE 20 MG/1
60 TABLET ORAL ONCE
Status: COMPLETED | OUTPATIENT
Start: 2023-12-27 | End: 2023-12-27

## 2023-12-27 RX ADMIN — PREDNISONE 60 MG: 20 TABLET ORAL at 14:51

## 2023-12-27 NOTE — ED PROVIDER NOTES
History  Chief Complaint   Patient presents with    Rash     Pt reports a facial rash after he sprayed new cologne on xmas.      HPI patient is a 78-year-old male reports that he used a cologne  on his face.  No reported itching and redness.  He reports that he had to take a very warm shower to alleviate some of the severe itchiness that he developed.  Patient believes he is having a localized  allergic reaction.  Patient reports that he feels he has a contact dermatitis.  Reports the rash over his entire face.  Past medical history of benign prostatic hypertrophy, diabetes, hypertension  Family history noncontributory  Social history, former smoker,     Prior to Admission Medications   Prescriptions Last Dose Informant Patient Reported? Taking?   CETIRIZINE HCL PO  Self Yes No   Sig: Take 1 tablet by mouth in the morning   Cholecalciferol 50 MCG (2000 UT) TABS  Self Yes No   Sig: TAKE ONE TABLET BY MOUTH EVERY DAY (FOR LOW VITAMIN D)   Coenzyme Q10 100 MG TABS  Self Yes No   Sig: TAKE 400MG  BY MOUTH EVERY MORNING   Docusate Sodium (DSS) 100 MG CAPS   Yes No   Si mg   Multiple Vitamin (MULTIVITAMINS PO)  Self Yes No   Sig: TAKE ONE CAP/TAB BY MOUTH EVERY DAY FOR SUPPLEMENTATION   Omega-3 Fatty Acids (fish oil) 1,000 mg   Yes No   Sig: Take 1,000 mg by mouth daily   acetaminophen (TYLENOL) 500 mg tablet   Yes No   Sig: Take 500 mg by mouth every 6 (six) hours as needed for mild pain   albuterol (PROVENTIL HFA,VENTOLIN HFA) 90 mcg/act inhaler  Self Yes No   Sig: Inhale 2 puffs every 6 (six) hours as needed for wheezing   alfuzosin (UROXATRAL) 10 mg 24 hr tablet   Yes No   Sig: Take 10 mg by mouth daily   aspirin (ECOTRIN LOW STRENGTH) 81 mg EC tablet  Self Yes No   Si mg daily   atorvastatin (LIPITOR) 40 mg tablet   Yes No   Sig: TAKE 20MG (ONE-HALF TABLET) BY MOUTH EVERY DAY FOR HYPERLIPIDEMIA   budesonide-formoterol (SYMBICORT) 160-4.5 mcg/act inhaler  Self Yes No   Sig: Inhale 2 puffs 2 (two)  times a day Rinse mouth after use.   chlorhexidine (PERIDEX) 0.12 % solution  Self No No   Sig: Apply 15 mL to the mouth or throat 4 (four) times a day (with meals and at bedtime) After meals and before bed   ferrous gluconate (FERGON) 324 mg tablet  Self Yes No   Sig: Take 324 mg by mouth daily with breakfast   ferrous sulfate 325 (65 Fe) mg tablet   Yes No   Si mg   finasteride (PROSCAR) 5 mg tablet  Self Yes No   Sig: Take 5 mg by mouth daily   fluticasone (FLONASE) 50 mcg/act nasal spray  Self Yes No   Sig: INSTILL 2 SPRAYS IN NOSTRIL(S) EVERY DAY AS NEEDED FOR NASAL CONGESTION   ibuprofen (MOTRIN) 800 mg tablet   No No   Sig: Take 1 tablet (800 mg total) by mouth 3 (three) times a day   ketoconazole (NIZORAL) 2 % cream  Self Yes No   lactulose 20 g/30 mL   No No   Sig: Take 30 mL (20 g total) by mouth daily for 3 days   latanoprost (XALATAN) 0.005 % ophthalmic solution  Self Yes No   Sig: INSTILL 1 DROP IN BOTH EYES AT BEDTIME FOR GLAUCOMA-PROTECT BOTTLE FROM LIGHT.   lisinopril (ZESTRIL) 5 mg tablet  Self Yes No   Sig: Take 5 mg by mouth   metFORMIN (GLUCOPHAGE) 500 mg tablet  Self Yes No   Sig: Take 500 mg by mouth daily with breakfast   omeprazole (PriLOSEC) 20 mg delayed release capsule  Self Yes No   Si mg   oxyCODONE-acetaminophen (PERCOCET) 5-325 mg per tablet   Yes No   Sig: TAKE 1 TABLET BY MOUTH EVERY 6 HOURS AS NEEDED FOR PAIN. RX MUST LAST UNTIL 2023.      Facility-Administered Medications: None       Past Medical History:   Diagnosis Date    BPH (benign prostatic hyperplasia)     Diabetes mellitus (HCC)     GERD (gastroesophageal reflux disease)     Hypertension        Past Surgical History:   Procedure Laterality Date    COLONOSCOPY      HERNIA REPAIR      SUBMANDIBULAR DUCT STONE EXCISION N/A 2023    Procedure: PAROTID SIALOENDOSCOPY WITH WASHOUT;  Surgeon: Angel Dasilva MD;  Location: [facility];  Service: ENT    TONSILLECTOMY      UPPER GASTROINTESTINAL ENDOSCOPY          Family History   Problem Relation Age of Onset    Lupus Mother     No Known Problems Father     Crohn's disease Sister      I have reviewed and agree with the history as documented.    E-Cigarette/Vaping    E-Cigarette Use Never User      E-Cigarette/Vaping Substances    Nicotine No      Social History     Tobacco Use    Smoking status: Former     Current packs/day: 0.00     Average packs/day: 0.3 packs/day for 10.0 years (2.5 ttl pk-yrs)     Types: Cigarettes     Start date: 3/5/1980     Quit date: 3/5/1990     Years since quittin.8    Smokeless tobacco: Never   Vaping Use    Vaping status: Never Used   Substance Use Topics    Alcohol use: Not Currently    Drug use: Not Currently       Review of Systems   Constitutional:  Negative for fever.   HENT:  Negative for congestion.    Eyes:  Negative for pain and redness.   Respiratory:  Negative for cough and shortness of breath.    Cardiovascular:  Negative for chest pain.   Gastrointestinal:  Negative for abdominal pain and vomiting.   Skin:  Positive for rash.       Physical Exam  Physical Exam  Vitals and nursing note reviewed.   Constitutional:       Appearance: He is well-developed.   HENT:      Head: Normocephalic.      Right Ear: External ear normal.      Left Ear: External ear normal.      Nose: Nose normal.      Mouth/Throat:      Mouth: Mucous membranes are moist.      Pharynx: Oropharynx is clear.   Eyes:      General: Lids are normal.      Extraocular Movements: Extraocular movements intact.      Pupils: Pupils are equal, round, and reactive to light.   Pulmonary:      Effort: Pulmonary effort is normal. No respiratory distress.   Musculoskeletal:         General: No deformity. Normal range of motion.      Cervical back: Normal range of motion and neck supple.   Skin:     General: Skin is warm and dry.      Comments: Slightly raised red rash on both cheeks consistent with topical location of substance the patient is allergic to.  No sign of  cellulitis, sign of abscess.   Neurological:      Mental Status: He is alert and oriented to person, place, and time.   Psychiatric:         Mood and Affect: Mood normal.         Vital Signs  ED Triage Vitals   Temperature Pulse Respirations Blood Pressure SpO2   12/27/23 1209 12/27/23 1209 12/27/23 1209 12/27/23 1209 12/27/23 1209   97.9 °F (36.6 °C) 103 18 143/79 97 %      Temp Source Heart Rate Source Patient Position - Orthostatic VS BP Location FiO2 (%)   12/27/23 1428 12/27/23 1428 12/27/23 1428 12/27/23 1428 --   Oral Monitor Sitting Left arm       Pain Score       12/27/23 1428       4           Vitals:    12/27/23 1209 12/27/23 1428   BP: 143/79 155/90   Pulse: 103 98   Patient Position - Orthostatic VS:  Sitting         Visual Acuity      ED Medications  Medications   predniSONE tablet 60 mg (60 mg Oral Given 12/27/23 1451)       Diagnostic Studies  Results Reviewed       None                   No orders to display              Procedures  Procedures         ED Course                               SBIRT 22yo+      Flowsheet Row Most Recent Value   Initial Alcohol Screen: US AUDIT-C     1. How often do you have a drink containing alcohol? 0 Filed at: 12/27/2023 1452   2. How many drinks containing alcohol do you have on a typical day you are drinking?  0 Filed at: 12/27/2023 1452   3b. FEMALE Any Age, or MALE 65+: How often do you have 4 or more drinks on one occassion? 0 Filed at: 12/27/2023 1452   Audit-C Score 0 Filed at: 12/27/2023 1452   RENZO: How many times in the past year have you...    Used an illegal drug or used a prescription medication for non-medical reasons? Never Filed at: 12/27/2023 1452                      Medical Decision Making  Decision making 78-year-old male presents emergency department with rash on his face consistent with a topical application of a cologne.  Consistent with contact dermatitis.  Discussed steroid use with the patient agrees to prednisone.  Shared decision making.   We discussed treatment and follow-up.    Risk  Prescription drug management.             Disposition  Final diagnoses:   Contact dermatitis     Time reflects when diagnosis was documented in both MDM as applicable and the Disposition within this note       Time User Action Codes Description Comment    12/27/2023  2:44 PM Michael Garibay [L25.9] Contact dermatitis           ED Disposition       ED Disposition   Discharge    Condition   Stable    Date/Time   Wed Dec 27, 2023 1444    Comment   Altaf Thurston discharge to home/self care.                   Follow-up Information    None         Discharge Medication List as of 12/27/2023  2:44 PM        START taking these medications    Details   predniSONE 20 mg tablet Take 2 tablets (40 mg total) by mouth daily for 5 days, Starting Wed 12/27/2023, Until Mon 1/1/2024, Normal           CONTINUE these medications which have NOT CHANGED    Details   acetaminophen (TYLENOL) 500 mg tablet Take 500 mg by mouth every 6 (six) hours as needed for mild pain, Historical Med      albuterol (PROVENTIL HFA,VENTOLIN HFA) 90 mcg/act inhaler Inhale 2 puffs every 6 (six) hours as needed for wheezing, Historical Med      alfuzosin (UROXATRAL) 10 mg 24 hr tablet Take 10 mg by mouth daily, Historical Med      aspirin (ECOTRIN LOW STRENGTH) 81 mg EC tablet 81 mg daily, Starting Thu 1/20/2022, Historical Med      atorvastatin (LIPITOR) 40 mg tablet TAKE 20MG (ONE-HALF TABLET) BY MOUTH EVERY DAY FOR HYPERLIPIDEMIA, Historical Med      budesonide-formoterol (SYMBICORT) 160-4.5 mcg/act inhaler Inhale 2 puffs 2 (two) times a day Rinse mouth after use., Historical Med      CETIRIZINE HCL PO Take 1 tablet by mouth in the morning, Historical Med      chlorhexidine (PERIDEX) 0.12 % solution Apply 15 mL to the mouth or throat 4 (four) times a day (with meals and at bedtime) After meals and before bed, Starting Sat 2/19/2022, Print      Cholecalciferol 50 MCG (2000 UT) TABS TAKE ONE TABLET BY MOUTH  EVERY DAY (FOR LOW VITAMIN D), Historical Med      Coenzyme Q10 100 MG TABS TAKE 400MG  BY MOUTH EVERY MORNING, Historical Med      Docusate Sodium (DSS) 100 MG CAPS 100 mg, Starting Fri 6/9/2023, Historical Med      ferrous gluconate (FERGON) 324 mg tablet Take 324 mg by mouth daily with breakfast, Historical Med      ferrous sulfate 325 (65 Fe) mg tablet 325 mg, Starting Wed 5/31/2023, Historical Med      finasteride (PROSCAR) 5 mg tablet Take 5 mg by mouth daily, Starting Thu 1/6/2022, Historical Med      fluticasone (FLONASE) 50 mcg/act nasal spray INSTILL 2 SPRAYS IN NOSTRIL(S) EVERY DAY AS NEEDED FOR NASAL CONGESTION, Historical Med      ibuprofen (MOTRIN) 800 mg tablet Take 1 tablet (800 mg total) by mouth 3 (three) times a day, Starting Sat 12/9/2023, Normal      ketoconazole (NIZORAL) 2 % cream Historical Med      lactulose 20 g/30 mL Take 30 mL (20 g total) by mouth daily for 3 days, Starting Sat 8/12/2023, Until Tue 8/15/2023, Normal      latanoprost (XALATAN) 0.005 % ophthalmic solution INSTILL 1 DROP IN BOTH EYES AT BEDTIME FOR GLAUCOMA-PROTECT BOTTLE FROM LIGHT., Historical Med      lisinopril (ZESTRIL) 5 mg tablet Take 5 mg by mouth, Starting Thu 1/6/2022, Historical Med      metFORMIN (GLUCOPHAGE) 500 mg tablet Take 500 mg by mouth daily with breakfast, Starting Thu 1/6/2022, Historical Med      Multiple Vitamin (MULTIVITAMINS PO) TAKE ONE CAP/TAB BY MOUTH EVERY DAY FOR SUPPLEMENTATION, Historical Med      Omega-3 Fatty Acids (fish oil) 1,000 mg Take 1,000 mg by mouth daily, Historical Med      omeprazole (PriLOSEC) 20 mg delayed release capsule 20 mg, Starting Mon 4/11/2022, Historical Med      oxyCODONE-acetaminophen (PERCOCET) 5-325 mg per tablet TAKE 1 TABLET BY MOUTH EVERY 6 HOURS AS NEEDED FOR PAIN. RX MUST LAST UNTIL 6/17/2023., Historical Med             No discharge procedures on file.    PDMP Review       None            ED Provider  Electronically Signed by             Michael Garibay  MD  12/27/23 [id number]

## 2024-10-01 ENCOUNTER — HOSPITAL ENCOUNTER (EMERGENCY)
Facility: HOSPITAL | Age: 79
Discharge: HOME | End: 2024-10-01
Attending: EMERGENCY MEDICINE
Payer: COMMERCIAL

## 2024-10-01 VITALS
DIASTOLIC BLOOD PRESSURE: 88 MMHG | HEART RATE: 103 BPM | TEMPERATURE: 98.3 F | OXYGEN SATURATION: 97 % | SYSTOLIC BLOOD PRESSURE: 178 MMHG | RESPIRATION RATE: 18 BRPM

## 2024-10-01 DIAGNOSIS — T78.3XXA ANGIOEDEMA, INITIAL ENCOUNTER: ICD-10-CM

## 2024-10-01 DIAGNOSIS — R22.0 LIP SWELLING: Primary | ICD-10-CM

## 2024-10-01 LAB
ANION GAP SERPL CALCULATED.3IONS-SCNC: 8 MMOL/L (ref 4–13)
BASOPHILS # BLD AUTO: 0.01 THOUSANDS/ÂΜL (ref 0–0.1)
BASOPHILS NFR BLD AUTO: 0 % (ref 0–1)
BUN SERPL-MCNC: 22 MG/DL (ref 5–25)
CALCIUM SERPL-MCNC: 9.2 MG/DL (ref 8.4–10.2)
CHLORIDE SERPL-SCNC: 106 MMOL/L (ref 96–108)
CO2 SERPL-SCNC: 25 MMOL/L (ref 21–32)
CREAT SERPL-MCNC: 1.17 MG/DL (ref 0.6–1.3)
EOSINOPHIL # BLD AUTO: 0.09 THOUSAND/ÂΜL (ref 0–0.61)
EOSINOPHIL NFR BLD AUTO: 2 % (ref 0–6)
ERYTHROCYTE [DISTWIDTH] IN BLOOD BY AUTOMATED COUNT: 14.3 % (ref 11.6–15.1)
GFR SERPL CREATININE-BSD FRML MDRD: 58 ML/MIN/1.73SQ M
GLUCOSE SERPL-MCNC: 90 MG/DL (ref 65–140)
HCT VFR BLD AUTO: 41.9 % (ref 36.5–49.3)
HGB BLD-MCNC: 12.8 G/DL (ref 12–17)
IMM GRANULOCYTES # BLD AUTO: 0.01 THOUSAND/UL (ref 0–0.2)
IMM GRANULOCYTES NFR BLD AUTO: 0 % (ref 0–2)
LYMPHOCYTES # BLD AUTO: 1.72 THOUSANDS/ÂΜL (ref 0.6–4.47)
LYMPHOCYTES NFR BLD AUTO: 36 % (ref 14–44)
MCH RBC QN AUTO: 22.4 PG (ref 26.8–34.3)
MCHC RBC AUTO-ENTMCNC: 30.5 G/DL (ref 31.4–37.4)
MCV RBC AUTO: 73 FL (ref 82–98)
MONOCYTES # BLD AUTO: 0.44 THOUSAND/ÂΜL (ref 0.17–1.22)
MONOCYTES NFR BLD AUTO: 9 % (ref 4–12)
NEUTROPHILS # BLD AUTO: 2.49 THOUSANDS/ÂΜL (ref 1.85–7.62)
NEUTS SEG NFR BLD AUTO: 53 % (ref 43–75)
NRBC BLD AUTO-RTO: 0 /100 WBCS
PLATELET # BLD AUTO: 242 THOUSANDS/UL (ref 149–390)
PMV BLD AUTO: 10.3 FL (ref 8.9–12.7)
POTASSIUM SERPL-SCNC: 3.6 MMOL/L (ref 3.5–5.3)
RBC # BLD AUTO: 5.71 MILLION/UL (ref 3.88–5.62)
SODIUM SERPL-SCNC: 139 MMOL/L (ref 135–147)
WBC # BLD AUTO: 4.76 THOUSAND/UL (ref 4.31–10.16)

## 2024-10-01 PROCEDURE — 36415 COLL VENOUS BLD VENIPUNCTURE: CPT | Performed by: EMERGENCY MEDICINE

## 2024-10-01 PROCEDURE — 93005 ELECTROCARDIOGRAM TRACING: CPT

## 2024-10-01 PROCEDURE — 80048 BASIC METABOLIC PNL TOTAL CA: CPT | Performed by: EMERGENCY MEDICINE

## 2024-10-01 PROCEDURE — 85025 COMPLETE CBC W/AUTO DIFF WBC: CPT | Performed by: EMERGENCY MEDICINE

## 2024-10-01 PROCEDURE — 99284 EMERGENCY DEPT VISIT MOD MDM: CPT | Performed by: EMERGENCY MEDICINE

## 2024-10-01 PROCEDURE — 96374 THER/PROPH/DIAG INJ IV PUSH: CPT

## 2024-10-01 PROCEDURE — 99284 EMERGENCY DEPT VISIT MOD MDM: CPT

## 2024-10-01 PROCEDURE — 96375 TX/PRO/DX INJ NEW DRUG ADDON: CPT

## 2024-10-01 RX ORDER — AMLODIPINE BESYLATE 5 MG/1
5 TABLET ORAL DAILY
Qty: 30 TABLET | Refills: 0 | Status: SHIPPED | OUTPATIENT
Start: 2024-10-01 | End: 2024-10-31

## 2024-10-01 RX ORDER — DIPHENHYDRAMINE HYDROCHLORIDE 50 MG/ML
50 INJECTION INTRAMUSCULAR; INTRAVENOUS ONCE
Status: COMPLETED | OUTPATIENT
Start: 2024-10-01 | End: 2024-10-01

## 2024-10-01 RX ORDER — UREA 10 %
500 LOTION (ML) TOPICAL DAILY
COMMUNITY

## 2024-10-01 RX ORDER — DEXAMETHASONE SODIUM PHOSPHATE 10 MG/ML
8 INJECTION, SOLUTION INTRAMUSCULAR; INTRAVENOUS ONCE
Status: COMPLETED | OUTPATIENT
Start: 2024-10-01 | End: 2024-10-01

## 2024-10-01 RX ADMIN — DIPHENHYDRAMINE HYDROCHLORIDE 50 MG: 50 INJECTION, SOLUTION INTRAMUSCULAR; INTRAVENOUS at 17:43

## 2024-10-01 RX ADMIN — DEXAMETHASONE SODIUM PHOSPHATE 8 MG: 10 INJECTION, SOLUTION INTRAMUSCULAR; INTRAVENOUS at 17:43

## 2024-10-01 NOTE — DISCHARGE INSTRUCTIONS
Stop lisinipril  A  personal message from Dr. Jero Duran,  Thank you so much for allowing me to care for you today.    I pride myself in the care and attention I give all my patients.  I hope you were a witness to this tonight.   If for any reason your condition does not improve or worsens, or you have a question that was not answered during your visit you can feel free to text me on my personal phone #  # [phone].   I will answer to your message and continue your care past your emergency room visit.     Please understand that although you are being discharged because your condition has been deemed stable and able to be managed on an outpatient setting. However your condition may worsen as part of the natural progression of the illness/condition, if this occurs please come back to the emergency department for a repeat evaluation.

## 2024-10-02 LAB
ATRIAL RATE: 97 BPM
P AXIS: 67 DEGREES
PR INTERVAL: 154 MS
QRS AXIS: 269 DEGREES
QRSD INTERVAL: 100 MS
QT INTERVAL: 358 MS
QTC INTERVAL: 454 MS
T WAVE AXIS: 51 DEGREES
VENTRICULAR RATE: 97 BPM

## 2024-10-02 PROCEDURE — 93010 ELECTROCARDIOGRAM REPORT: CPT | Performed by: INTERNAL MEDICINE

## 2024-10-02 NOTE — ED PROVIDER NOTES
Final diagnoses:   Lip swelling   Angioedema, initial encounter     ED Disposition       ED Disposition   Discharge    Condition   Stable    Date/Time   Tue Oct 1, 2024  6:15 PM    Comment   Altaf Thurston discharge to home/self care.                   Assessment & Plan       Medical Decision Making  Pt observed in the ER  This onset was alreadey >12 hours ago  No trouble breathing or swallowing  No change in condition here in the ER     Problems Addressed:  Angioedema, initial encounter: acute illness or injury    Amount and/or Complexity of Data Reviewed  Labs: ordered. Decision-making details documented in ED Course.    Risk  Prescription drug management.        ED Course as of 10/01/24 2229   Tue Oct 01, 2024   1738 EKG is normal sinus rhythm with right axis.  Incomplete right bundle branch block no ST elevations or depressions.   1800 WBC: 4.76   1800 Hemoglobin: 12.8       Medications   diphenhydrAMINE (BENADRYL) injection 50 mg (50 mg Intravenous Given 10/1/24 1743)   dexamethasone (PF) (DECADRON) injection 8 mg (8 mg Intravenous Given 10/1/24 1743)       ED Risk Strat Scores                           SBIRT 20yo+      Flowsheet Row Most Recent Value   Initial Alcohol Screen: US AUDIT-C     1. How often do you have a drink containing alcohol? 0 Filed at: 10/01/2024 1732   2. How many drinks containing alcohol do you have on a typical day you are drinking?  0 Filed at: 10/01/2024 1732   3a. Male UNDER 65: How often do you have five or more drinks on one occasion? 0 Filed at: 10/01/2024 1732   Audit-C Score 0 Filed at: 10/01/2024 1732   RENZO: How many times in the past year have you...    Used an illegal drug or used a prescription medication for non-medical reasons? Never Filed at: 10/01/2024 1732                            History of Present Illness       Chief Complaint   Patient presents with    Lip Swelling     C/o upper left side of lip swelling today now bottom lower lip is swollen. Denies trouble  breathing at this time. Also states palpitations.        Past Medical History:   Diagnosis Date    Asthma     BPH (benign prostatic hyperplasia)     Dental disease     Diabetes mellitus (HCC)     GERD (gastroesophageal reflux disease)     Hypertension       Past Surgical History:   Procedure Laterality Date    COLONOSCOPY      HERNIA REPAIR      SUBMANDIBULAR DUCT STONE EXCISION N/A 2023    Procedure: PAROTID SIALOENDOSCOPY WITH WASHOUT;  Surgeon: Angel Dasilva MD;  Location: [facility];  Service: ENT    TONSILLECTOMY      UPPER GASTROINTESTINAL ENDOSCOPY        Family History   Problem Relation Age of Onset    Lupus Mother     Heart failure Father     Crohn's disease Sister       Social History     Tobacco Use    Smoking status: Former     Current packs/day: 0.00     Average packs/day: 0.3 packs/day for 27.6 years (6.9 ttl pk-yrs)     Types: Cigarettes     Start date: 1962     Quit date: 3/5/1990     Years since quittin.6    Smokeless tobacco: Never   Vaping Use    Vaping status: Never Used   Substance Use Topics    Alcohol use: Not Currently     Comment: Clean & Sober    Drug use: Not Currently     Types: Cocaine     Comment: Clean & Sober 25 yrs      E-Cigarette/Vaping    E-Cigarette Use Never User       E-Cigarette/Vaping Substances    Nicotine No       I have reviewed and agree with the history as documented.     Altaf Thurston is a 79 y.o.  year old male  Past Medical History:  No date: Asthma  No date: BPH (benign prostatic hyperplasia)  No date: Dental disease  No date: Diabetes mellitus (HCC)  No date: GERD (gastroesophageal reflux disease)  No date: Hypertension  Social History    Tobacco Use      Smoking status: Former        Packs/day: 0.00        Years: 0.3 packs/day for 27.6 years (6.9 ttl pk-yrs)        Types: Cigarettes        Start date: 1962        Quit date: 3/5/1990        Years since quittin.6      Smokeless tobacco: Never    Vaping Use      Vaping status: Never Used     Alcohol use: Not Currently      Comment: Clean & Sober    Drug use: Not Currently      Types: Cocaine      Comment: Clean & Sober 25 yrs    Patient presents with:  Lip Swelling: C/o upper left side of lip swelling today now bottom lower lip is swollen. Denies trouble breathing at this time. Also states palpitations.   Does take lisinopril    History obtained directly from the PATIENT              History provided by:  Patient   used: No        Review of Systems   Constitutional:  Negative for chills and fever.   HENT:  Negative for ear pain and sore throat.    Eyes:  Negative for pain and visual disturbance.   Respiratory:  Negative for cough and shortness of breath.    Cardiovascular:  Negative for chest pain and palpitations.   Gastrointestinal:  Negative for abdominal pain and vomiting.   Genitourinary:  Negative for dysuria and hematuria.   Musculoskeletal:  Negative for arthralgias and back pain.   Skin:  Negative for color change and rash.   Neurological:  Negative for seizures and syncope.   All other systems reviewed and are negative.          Objective       ED Triage Vitals [10/01/24 1733]   Temperature Pulse Blood Pressure Respirations SpO2 Patient Position - Orthostatic VS   98.3 °F (36.8 °C) 103 (!) 178/88 18 97 % Sitting      Temp Source Heart Rate Source BP Location FiO2 (%) Pain Score    Oral Monitor Left arm -- --      Vitals      Date and Time Temp Pulse SpO2 Resp BP Pain Score FACES Pain Rating User   10/01/24 1733 98.3 °F (36.8 °C) 103 97 % 18 178/88 -- -- AM            Physical Exam  Vitals and nursing note reviewed.   Constitutional:       General: He is not in acute distress.     Appearance: He is well-developed.   HENT:      Head: Normocephalic and atraumatic.      Right Ear: External ear normal.      Mouth/Throat:      Comments: Localized swelling to lower L side of lip  NO tongue involvement   Eyes:      Conjunctiva/sclera: Conjunctivae normal.   Cardiovascular:       Rate and Rhythm: Normal rate and regular rhythm.      Heart sounds: No murmur heard.  Pulmonary:      Effort: Pulmonary effort is normal. No respiratory distress.      Breath sounds: Normal breath sounds.   Abdominal:      Palpations: Abdomen is soft.      Tenderness: There is no abdominal tenderness.   Musculoskeletal:         General: No swelling.      Cervical back: Neck supple.   Skin:     General: Skin is warm and dry.      Capillary Refill: Capillary refill takes less than 2 seconds.   Neurological:      General: No focal deficit present.      Mental Status: He is alert and oriented to person, place, and time.   Psychiatric:         Mood and Affect: Mood normal.         Judgment: Judgment normal.         Results Reviewed       Procedure Component Value Units Date/Time    Basic metabolic panel [[id number]] Collected: 10/01/24 1738    Lab Status: Final result Specimen: Blood from Arm, Right Updated: 10/01/24 1808     Sodium 139 mmol/L      Potassium 3.6 mmol/L      Chloride 106 mmol/L      CO2 25 mmol/L      ANION GAP 8 mmol/L      BUN 22 mg/dL      Creatinine 1.17 mg/dL      Glucose 90 mg/dL      Calcium 9.2 mg/dL      eGFR 58 ml/min/1.73sq m     Narrative:      [employer] guidelines for Chronic Kidney Disease (CKD):     Stage 1 with normal or high GFR (GFR > 90 mL/min/1.73 square meters)    Stage 2 Mild CKD (GFR = 60-89 mL/min/1.73 square meters)    Stage 3A Moderate CKD (GFR = 45-59 mL/min/1.73 square meters)    Stage 3B Moderate CKD (GFR = 30-44 mL/min/1.73 square meters)    Stage 4 Severe CKD (GFR = 15-29 mL/min/1.73 square meters)    Stage 5 End Stage CKD (GFR <15 mL/min/1.73 square meters)  Note: GFR calculation is accurate only with a steady state creatinine    CBC and differential [[id number]]  (Abnormal) Collected: 10/01/24 1738    Lab Status: Final result Specimen: Blood from Arm, Right Updated: 10/01/24 1745     WBC 4.76 Thousand/uL      RBC 5.71 Million/uL      Hemoglobin 12.8  g/dL      Hematocrit 41.9 %      MCV 73 fL      MCH 22.4 pg      MCHC 30.5 g/dL      RDW 14.3 %      MPV 10.3 fL      Platelets 242 Thousands/uL      nRBC 0 /100 WBCs      Segmented % 53 %      Immature Grans % 0 %      Lymphocytes % 36 %      Monocytes % 9 %      Eosinophils Relative 2 %      Basophils Relative 0 %      Absolute Neutrophils 2.49 Thousands/µL      Absolute Immature Grans 0.01 Thousand/uL      Absolute Lymphocytes 1.72 Thousands/µL      Absolute Monocytes 0.44 Thousand/µL      Eosinophils Absolute 0.09 Thousand/µL      Basophils Absolute 0.01 Thousands/µL             No orders to display       Procedures    ED Medication and Procedure Management   Prior to Admission Medications   Prescriptions Last Dose Informant Patient Reported? Taking?   CETIRIZINE HCL PO  Self Yes No   Sig: Take 1 tablet by mouth in the morning   Cholecalciferol 50 MCG (2000 UT) TABS  Self Yes No   Sig: TAKE ONE TABLET BY MOUTH EVERY DAY (FOR LOW VITAMIN D)   Coenzyme Q10 100 MG TABS  Self Yes No   Sig: TAKE 400MG  BY MOUTH EVERY MORNING   Docusate Sodium (DSS) 100 MG CAPS   Yes No   Si mg   Multiple Vitamin (MULTIVITAMINS PO)  Self Yes No   Sig: TAKE ONE CAP/TAB BY MOUTH EVERY DAY FOR SUPPLEMENTATION   Omega-3 Fatty Acids (fish oil) 1,000 mg   Yes No   Sig: Take 1,000 mg by mouth daily   acetaminophen (TYLENOL) 500 mg tablet   Yes No   Sig: Take 500 mg by mouth every 6 (six) hours as needed for mild pain   albuterol (PROVENTIL HFA,VENTOLIN HFA) 90 mcg/act inhaler  Self Yes No   Sig: Inhale 2 puffs every 6 (six) hours as needed for wheezing   alfuzosin (UROXATRAL) 10 mg 24 hr tablet 2024  Yes Yes   Sig: Take 10 mg by mouth daily   aspirin (ECOTRIN LOW STRENGTH) 81 mg EC tablet  Self Yes No   Si mg daily   atorvastatin (LIPITOR) 40 mg tablet 2024  Yes Yes   Sig: TAKE 20MG (ONE-HALF TABLET) BY MOUTH EVERY DAY FOR HYPERLIPIDEMIA   budesonide-formoterol (SYMBICORT) 160-4.5 mcg/act inhaler  Self Yes No   Sig:  Inhale 2 puffs 2 (two) times a day Rinse mouth after use.   chlorhexidine (PERIDEX) 0.12 % solution  Self No No   Sig: Apply 15 mL to the mouth or throat 4 (four) times a day (with meals and at bedtime) After meals and before bed   ferrous gluconate (FERGON) 324 mg tablet  Self Yes No   Sig: Take 324 mg by mouth daily with breakfast   ferrous sulfate 325 (65 Fe) mg tablet   Yes No   Si mg   finasteride (PROSCAR) 5 mg tablet  Self Yes No   Sig: Take 5 mg by mouth daily   fluticasone (FLONASE) 50 mcg/act nasal spray  Self Yes No   Sig: INSTILL 2 SPRAYS IN NOSTRIL(S) EVERY DAY AS NEEDED FOR NASAL CONGESTION   ibuprofen (MOTRIN) 800 mg tablet   No No   Sig: Take 1 tablet (800 mg total) by mouth 3 (three) times a day   ketoconazole (NIZORAL) 2 % cream  Self Yes No   lactulose 20 g/30 mL   No No   Sig: Take 30 mL (20 g total) by mouth daily for 3 days   latanoprost (XALATAN) 0.005 % ophthalmic solution  Self Yes No   Sig: INSTILL 1 DROP IN BOTH EYES AT BEDTIME FOR GLAUCOMA-PROTECT BOTTLE FROM LIGHT.   lisinopril (ZESTRIL) 5 mg tablet  Self Yes No   Sig: Take 5 mg by mouth   magnesium gluconate (MAGONATE) 500 mg tablet   Yes Yes   Sig: Take 500 mg by mouth in the morning   metFORMIN (GLUCOPHAGE) 500 mg tablet  Self Yes No   Sig: Take 500 mg by mouth daily with breakfast   omeprazole (PriLOSEC) 20 mg delayed release capsule  Self Yes No   Si mg   oxyCODONE-acetaminophen (PERCOCET) 5-325 mg per tablet   Yes No   Sig: TAKE 1 TABLET BY MOUTH EVERY 6 HOURS AS NEEDED FOR PAIN. RX MUST LAST UNTIL 2023.      Facility-Administered Medications: None     Discharge Medication List as of 10/1/2024  6:16 PM        START taking these medications    Details   amLODIPine (NORVASC) 5 mg tablet Take 1 tablet (5 mg total) by mouth daily, Starting Tue 10/1/2024, Until Thu 10/31/2024, Normal           CONTINUE these medications which have NOT CHANGED    Details   alfuzosin (UROXATRAL) 10 mg 24 hr tablet Take 10 mg by mouth  daily, Historical Med      atorvastatin (LIPITOR) 40 mg tablet TAKE 20MG (ONE-HALF TABLET) BY MOUTH EVERY DAY FOR HYPERLIPIDEMIA, Historical Med      magnesium gluconate (MAGONATE) 500 mg tablet Take 500 mg by mouth in the morning, Historical Med      acetaminophen (TYLENOL) 500 mg tablet Take 500 mg by mouth every 6 (six) hours as needed for mild pain, Historical Med      albuterol (PROVENTIL HFA,VENTOLIN HFA) 90 mcg/act inhaler Inhale 2 puffs every 6 (six) hours as needed for wheezing, Historical Med      aspirin (ECOTRIN LOW STRENGTH) 81 mg EC tablet 81 mg daily, Starting Thu 1/20/2022, Historical Med      budesonide-formoterol (SYMBICORT) 160-4.5 mcg/act inhaler Inhale 2 puffs 2 (two) times a day Rinse mouth after use., Historical Med      CETIRIZINE HCL PO Take 1 tablet by mouth in the morning, Historical Med      chlorhexidine (PERIDEX) 0.12 % solution Apply 15 mL to the mouth or throat 4 (four) times a day (with meals and at bedtime) After meals and before bed, Starting Sat 2/19/2022, Print      Cholecalciferol 50 MCG (2000 UT) TABS TAKE ONE TABLET BY MOUTH EVERY DAY (FOR LOW VITAMIN D), Historical Med      Coenzyme Q10 100 MG TABS TAKE 400MG  BY MOUTH EVERY MORNING, Historical Med      Docusate Sodium (DSS) 100 MG CAPS 100 mg, Starting Fri 6/9/2023, Historical Med      ferrous gluconate (FERGON) 324 mg tablet Take 324 mg by mouth daily with breakfast, Historical Med      ferrous sulfate 325 (65 Fe) mg tablet 325 mg, Starting Wed 5/31/2023, Historical Med      finasteride (PROSCAR) 5 mg tablet Take 5 mg by mouth daily, Starting Thu 1/6/2022, Historical Med      fluticasone (FLONASE) 50 mcg/act nasal spray INSTILL 2 SPRAYS IN NOSTRIL(S) EVERY DAY AS NEEDED FOR NASAL CONGESTION, Historical Med      ibuprofen (MOTRIN) 800 mg tablet Take 1 tablet (800 mg total) by mouth 3 (three) times a day, Starting Sat 12/9/2023, Normal      ketoconazole (NIZORAL) 2 % cream Historical Med      lactulose 20 g/30 mL Take 30 mL  (20 g total) by mouth daily for 3 days, Starting Sat 8/12/2023, Until Tue 8/15/2023, Normal      latanoprost (XALATAN) 0.005 % ophthalmic solution INSTILL 1 DROP IN BOTH EYES AT BEDTIME FOR GLAUCOMA-PROTECT BOTTLE FROM LIGHT., Historical Med      lisinopril (ZESTRIL) 5 mg tablet Take 5 mg by mouth, Starting Thu 1/6/2022, Historical Med      metFORMIN (GLUCOPHAGE) 500 mg tablet Take 500 mg by mouth daily with breakfast, Starting Thu 1/6/2022, Historical Med      Multiple Vitamin (MULTIVITAMINS PO) TAKE ONE CAP/TAB BY MOUTH EVERY DAY FOR SUPPLEMENTATION, Historical Med      Omega-3 Fatty Acids (fish oil) 1,000 mg Take 1,000 mg by mouth daily, Historical Med      omeprazole (PriLOSEC) 20 mg delayed release capsule 20 mg, Starting Mon 4/11/2022, Historical Med      oxyCODONE-acetaminophen (PERCOCET) 5-325 mg per tablet TAKE 1 TABLET BY MOUTH EVERY 6 HOURS AS NEEDED FOR PAIN. RX MUST LAST UNTIL 6/17/2023., Historical Med           No discharge procedures on file.  ED SEPSIS DOCUMENTATION   Time reflects when diagnosis was documented in both MDM as applicable and the Disposition within this note       Time User Action Codes Description Comment    10/1/2024  6:15 PM Jero Duran [R22.0] Lip swelling     10/1/2024  6:15 PM Jero Duran [T78.3XXA] Angioedema, initial encounter                  Jero Duran MD  10/01/24 7859

## 2024-11-19 ENCOUNTER — HOSPITAL ENCOUNTER (EMERGENCY)
Facility: HOSPITAL | Age: 79
Discharge: HOME | End: 2024-11-19
Attending: EMERGENCY MEDICINE
Payer: COMMERCIAL

## 2024-11-19 VITALS
OXYGEN SATURATION: 98 % | RESPIRATION RATE: 22 BRPM | SYSTOLIC BLOOD PRESSURE: 176 MMHG | DIASTOLIC BLOOD PRESSURE: 96 MMHG | TEMPERATURE: 98.7 F | HEART RATE: 88 BPM

## 2024-11-19 DIAGNOSIS — K57.92 DIVERTICULITIS: Primary | ICD-10-CM

## 2024-11-19 PROCEDURE — 99283 EMERGENCY DEPT VISIT LOW MDM: CPT

## 2024-11-19 PROCEDURE — 99284 EMERGENCY DEPT VISIT MOD MDM: CPT | Performed by: NURSE PRACTITIONER

## 2024-11-19 RX ORDER — FAMOTIDINE 20 MG/1
20 TABLET, FILM COATED ORAL 2 TIMES DAILY
Qty: 10 TABLET | Refills: 0 | Status: SHIPPED | OUTPATIENT
Start: 2024-11-19 | End: 2024-11-24

## 2024-11-19 RX ADMIN — AMOXICILLIN AND CLAVULANATE POTASSIUM 1 TABLET: 875; 125 TABLET, FILM COATED ORAL at 13:27

## 2024-11-19 NOTE — ED PROVIDER NOTES
Time reflects when diagnosis was documented in both MDM as applicable and the Disposition within this note       Time User Action Codes Description Comment    11/19/2024  1:03 PM Jordan Chilango Add [K57.92] Diverticulitis           ED Disposition       ED Disposition   Discharge    Condition   Stable    Date/Time   Tue Nov 19, 2024  1:03 PM    Comment   Altaf Thurston discharge to home/self care.                   Assessment & Plan       Medical Decision Making  Patient has a history of diverticulitis and states this feels the same.  Reports about 3 days of abdominal discomfort in the left lower quadrant.  He is also endorsing some loose stool.  He denies any fever or chills.  He has no nausea or vomiting.  He is not guarded.  He would like to begin antibiotic therapy and forego imaging and lab work at this point.  He understands reasons to return.    Risk  Prescription drug management.             Medications   amoxicillin-clavulanate (AUGMENTIN) 875-125 mg per tablet 1 tablet (1 tablet Oral Given 11/19/24 1327)       ED Risk Strat Scores                                               History of Present Illness       Chief Complaint   Patient presents with    Abdominal Pain     LLQ pain for the past 3 days. Hx of Diverticulitis. +nausea       Past Medical History:   Diagnosis Date    Asthma     BPH (benign prostatic hyperplasia)     Dental disease     Diabetes mellitus (HCC)     GERD (gastroesophageal reflux disease)     Hypertension       Past Surgical History:   Procedure Laterality Date    COLONOSCOPY      HERNIA REPAIR      SUBMANDIBULAR DUCT STONE EXCISION N/A 7/12/2023    Procedure: PAROTID SIALOENDOSCOPY WITH WASHOUT;  Surgeon: Angel Dasilva MD;  Location: [facility];  Service: ENT    TONSILLECTOMY      UPPER GASTROINTESTINAL ENDOSCOPY        Family History   Problem Relation Age of Onset    Lupus Mother     Heart failure Father     Crohn's disease Sister       Social History     Tobacco Use    Smoking  status: Former     Current packs/day: 0.00     Average packs/day: 0.3 packs/day for 27.6 years (6.9 ttl pk-yrs)     Types: Cigarettes     Start date: 1962     Quit date: 3/5/1990     Years since quittin.7    Smokeless tobacco: Never   Vaping Use    Vaping status: Never Used   Substance Use Topics    Alcohol use: Not Currently     Comment: Clean & Sober    Drug use: Not Currently     Types: Cocaine     Comment: Clean & Sober 25 yrs      E-Cigarette/Vaping    E-Cigarette Use Never User       E-Cigarette/Vaping Substances    Nicotine No       I have reviewed and agree with the history as documented.     79-year-old male patient presenting to the emergency department with a chief complaint of left lower quadrant abdominal pain for the last day or 2 with some associated loose stool.  He has a history of diverticulitis and states it feels the same.  He denies any fever or chills.  He is not guarded during exam, nonperitoneal.  He has no flank tenderness no difficulty urinating.      Abdominal Pain  Associated symptoms: diarrhea    Associated symptoms: no chest pain, no chills, no cough, no dysuria, no fever, no hematuria, no shortness of breath, no sore throat and no vomiting        Review of Systems   Constitutional:  Negative for chills and fever.   HENT:  Negative for ear pain and sore throat.    Eyes:  Negative for pain and visual disturbance.   Respiratory:  Negative for cough and shortness of breath.    Cardiovascular:  Negative for chest pain and palpitations.   Gastrointestinal:  Positive for abdominal pain and diarrhea. Negative for vomiting.   Genitourinary:  Negative for dysuria and hematuria.   Musculoskeletal:  Negative for arthralgias and back pain.   Skin:  Negative for color change and rash.   Neurological:  Negative for seizures and syncope.   All other systems reviewed and are negative.          Objective       ED Triage Vitals [24 1225]   Temperature Pulse Blood Pressure Respirations SpO2  Patient Position - Orthostatic VS   98.7 °F (37.1 °C) 88 (!) 176/96 22 98 % Sitting      Temp Source Heart Rate Source BP Location FiO2 (%) Pain Score    Oral Monitor Left arm -- --      Vitals      Date and Time Temp Pulse SpO2 Resp BP Pain Score FACES Pain Rating User   11/19/24 1225 98.7 °F (37.1 °C) 88 98 % 22 176/96 -- -- AS            Physical Exam  Vitals and nursing note reviewed.   Constitutional:       General: He is not in acute distress.     Appearance: He is well-developed.   HENT:      Head: Normocephalic and atraumatic.      Nose: No rhinorrhea.   Eyes:      General:         Right eye: No discharge.         Left eye: No discharge.      Conjunctiva/sclera: Conjunctivae normal.   Cardiovascular:      Rate and Rhythm: Normal rate.   Pulmonary:      Effort: Pulmonary effort is normal. No respiratory distress.   Abdominal:      General: There is no distension.      Tenderness: There is abdominal tenderness in the left lower quadrant. There is no right CVA tenderness, left CVA tenderness, guarding or rebound. Negative signs include Segovia's sign, Rovsing's sign, McBurney's sign, psoas sign and obturator sign.      Hernia: No hernia is present.   Musculoskeletal:         General: No deformity.      Cervical back: Normal range of motion and neck supple.   Skin:     General: Skin is warm and dry.      Coloration: Skin is not pale.   Neurological:      Mental Status: He is alert and oriented to person, place, and time.      Coordination: Coordination normal.   Psychiatric:         Mood and Affect: Mood normal.         Results Reviewed       None            No orders to display       Procedures    ED Medication and Procedure Management   Prior to Admission Medications   Prescriptions Last Dose Informant Patient Reported? Taking?   CETIRIZINE HCL PO  Self Yes No   Sig: Take 1 tablet by mouth in the morning   Cholecalciferol 50 MCG (2000 UT) TABS  Self Yes No   Sig: TAKE ONE TABLET BY MOUTH EVERY DAY (FOR LOW  VITAMIN D)   Coenzyme Q10 100 MG TABS  Self Yes No   Sig: TAKE 400MG  BY MOUTH EVERY MORNING   Docusate Sodium (DSS) 100 MG CAPS   Yes No   Si mg   Multiple Vitamin (MULTIVITAMINS PO)  Self Yes No   Sig: TAKE ONE CAP/TAB BY MOUTH EVERY DAY FOR SUPPLEMENTATION   Omega-3 Fatty Acids (fish oil) 1,000 mg   Yes No   Sig: Take 1,000 mg by mouth daily   acetaminophen (TYLENOL) 500 mg tablet   Yes No   Sig: Take 500 mg by mouth every 6 (six) hours as needed for mild pain   albuterol (PROVENTIL HFA,VENTOLIN HFA) 90 mcg/act inhaler  Self Yes No   Sig: Inhale 2 puffs every 6 (six) hours as needed for wheezing   alfuzosin (UROXATRAL) 10 mg 24 hr tablet   Yes No   Sig: Take 10 mg by mouth daily   amLODIPine (NORVASC) 5 mg tablet   No No   Sig: Take 1 tablet (5 mg total) by mouth daily   aspirin (ECOTRIN LOW STRENGTH) 81 mg EC tablet  Self Yes No   Si mg daily   atorvastatin (LIPITOR) 40 mg tablet   Yes No   Sig: TAKE 20MG (ONE-HALF TABLET) BY MOUTH EVERY DAY FOR HYPERLIPIDEMIA   budesonide-formoterol (SYMBICORT) 160-4.5 mcg/act inhaler  Self Yes No   Sig: Inhale 2 puffs 2 (two) times a day Rinse mouth after use.   chlorhexidine (PERIDEX) 0.12 % solution  Self No No   Sig: Apply 15 mL to the mouth or throat 4 (four) times a day (with meals and at bedtime) After meals and before bed   ferrous gluconate (FERGON) 324 mg tablet  Self Yes No   Sig: Take 324 mg by mouth daily with breakfast   ferrous sulfate 325 (65 Fe) mg tablet   Yes No   Si mg   finasteride (PROSCAR) 5 mg tablet  Self Yes No   Sig: Take 5 mg by mouth daily   fluticasone (FLONASE) 50 mcg/act nasal spray  Self Yes No   Sig: INSTILL 2 SPRAYS IN NOSTRIL(S) EVERY DAY AS NEEDED FOR NASAL CONGESTION   ibuprofen (MOTRIN) 800 mg tablet   No No   Sig: Take 1 tablet (800 mg total) by mouth 3 (three) times a day   ketoconazole (NIZORAL) 2 % cream  Self Yes No   lactulose 20 g/30 mL   No No   Sig: Take 30 mL (20 g total) by mouth daily for 3 days   latanoprost  (XALATAN) 0.005 % ophthalmic solution  Self Yes No   Sig: INSTILL 1 DROP IN BOTH EYES AT BEDTIME FOR GLAUCOMA-PROTECT BOTTLE FROM LIGHT.   lisinopril (ZESTRIL) 5 mg tablet  Self Yes No   Sig: Take 5 mg by mouth   magnesium gluconate (MAGONATE) 500 mg tablet   Yes No   Sig: Take 500 mg by mouth in the morning   metFORMIN (GLUCOPHAGE) 500 mg tablet  Self Yes No   Sig: Take 500 mg by mouth daily with breakfast   omeprazole (PriLOSEC) 20 mg delayed release capsule  Self Yes No   Si mg   oxyCODONE-acetaminophen (PERCOCET) 5-325 mg per tablet   Yes No   Sig: TAKE 1 TABLET BY MOUTH EVERY 6 HOURS AS NEEDED FOR PAIN. RX MUST LAST UNTIL 2023.      Facility-Administered Medications: None     Discharge Medication List as of 2024  1:07 PM        START taking these medications    Details   amoxicillin-clavulanate (AUGMENTIN) 875-125 mg per tablet Take 1 tablet by mouth 2 (two) times a day for 10 days, Starting 2024, Until 2024, Normal      famotidine (PEPCID) 20 mg tablet Take 1 tablet (20 mg total) by mouth 2 (two) times a day for 5 days, Starting 2024, Until Sun 2024, Normal           CONTINUE these medications which have NOT CHANGED    Details   acetaminophen (TYLENOL) 500 mg tablet Take 500 mg by mouth every 6 (six) hours as needed for mild pain, Historical Med      albuterol (PROVENTIL HFA,VENTOLIN HFA) 90 mcg/act inhaler Inhale 2 puffs every 6 (six) hours as needed for wheezing, Historical Med      alfuzosin (UROXATRAL) 10 mg 24 hr tablet Take 10 mg by mouth daily, Historical Med      amLODIPine (NORVASC) 5 mg tablet Take 1 tablet (5 mg total) by mouth daily, Starting Tue 10/1/2024, Until Thu 10/31/2024, Normal      aspirin (ECOTRIN LOW STRENGTH) 81 mg EC tablet 81 mg daily, Starting 2022, Historical Med      atorvastatin (LIPITOR) 40 mg tablet TAKE 20MG (ONE-HALF TABLET) BY MOUTH EVERY DAY FOR HYPERLIPIDEMIA, Historical Med      budesonide-formoterol (SYMBICORT)  160-4.5 mcg/act inhaler Inhale 2 puffs 2 (two) times a day Rinse mouth after use., Historical Med      CETIRIZINE HCL PO Take 1 tablet by mouth in the morning, Historical Med      chlorhexidine (PERIDEX) 0.12 % solution Apply 15 mL to the mouth or throat 4 (four) times a day (with meals and at bedtime) After meals and before bed, Starting Sat 2/19/2022, Print      Cholecalciferol 50 MCG (2000 UT) TABS TAKE ONE TABLET BY MOUTH EVERY DAY (FOR LOW VITAMIN D), Historical Med      Coenzyme Q10 100 MG TABS TAKE 400MG  BY MOUTH EVERY MORNING, Historical Med      Docusate Sodium (DSS) 100 MG CAPS 100 mg, Starting Fri 6/9/2023, Historical Med      ferrous gluconate (FERGON) 324 mg tablet Take 324 mg by mouth daily with breakfast, Historical Med      ferrous sulfate 325 (65 Fe) mg tablet 325 mg, Starting Wed 5/31/2023, Historical Med      finasteride (PROSCAR) 5 mg tablet Take 5 mg by mouth daily, Starting Thu 1/6/2022, Historical Med      fluticasone (FLONASE) 50 mcg/act nasal spray INSTILL 2 SPRAYS IN NOSTRIL(S) EVERY DAY AS NEEDED FOR NASAL CONGESTION, Historical Med      ibuprofen (MOTRIN) 800 mg tablet Take 1 tablet (800 mg total) by mouth 3 (three) times a day, Starting Sat 12/9/2023, Normal      ketoconazole (NIZORAL) 2 % cream Historical Med      lactulose 20 g/30 mL Take 30 mL (20 g total) by mouth daily for 3 days, Starting Sat 8/12/2023, Until Tue 8/15/2023, Normal      latanoprost (XALATAN) 0.005 % ophthalmic solution INSTILL 1 DROP IN BOTH EYES AT BEDTIME FOR GLAUCOMA-PROTECT BOTTLE FROM LIGHT., Historical Med      lisinopril (ZESTRIL) 5 mg tablet Take 5 mg by mouth, Starting Thu 1/6/2022, Historical Med      magnesium gluconate (MAGONATE) 500 mg tablet Take 500 mg by mouth in the morning, Historical Med      metFORMIN (GLUCOPHAGE) 500 mg tablet Take 500 mg by mouth daily with breakfast, Starting Thu 1/6/2022, Historical Med      Multiple Vitamin (MULTIVITAMINS PO) TAKE ONE CAP/TAB BY MOUTH EVERY DAY FOR  SUPPLEMENTATION, Historical Med      Omega-3 Fatty Acids (fish oil) 1,000 mg Take 1,000 mg by mouth daily, Historical Med      omeprazole (PriLOSEC) 20 mg delayed release capsule 20 mg, Starting Mon 4/11/2022, Historical Med      oxyCODONE-acetaminophen (PERCOCET) 5-325 mg per tablet TAKE 1 TABLET BY MOUTH EVERY 6 HOURS AS NEEDED FOR PAIN. RX MUST LAST UNTIL 6/17/2023., Historical Med           No discharge procedures on file.  ED SEPSIS DOCUMENTATION   Time reflects when diagnosis was documented in both MDM as applicable and the Disposition within this note       Time User Action Codes Description Comment    11/19/2024  1:03 PM Chilango Ortega Add [K57.92] Diverticulitis                  LAMBERTO Garcia  11/20/24 1139

## 2025-01-21 ENCOUNTER — APPOINTMENT (EMERGENCY)
Dept: CT IMAGING | Facility: HOSPITAL | Age: 80
End: 2025-01-21
Payer: COMMERCIAL

## 2025-01-21 ENCOUNTER — HOSPITAL ENCOUNTER (EMERGENCY)
Facility: HOSPITAL | Age: 80
Discharge: HOME | End: 2025-01-21
Attending: EMERGENCY MEDICINE
Payer: COMMERCIAL

## 2025-01-21 VITALS
WEIGHT: 188.05 LBS | BODY MASS INDEX: 27.85 KG/M2 | SYSTOLIC BLOOD PRESSURE: 156 MMHG | HEIGHT: 69 IN | RESPIRATION RATE: 19 BRPM | TEMPERATURE: 98 F | DIASTOLIC BLOOD PRESSURE: 80 MMHG | OXYGEN SATURATION: 98 % | HEART RATE: 67 BPM

## 2025-01-21 DIAGNOSIS — R10.12 LEFT UPPER QUADRANT ABDOMINAL PAIN: Primary | ICD-10-CM

## 2025-01-21 LAB
ALBUMIN SERPL BCG-MCNC: 4.3 G/DL (ref 3.5–5)
ALP SERPL-CCNC: 78 U/L (ref 34–104)
ALT SERPL W P-5'-P-CCNC: 59 U/L (ref 7–52)
ANION GAP SERPL CALCULATED.3IONS-SCNC: 3 MMOL/L (ref 4–13)
AST SERPL W P-5'-P-CCNC: 31 U/L (ref 13–39)
BASOPHILS # BLD AUTO: 0.02 THOUSANDS/ΜL (ref 0–0.1)
BASOPHILS NFR BLD AUTO: 0 % (ref 0–1)
BILIRUB SERPL-MCNC: 0.4 MG/DL (ref 0.2–1)
BUN SERPL-MCNC: 22 MG/DL (ref 5–25)
CALCIUM SERPL-MCNC: 9.2 MG/DL (ref 8.4–10.2)
CARDIAC TROPONIN I PNL SERPL HS: <2 NG/L (ref ?–50)
CARDIAC TROPONIN I PNL SERPL HS: <2 NG/L (ref ?–50)
CHLORIDE SERPL-SCNC: 106 MMOL/L (ref 96–108)
CO2 SERPL-SCNC: 30 MMOL/L (ref 21–32)
CREAT SERPL-MCNC: 1.03 MG/DL (ref 0.6–1.3)
D DIMER PPP FEU-MCNC: <0.27 UG/ML FEU
EOSINOPHIL # BLD AUTO: 0.29 THOUSAND/ΜL (ref 0–0.61)
EOSINOPHIL NFR BLD AUTO: 5 % (ref 0–6)
ERYTHROCYTE [DISTWIDTH] IN BLOOD BY AUTOMATED COUNT: 14.3 % (ref 11.6–15.1)
GFR SERPL CREATININE-BSD FRML MDRD: 68 ML/MIN/1.73SQ M
GLUCOSE SERPL-MCNC: 92 MG/DL (ref 65–140)
HCT VFR BLD AUTO: 40.1 % (ref 36.5–49.3)
HGB BLD-MCNC: 12.2 G/DL (ref 12–17)
IMM GRANULOCYTES # BLD AUTO: 0.01 THOUSAND/UL (ref 0–0.2)
IMM GRANULOCYTES NFR BLD AUTO: 0 % (ref 0–2)
LIPASE SERPL-CCNC: 43 U/L (ref 11–82)
LYMPHOCYTES # BLD AUTO: 2.03 THOUSANDS/ΜL (ref 0.6–4.47)
LYMPHOCYTES NFR BLD AUTO: 38 % (ref 14–44)
MCH RBC QN AUTO: 22.4 PG (ref 26.8–34.3)
MCHC RBC AUTO-ENTMCNC: 30.4 G/DL (ref 31.4–37.4)
MCV RBC AUTO: 74 FL (ref 82–98)
MONOCYTES # BLD AUTO: 0.61 THOUSAND/ΜL (ref 0.17–1.22)
MONOCYTES NFR BLD AUTO: 11 % (ref 4–12)
NEUTROPHILS # BLD AUTO: 2.44 THOUSANDS/ΜL (ref 1.85–7.62)
NEUTS SEG NFR BLD AUTO: 46 % (ref 43–75)
NRBC BLD AUTO-RTO: 0 /100 WBCS
PLATELET # BLD AUTO: 205 THOUSANDS/UL (ref 149–390)
PMV BLD AUTO: 9.9 FL (ref 8.9–12.7)
POTASSIUM SERPL-SCNC: 4.2 MMOL/L (ref 3.5–5.3)
PROT SERPL-MCNC: 7.7 G/DL (ref 6.4–8.4)
RBC # BLD AUTO: 5.44 MILLION/UL (ref 3.88–5.62)
SODIUM SERPL-SCNC: 139 MMOL/L (ref 135–147)
WBC # BLD AUTO: 5.4 THOUSAND/UL (ref 4.31–10.16)

## 2025-01-21 PROCEDURE — 36415 COLL VENOUS BLD VENIPUNCTURE: CPT

## 2025-01-21 PROCEDURE — 99285 EMERGENCY DEPT VISIT HI MDM: CPT | Performed by: EMERGENCY MEDICINE

## 2025-01-21 PROCEDURE — 93005 ELECTROCARDIOGRAM TRACING: CPT

## 2025-01-21 PROCEDURE — 83690 ASSAY OF LIPASE: CPT

## 2025-01-21 PROCEDURE — 80053 COMPREHEN METABOLIC PANEL: CPT

## 2025-01-21 PROCEDURE — 99284 EMERGENCY DEPT VISIT MOD MDM: CPT

## 2025-01-21 PROCEDURE — 85379 FIBRIN DEGRADATION QUANT: CPT | Performed by: EMERGENCY MEDICINE

## 2025-01-21 PROCEDURE — 85025 COMPLETE CBC W/AUTO DIFF WBC: CPT

## 2025-01-21 PROCEDURE — 84484 ASSAY OF TROPONIN QUANT: CPT | Performed by: EMERGENCY MEDICINE

## 2025-01-21 PROCEDURE — 74177 CT ABD & PELVIS W/CONTRAST: CPT

## 2025-01-21 RX ADMIN — IOHEXOL 100 ML: 350 INJECTION, SOLUTION INTRAVENOUS at 21:36

## 2025-01-22 LAB
ATRIAL RATE: 65 BPM
P AXIS: 60 DEGREES
PR INTERVAL: 162 MS
QRS AXIS: -61 DEGREES
QRSD INTERVAL: 100 MS
QT INTERVAL: 430 MS
QTC INTERVAL: 447 MS
T WAVE AXIS: 27 DEGREES
VENTRICULAR RATE: 65 BPM

## 2025-01-22 PROCEDURE — 93010 ELECTROCARDIOGRAM REPORT: CPT | Performed by: INTERNAL MEDICINE

## 2025-01-22 NOTE — ED PROVIDER NOTES
Time reflects when diagnosis was documented in both MDM as applicable and the Disposition within this note       Time User Action Codes Description Comment    1/21/2025 11:05 PM Ivanna Villalba Add [R10.12] Left upper quadrant abdominal pain           ED Disposition       ED Disposition   Discharge    Condition   Stable    Date/Time   Tue Jan 21, 2025 11:05 PM    Comment   Altaf Thurston discharge to home/self care.                   Assessment & Plan   {Hyperlinks  Risk Stratification - NIHSS - HEART SCORE - Fill out sepsis note and make sure you call [id number] if severe or septic shock:[id number]}    Medical Decision Making  Amount and/or Complexity of Data Reviewed  Labs: ordered.  Radiology: ordered.    Risk  Prescription drug management.        ED Course as of 01/21/25 2324 Tue Jan 21, 2025 1952 Procedure Note: EKG  Date/Time: 01/21/25 7:52 PM   Interpreted by: Ivanna Villalba D.O.  Indications / Diagnosis: LUQ abdominal pain  ECG reviewed by me, the ED Provider: yes   The EKG demonstrates:  Rhythm: normal sinus  Intervals: normal intervals  Axis: left axis  QRS/Blocks: LAFB  ST Changes: No acute ST Changes, no STD/ANTONELLA.       Medications   iohexol (OMNIPAQUE) 350 MG/ML injection (MULTI-DOSE) 100 mL (100 mL Intravenous Given 1/21/25 2136)       ED Risk Strat Scores                  Identification of Seniors at Risk      Flowsheet Row Most Recent Value   (ISAR) Identification of Seniors at Risk    Before the illness or injury that brought you to the Emergency, did you need someone to help you on a regular basis? 0 Filed at: 01/21/2025 1638   In the last 24 hours, have you needed more help than usual? 0 Filed at: 01/21/2025 1638   Have you been hospitalized for one or more nights during the past 6 months? 0 Filed at: 01/21/2025 1638   In general, do you see well? 0 Filed at: 01/21/2025 1638   In general, do you have serious problems with your memory? 0 Filed at: 01/21/2025 1638   Do you take more than three different  medications every day? 1 Filed at: 01/21/2025 1638   ISAR Score 1 Filed at: 01/21/2025 1638                SBIRT 22yo+      Flowsheet Row Most Recent Value   Initial Alcohol Screen: US AUDIT-C     1. How often do you have a drink containing alcohol? 0 Filed at: 01/21/2025 1638   2. How many drinks containing alcohol do you have on a typical day you are drinking?  0 Filed at: 01/21/2025 1638   3a. Male UNDER 65: How often do you have five or more drinks on one occasion? 0 Filed at: 01/21/2025 1638   3b. FEMALE Any Age, or MALE 65+: How often do you have 4 or more drinks on one occassion? 0 Filed at: 01/21/2025 1638   Audit-C Score 0 Filed at: 01/21/2025 1638   RENZO: How many times in the past year have you...    Used an illegal drug or used a prescription medication for non-medical reasons? Never Filed at: 01/21/2025 1638                            History of Present Illness   {Hyperlinks  History (Med, Surg, Fam, Social) - Current Medications - Allergies  :[id number]}    Chief Complaint   Patient presents with    Abdominal Pain     Pt dx with diverticulitis hx, has LLQ abd pain thats radiating to LUQ        Past Medical History:   Diagnosis Date    Asthma     BPH (benign prostatic hyperplasia)     Dental disease     Diabetes mellitus (HCC)     GERD (gastroesophageal reflux disease)     Hypertension       Past Surgical History:   Procedure Laterality Date    COLONOSCOPY      HERNIA REPAIR      SUBMANDIBULAR DUCT STONE EXCISION N/A 7/12/2023    Procedure: PAROTID SIALOENDOSCOPY WITH WASHOUT;  Surgeon: Angel Dasilva MD;  Location: [facility];  Service: ENT    TONSILLECTOMY      UPPER GASTROINTESTINAL ENDOSCOPY        Family History   Problem Relation Age of Onset    Lupus Mother     Heart failure Father     Crohn's disease Sister       Social History     Tobacco Use    Smoking status: Former     Current packs/day: 0.00     Average packs/day: 0.3 packs/day for 27.6 years (6.9 ttl pk-yrs)     Types: Cigarettes      Start date: 1962     Quit date: 3/5/1990     Years since quittin.9    Smokeless tobacco: Never   Vaping Use    Vaping status: Never Used   Substance Use Topics    Alcohol use: Not Currently     Comment: Clean & Sober    Drug use: Not Currently     Types: Cocaine     Comment: Clean & Sober 25 yrs      E-Cigarette/Vaping    E-Cigarette Use Never User       E-Cigarette/Vaping Substances    Nicotine No       I have reviewed and agree with the history as documented.     79-year-old male with a past medical history of hypertension, diabetes, and diverticulosis with previous episodes of diverticulitis presents for evaluation with left upper quadrant abdominal pain.  Patient states that symptoms been ongoing for several days now.  He is concerned that this may be a repeat episode of diverticulitis.  He also states that he has been taking Metamucil to help prevent recurrent episodes of diverticulitis, is unsure if this is causing him discomfort.  Patient denies any associated fevers, chest pain, shortness of breath, constipation, diarrhea, vomiting, or other concerning symptoms.        Review of Systems   Constitutional:  Negative for fever.   Respiratory:  Negative for shortness of breath.    Gastrointestinal:  Positive for abdominal pain. Negative for constipation, diarrhea and vomiting.   All other systems reviewed and are negative.          Objective   {Hyperlinks  Historical Vitals - Historical Labs - Chart Review/Microbiology - Last Echo - Code Status  :[id number]}    ED Triage Vitals   Temperature Pulse Blood Pressure Respirations SpO2 Patient Position - Orthostatic VS   25 1635 25 1635 25 1635 25 1635 25 1635 25 1635   98 °F (36.7 °C) 93 163/81 18 100 % Sitting      Temp Source Heart Rate Source BP Location FiO2 (%) Pain Score    25 1635 25 1635 25 1635 -- 25 2300    Temporal Monitor Left arm  1      Vitals      Date and Time Temp Pulse SpO2 Resp  BP Pain Score FACES Pain Rating User   01/21/25 2300 -- 67 98 % 19 156/80 1 -- SH   01/21/25 2100 -- 64 98 % 16 140/78 -- -- SL   01/21/25 1955 -- 70 100 % 18 177/89 -- -- BS   01/21/25 1635 98 °F (36.7 °C) 93 100 % 18 163/81 -- -- RO            Physical Exam  Vitals and nursing note reviewed.   Constitutional:       General: He is awake. He is not in acute distress.     Appearance: He is not toxic-appearing.   HENT:      Head: Normocephalic and atraumatic.   Eyes:      General: Vision grossly intact. Gaze aligned appropriately.   Cardiovascular:      Rate and Rhythm: Normal rate and regular rhythm.      Heart sounds: Normal heart sounds.   Pulmonary:      Effort: Pulmonary effort is normal. No respiratory distress.      Breath sounds: Normal breath sounds.   Chest:      Chest wall: No tenderness.   Abdominal:      Palpations: Abdomen is soft.      Tenderness: There is no abdominal tenderness.   Musculoskeletal:      Cervical back: Full passive range of motion without pain and neck supple.   Skin:     General: Skin is warm and dry.   Neurological:      General: No focal deficit present.      Mental Status: He is alert and oriented to person, place, and time.         Results Reviewed       Procedure Component Value Units Date/Time    HS Troponin I 2hr [[id number]] Collected: 01/21/25 2154    Lab Status: Final result Specimen: Blood from Arm, Right Updated: 01/21/25 2220     hs TnI 2hr <2 ng/L      Delta 2hr hsTnI --    D-dimer, quantitative [[id number]]  (Normal) Collected: 01/21/25 1952    Lab Status: Final result Specimen: Blood from Arm, Left Updated: 01/21/25 2037     D-Dimer, Quant <0.27 ug/ml FEU     Narrative:      In the evaluation for possible pulmonary embolism, in the appropriate (Well's Score of 4 or less) patient, the age adjusted d-dimer cutoff for this patient can be calculated as:    Age x 0.01 (in ug/mL) for Age-adjusted D-dimer exclusion threshold for a patient over 50 years.    HS Troponin 0hr  (reflex protocol) [[id number]]  (Normal) Collected: 01/21/25 1952    Lab Status: Final result Specimen: Blood from Arm, Left Updated: 01/21/25 2024     hs TnI 0hr <2 ng/L     HS Troponin I 4hr [[id number]]     Lab Status: No result Specimen: Blood     Lipase [[id number]]  (Normal) Collected: 01/21/25 1641    Lab Status: Final result Specimen: Blood from Arm, Left Updated: 01/21/25 1707     Lipase 43 u/L     Comprehensive metabolic panel [[id number]]  (Abnormal) Collected: 01/21/25 1641    Lab Status: Final result Specimen: Blood from Arm, Left Updated: 01/21/25 1706     Sodium 139 mmol/L      Potassium 4.2 mmol/L      Chloride 106 mmol/L      CO2 30 mmol/L      ANION GAP 3 mmol/L      BUN 22 mg/dL      Creatinine 1.03 mg/dL      Glucose 92 mg/dL      Calcium 9.2 mg/dL      AST 31 U/L      ALT 59 U/L      Alkaline Phosphatase 78 U/L      Total Protein 7.7 g/dL      Albumin 4.3 g/dL      Total Bilirubin 0.40 mg/dL      eGFR 68 ml/min/1.73sq m     Narrative:      National Kid[employer] guidelines for Chronic Kidney Disease (CKD):     Stage 1 with normal or high GFR (GFR > 90 mL/min/1.73 square meters)    Stage 2 Mild CKD (GFR = 60-89 mL/min/1.73 square meters)    Stage 3A Moderate CKD (GFR = 45-59 mL/min/1.73 square meters)    Stage 3B Moderate CKD (GFR = 30-44 mL/min/1.73 square meters)    Stage 4 Severe CKD (GFR = 15-29 mL/min/1.73 square meters)    Stage 5 End Stage CKD (GFR <15 mL/min/1.73 square meters)  Note: GFR calculation is accurate only with a steady state creatinine    CBC and differential [[id number]]  (Abnormal) Collected: 01/21/25 1641    Lab Status: Final result Specimen: Blood from Arm, Left Updated: 01/21/25 1649     WBC 5.40 Thousand/uL      RBC 5.44 Million/uL      Hemoglobin 12.2 g/dL      Hematocrit 40.1 %      MCV 74 fL      MCH 22.4 pg      MCHC 30.4 g/dL      RDW 14.3 %      MPV 9.9 fL      Platelets 205 Thousands/uL      nRBC 0 /100 WBCs      Segmented % 46 %      Immature Grans % 0 %       Lymphocytes % 38 %      Monocytes % 11 %      Eosinophils Relative 5 %      Basophils Relative 0 %      Absolute Neutrophils 2.44 Thousands/µL      Absolute Immature Grans 0.01 Thousand/uL      Absolute Lymphocytes 2.03 Thousands/µL      Absolute Monocytes 0.61 Thousand/µL      Eosinophils Absolute 0.29 Thousand/µL      Basophils Absolute 0.02 Thousands/µL             CT abdomen pelvis with contrast   Final Interpretation by Brad Santiago DO ([id number])      No CT evidence of acute findings.         Workstation performed: [id number]             Procedures    ED Medication and Procedure Management   Prior to Admission Medications   Prescriptions Last Dose Informant Patient Reported? Taking?   CETIRIZINE HCL PO  Self Yes No   Sig: Take 1 tablet by mouth in the morning   Cholecalciferol 50 MCG (2000 UT) TABS  Self Yes No   Sig: TAKE ONE TABLET BY MOUTH EVERY DAY (FOR LOW VITAMIN D)   Coenzyme Q10 100 MG TABS  Self Yes No   Sig: TAKE 400MG  BY MOUTH EVERY MORNING   Docusate Sodium (DSS) 100 MG CAPS   Yes No   Si mg   Multiple Vitamin (MULTIVITAMINS PO)  Self Yes No   Sig: TAKE ONE CAP/TAB BY MOUTH EVERY DAY FOR SUPPLEMENTATION   Omega-3 Fatty Acids (fish oil) 1,000 mg   Yes No   Sig: Take 1,000 mg by mouth daily   acetaminophen (TYLENOL) 500 mg tablet   Yes No   Sig: Take 500 mg by mouth every 6 (six) hours as needed for mild pain   albuterol (PROVENTIL HFA,VENTOLIN HFA) 90 mcg/act inhaler  Self Yes No   Sig: Inhale 2 puffs every 6 (six) hours as needed for wheezing   alfuzosin (UROXATRAL) 10 mg 24 hr tablet   Yes No   Sig: Take 10 mg by mouth daily   amLODIPine (NORVASC) 5 mg tablet   No No   Sig: Take 1 tablet (5 mg total) by mouth daily   aspirin (ECOTRIN LOW STRENGTH) 81 mg EC tablet  Self Yes No   Si mg daily   atorvastatin (LIPITOR) 40 mg tablet   Yes No   Sig: TAKE 20MG (ONE-HALF TABLET) BY MOUTH EVERY DAY FOR HYPERLIPIDEMIA   budesonide-formoterol (SYMBICORT) 160-4.5 mcg/act inhaler  Self Yes No    Sig: Inhale 2 puffs 2 (two) times a day Rinse mouth after use.   chlorhexidine (PERIDEX) 0.12 % solution  Self No No   Sig: Apply 15 mL to the mouth or throat 4 (four) times a day (with meals and at bedtime) After meals and before bed   famotidine (PEPCID) 20 mg tablet   No No   Sig: Take 1 tablet (20 mg total) by mouth 2 (two) times a day for 5 days   ferrous gluconate (FERGON) 324 mg tablet  Self Yes No   Sig: Take 324 mg by mouth daily with breakfast   ferrous sulfate 325 (65 Fe) mg tablet   Yes No   Si mg   finasteride (PROSCAR) 5 mg tablet  Self Yes No   Sig: Take 5 mg by mouth daily   fluticasone (FLONASE) 50 mcg/act nasal spray  Self Yes No   Sig: INSTILL 2 SPRAYS IN NOSTRIL(S) EVERY DAY AS NEEDED FOR NASAL CONGESTION   ibuprofen (MOTRIN) 800 mg tablet   No No   Sig: Take 1 tablet (800 mg total) by mouth 3 (three) times a day   ketoconazole (NIZORAL) 2 % cream  Self Yes No   lactulose 20 g/30 mL   No No   Sig: Take 30 mL (20 g total) by mouth daily for 3 days   latanoprost (XALATAN) 0.005 % ophthalmic solution  Self Yes No   Sig: INSTILL 1 DROP IN BOTH EYES AT BEDTIME FOR GLAUCOMA-PROTECT BOTTLE FROM LIGHT.   lisinopril (ZESTRIL) 5 mg tablet  Self Yes No   Sig: Take 5 mg by mouth   magnesium gluconate (MAGONATE) 500 mg tablet   Yes No   Sig: Take 500 mg by mouth in the morning   metFORMIN (GLUCOPHAGE) 500 mg tablet  Self Yes No   Sig: Take 500 mg by mouth daily with breakfast   omeprazole (PriLOSEC) 20 mg delayed release capsule  Self Yes No   Si mg   oxyCODONE-acetaminophen (PERCOCET) 5-325 mg per tablet   Yes No   Sig: TAKE 1 TABLET BY MOUTH EVERY 6 HOURS AS NEEDED FOR PAIN. RX MUST LAST UNTIL 2023.      Facility-Administered Medications: None     Discharge Medication List as of 2025 11:05 PM        CONTINUE these medications which have NOT CHANGED    Details   acetaminophen (TYLENOL) 500 mg tablet Take 500 mg by mouth every 6 (six) hours as needed for mild pain, Historical Med       albuterol (PROVENTIL HFA,VENTOLIN HFA) 90 mcg/act inhaler Inhale 2 puffs every 6 (six) hours as needed for wheezing, Historical Med      alfuzosin (UROXATRAL) 10 mg 24 hr tablet Take 10 mg by mouth daily, Historical Med      amLODIPine (NORVASC) 5 mg tablet Take 1 tablet (5 mg total) by mouth daily, Starting Tue 10/1/2024, Until Thu 10/31/2024, Normal      aspirin (ECOTRIN LOW STRENGTH) 81 mg EC tablet 81 mg daily, Starting Thu 1/20/2022, Historical Med      atorvastatin (LIPITOR) 40 mg tablet TAKE 20MG (ONE-HALF TABLET) BY MOUTH EVERY DAY FOR HYPERLIPIDEMIA, Historical Med      budesonide-formoterol (SYMBICORT) 160-4.5 mcg/act inhaler Inhale 2 puffs 2 (two) times a day Rinse mouth after use., Historical Med      CETIRIZINE HCL PO Take 1 tablet by mouth in the morning, Historical Med      chlorhexidine (PERIDEX) 0.12 % solution Apply 15 mL to the mouth or throat 4 (four) times a day (with meals and at bedtime) After meals and before bed, Starting Sat 2/19/2022, Print      Cholecalciferol 50 MCG (2000 UT) TABS TAKE ONE TABLET BY MOUTH EVERY DAY (FOR LOW VITAMIN D), Historical Med      Coenzyme Q10 100 MG TABS TAKE 400MG  BY MOUTH EVERY MORNING, Historical Med      Docusate Sodium (DSS) 100 MG CAPS 100 mg, Starting Fri 6/9/2023, Historical Med      famotidine (PEPCID) 20 mg tablet Take 1 tablet (20 mg total) by mouth 2 (two) times a day for 5 days, Starting Tue 11/19/2024, Until Sun 11/24/2024, Normal      ferrous gluconate (FERGON) 324 mg tablet Take 324 mg by mouth daily with breakfast, Historical Med      ferrous sulfate 325 (65 Fe) mg tablet 325 mg, Starting Wed 5/31/2023, Historical Med      finasteride (PROSCAR) 5 mg tablet Take 5 mg by mouth daily, Starting Thu 1/6/2022, Historical Med      fluticasone (FLONASE) 50 mcg/act nasal spray INSTILL 2 SPRAYS IN NOSTRIL(S) EVERY DAY AS NEEDED FOR NASAL CONGESTION, Historical Med      ibuprofen (MOTRIN) 800 mg tablet Take 1 tablet (800 mg total) by mouth 3 (three)  times a day, Starting Sat 12/9/2023, Normal      ketoconazole (NIZORAL) 2 % cream Historical Med      lactulose 20 g/30 mL Take 30 mL (20 g total) by mouth daily for 3 days, Starting Sat 8/12/2023, Until Tue 8/15/2023, Normal      latanoprost (XALATAN) 0.005 % ophthalmic solution INSTILL 1 DROP IN BOTH EYES AT BEDTIME FOR GLAUCOMA-PROTECT BOTTLE FROM LIGHT., Historical Med      lisinopril (ZESTRIL) 5 mg tablet Take 5 mg by mouth, Starting Thu 1/6/2022, Historical Med      magnesium gluconate (MAGONATE) 500 mg tablet Take 500 mg by mouth in the morning, Historical Med      metFORMIN (GLUCOPHAGE) 500 mg tablet Take 500 mg by mouth daily with breakfast, Starting Thu 1/6/2022, Historical Med      Multiple Vitamin (MULTIVITAMINS PO) TAKE ONE CAP/TAB BY MOUTH EVERY DAY FOR SUPPLEMENTATION, Historical Med      Omega-3 Fatty Acids (fish oil) 1,000 mg Take 1,000 mg by mouth daily, Historical Med      omeprazole (PriLOSEC) 20 mg delayed release capsule 20 mg, Starting Mon 4/11/2022, Historical Med      oxyCODONE-acetaminophen (PERCOCET) 5-325 mg per tablet TAKE 1 TABLET BY MOUTH EVERY 6 HOURS AS NEEDED FOR PAIN. RX MUST LAST UNTIL 6/17/2023., Historical Med           No discharge procedures on file.  ED SEPSIS DOCUMENTATION   Time reflects when diagnosis was documented in both MDM as applicable and the Disposition within this note       Time User Action Codes Description Comment    1/21/2025 11:05 PM Ivanna Villalba Add [R10.12] Left upper quadrant abdominal pain                documented in both MDM as applicable and the Disposition within this note       Time User Action Codes Description Comment    1/21/2025 11:05 PM Ivanna Villalba Add [R10.12] Left upper quadrant abdominal pain                  Ivanna Villalba, DO  02/09/25 3712

## 2025-02-11 ENCOUNTER — HOSPITAL ENCOUNTER (EMERGENCY)
Facility: HOSPITAL | Age: 80
Discharge: HOME | End: 2025-02-11
Attending: EMERGENCY MEDICINE
Payer: COMMERCIAL

## 2025-02-11 VITALS
DIASTOLIC BLOOD PRESSURE: 79 MMHG | SYSTOLIC BLOOD PRESSURE: 153 MMHG | HEART RATE: 94 BPM | RESPIRATION RATE: 18 BRPM | TEMPERATURE: 97.5 F | OXYGEN SATURATION: 100 %

## 2025-02-11 DIAGNOSIS — E86.0 DEHYDRATION: Primary | ICD-10-CM

## 2025-02-11 LAB
ALBUMIN SERPL BCG-MCNC: 4.2 G/DL (ref 3.5–5)
ALP SERPL-CCNC: 66 U/L (ref 34–104)
ALT SERPL W P-5'-P-CCNC: 29 U/L (ref 7–52)
ANION GAP SERPL CALCULATED.3IONS-SCNC: 7 MMOL/L (ref 4–13)
AST SERPL W P-5'-P-CCNC: 29 U/L (ref 13–39)
BASOPHILS # BLD AUTO: 0.03 THOUSANDS/ΜL (ref 0–0.1)
BASOPHILS NFR BLD AUTO: 1 % (ref 0–1)
BILIRUB DIRECT SERPL-MCNC: 0.12 MG/DL (ref 0–0.2)
BILIRUB SERPL-MCNC: 0.52 MG/DL (ref 0.2–1)
BUN SERPL-MCNC: 28 MG/DL (ref 5–25)
CALCIUM SERPL-MCNC: 9.3 MG/DL (ref 8.4–10.2)
CHLORIDE SERPL-SCNC: 102 MMOL/L (ref 96–108)
CO2 SERPL-SCNC: 26 MMOL/L (ref 21–32)
CREAT SERPL-MCNC: 1.15 MG/DL (ref 0.6–1.3)
EOSINOPHIL # BLD AUTO: 0.2 THOUSAND/ΜL (ref 0–0.61)
EOSINOPHIL NFR BLD AUTO: 4 % (ref 0–6)
ERYTHROCYTE [DISTWIDTH] IN BLOOD BY AUTOMATED COUNT: 13.5 % (ref 11.6–15.1)
GFR SERPL CREATININE-BSD FRML MDRD: 59 ML/MIN/1.73SQ M
GLUCOSE SERPL-MCNC: 95 MG/DL (ref 65–140)
HCT VFR BLD AUTO: 40.5 % (ref 36.5–49.3)
HGB BLD-MCNC: 12.6 G/DL (ref 12–17)
IMM GRANULOCYTES # BLD AUTO: 0.01 THOUSAND/UL (ref 0–0.2)
IMM GRANULOCYTES NFR BLD AUTO: 0 % (ref 0–2)
INR PPP: 0.91 (ref 0.85–1.19)
LIPASE SERPL-CCNC: 42 U/L (ref 11–82)
LYMPHOCYTES # BLD AUTO: 1.68 THOUSANDS/ΜL (ref 0.6–4.47)
LYMPHOCYTES NFR BLD AUTO: 37 % (ref 14–44)
MCH RBC QN AUTO: 22.5 PG (ref 26.8–34.3)
MCHC RBC AUTO-ENTMCNC: 31.1 G/DL (ref 31.4–37.4)
MCV RBC AUTO: 73 FL (ref 82–98)
MONOCYTES # BLD AUTO: 0.54 THOUSAND/ΜL (ref 0.17–1.22)
MONOCYTES NFR BLD AUTO: 12 % (ref 4–12)
NEUTROPHILS # BLD AUTO: 2.07 THOUSANDS/ΜL (ref 1.85–7.62)
NEUTS SEG NFR BLD AUTO: 46 % (ref 43–75)
NRBC BLD AUTO-RTO: 0 /100 WBCS
PLATELET # BLD AUTO: 188 THOUSANDS/UL (ref 149–390)
PMV BLD AUTO: 9.5 FL (ref 8.9–12.7)
POTASSIUM SERPL-SCNC: 4 MMOL/L (ref 3.5–5.3)
PROT SERPL-MCNC: 7.6 G/DL (ref 6.4–8.4)
PROTHROMBIN TIME: 12.9 SECONDS (ref 12.3–15)
RBC # BLD AUTO: 5.59 MILLION/UL (ref 3.88–5.62)
SODIUM SERPL-SCNC: 135 MMOL/L (ref 135–147)
WBC # BLD AUTO: 4.53 THOUSAND/UL (ref 4.31–10.16)

## 2025-02-11 PROCEDURE — 36415 COLL VENOUS BLD VENIPUNCTURE: CPT | Performed by: EMERGENCY MEDICINE

## 2025-02-11 PROCEDURE — 99282 EMERGENCY DEPT VISIT SF MDM: CPT

## 2025-02-11 PROCEDURE — 85610 PROTHROMBIN TIME: CPT | Performed by: EMERGENCY MEDICINE

## 2025-02-11 PROCEDURE — 80076 HEPATIC FUNCTION PANEL: CPT | Performed by: EMERGENCY MEDICINE

## 2025-02-11 PROCEDURE — 83690 ASSAY OF LIPASE: CPT | Performed by: EMERGENCY MEDICINE

## 2025-02-11 PROCEDURE — 99284 EMERGENCY DEPT VISIT MOD MDM: CPT | Performed by: EMERGENCY MEDICINE

## 2025-02-11 PROCEDURE — 85025 COMPLETE CBC W/AUTO DIFF WBC: CPT | Performed by: EMERGENCY MEDICINE

## 2025-02-11 PROCEDURE — 80048 BASIC METABOLIC PNL TOTAL CA: CPT | Performed by: EMERGENCY MEDICINE

## 2025-02-11 NOTE — DISCHARGE INSTRUCTIONS
Increase liquids  Follow-up with your provider or with our provider  Return worsening symptoms or any problems   Statement Selected

## 2025-02-11 NOTE — ED PROVIDER NOTES
Time reflects when diagnosis was documented in both MDM as applicable and the Disposition within this note       Time User Action Codes Description Comment    2/11/2025  2:13 PM Michael Garibay Add [E86.0] Dehydration           ED Disposition       ED Disposition   Discharge    Condition   Stable    Date/Time   Tue Feb 11, 2025  2:13 PM    Comment   Altaf Bertrand discharge to home/self care.                   Assessment & Plan       Medical Decision Making  Amount and/or Complexity of Data Reviewed  Labs: ordered.      Decision making 79-year-old male presents emergency department with some vague complaints has a rash on his head we will continue to use antifungal medication for that antidandruff medication.  He reports some shakiness in his fingers bilaterally and some muscle spasm.  Patient did workout yesterday he has an elevated blood urea nitrogen.  Discussed possibility dehydration.  Patient reports he will orally hydrate at home.  No other symptoms currently patient is asymptomatic.  We discussed outpatient treatment and follow-up we discussed indications to return.       Medications - No data to display    ED Risk Strat Scores          Diagnostic testing electrolytes were within normal limits other than a BUN of 28 and creatinine 1.15 consistent with some possible dehydration  Electrolytes are within normal limits  Lipase was normal no sign of pancreatitis.  White count was normal 4.5 no sign of inflammation hemoglobin is normal at 12.7 no sign of anemia.                  SBIRT 20yo+      Flowsheet Row Most Recent Value   Initial Alcohol Screen: US AUDIT-C     1. How often do you have a drink containing alcohol? 0 Filed at: 02/11/2025 1423   2. How many drinks containing alcohol do you have on a typical day you are drinking?  0 Filed at: 02/11/2025 1423   3b. FEMALE Any Age, or MALE 65+: How often do you have 4 or more drinks on one occassion? 0 Filed at: 02/11/2025 1423   Audit-C Score 0 Filed at:  2025   RENZO: How many times in the past year have you...    Used an illegal drug or used a prescription medication for non-medical reasons? Never Filed at: 2025                            History of Present Illness       Chief Complaint   Patient presents with    Itching     Pt arrives with multiple vague complaints. States he used new dandruff shampoo this am, shortly thereafter became itchy on his back and abdomen, then began with abd cramping, cramping in hands, and jittery on and off.        Past Medical History:   Diagnosis Date    Asthma     BPH (benign prostatic hyperplasia)     Dental disease     Diabetes mellitus (HCC)     GERD (gastroesophageal reflux disease)     Hypertension       Past Surgical History:   Procedure Laterality Date    COLONOSCOPY      HERNIA REPAIR      SUBMANDIBULAR DUCT STONE EXCISION N/A 2023    Procedure: PAROTID SIALOENDOSCOPY WITH WASHOUT;  Surgeon: Angel Dasilva MD;  Location: [facility];  Service: ENT    TONSILLECTOMY      UPPER GASTROINTESTINAL ENDOSCOPY        Family History   Problem Relation Age of Onset    Lupus Mother     Heart failure Father     Crohn's disease Sister       Social History     Tobacco Use    Smoking status: Former     Current packs/day: 0.00     Average packs/day: 0.3 packs/day for 27.6 years (6.9 ttl pk-yrs)     Types: Cigarettes     Start date: 1962     Quit date: 3/5/1990     Years since quittin.9    Smokeless tobacco: Never   Vaping Use    Vaping status: Never Used   Substance Use Topics    Alcohol use: Not Currently     Comment: Clean & Sober    Drug use: Not Currently     Types: Cocaine     Comment: Clean & Sober 25 yrs      E-Cigarette/Vaping    E-Cigarette Use Never User       E-Cigarette/Vaping Substances    Nicotine No       I have reviewed and agree with the history as documented.     HPI patient is an 79-year-old male who presents to the emergency department with several somewhat vague complaints.  Apparently  has a spot on his head which sometimes is flaking and he reports he is using an antifungal medication.  He reports that that area seems somewhat flaky today.  Patient reports that he bent his leg up and felt a sudden cramp in his abdominal wall.  He reports that his hands seem to be crampy and jittery off and on.  Patient worked out yesterday he reports he may become dehydrated.  He is somewhat concerned about his liver and electrolyte issues.  He denies any vomiting or diarrhea.  There is no chest or abdominal pain.  Patient has no symptoms currently.  He reports that the cramping in his hands has been completely resolved it was just intermittent in the past.  He reports several episodes over the last 24 hours after working out.  Past medical history of asthma diabetes, hypertension  Family history noncontributory    social history, former smoker no history of drug abuse    Review of Systems   Constitutional:  Negative for fever.   HENT:  Negative for congestion.    Eyes:  Negative for pain and redness.   Respiratory:  Negative for cough and shortness of breath.    Cardiovascular:  Negative for chest pain.   Gastrointestinal:  Negative for abdominal pain and vomiting.   Skin:  Positive for rash.   Reports hand cramping        Objective       ED Triage Vitals [02/11/25 1308]   Temperature Pulse Blood Pressure Respirations SpO2 Patient Position - Orthostatic VS   97.5 °F (36.4 °C) 94 153/79 18 100 % Sitting      Temp Source Heart Rate Source BP Location FiO2 (%) Pain Score    Temporal Monitor Left arm -- --      Vitals      Date and Time Temp Pulse SpO2 Resp BP Pain Score FACES Pain Rating User   02/11/25 1308 97.5 °F (36.4 °C) 94 100 % 18 153/79 -- -- KW            Physical Exam  Vitals and nursing note reviewed.   Constitutional:       Appearance: He is well-developed.   HENT:      Head: Normocephalic.      Right Ear: External ear normal.      Left Ear: External ear normal.      Nose: Nose normal.       Mouth/Throat:      Mouth: Mucous membranes are moist.      Pharynx: Oropharynx is clear.   Eyes:      General: Lids are normal.      Extraocular Movements: Extraocular movements intact.      Pupils: Pupils are equal, round, and reactive to light.   Cardiovascular:      Rate and Rhythm: Normal rate and regular rhythm.      Pulses: Normal pulses.      Heart sounds: Normal heart sounds.   Pulmonary:      Effort: Pulmonary effort is normal. No respiratory distress.      Breath sounds: Normal breath sounds.   Abdominal:      General: Abdomen is flat. Bowel sounds are normal.      Tenderness: There is abdominal tenderness.   Musculoskeletal:         General: No deformity. Normal range of motion.      Cervical back: Normal range of motion and neck supple.   Skin:     General: Skin is warm and dry.      Comments: There is a small circular rash on the patient's head consistent with a small area of possible tinea.  There is no redness and warmth there is no sign of cellulitis.  There is no sign of abscess.   Neurological:      General: No focal deficit present.      Mental Status: He is alert and oriented to person, place, and time.   Psychiatric:         Mood and Affect: Mood normal.         Results Reviewed       Procedure Component Value Units Date/Time    Basic metabolic panel [[id number]]  (Abnormal) Collected: 02/11/25 1343    Lab Status: Final result Specimen: Blood from Arm, Right Updated: 02/11/25 1408     Sodium 135 mmol/L      Potassium 4.0 mmol/L      Chloride 102 mmol/L      CO2 26 mmol/L      ANION GAP 7 mmol/L      BUN 28 mg/dL      Creatinine 1.15 mg/dL      Glucose 95 mg/dL      Calcium 9.3 mg/dL      eGFR 59 ml/min/1.73sq m     Narrative:      [employer] guidelines for Chronic Kidney Disease (CKD):     Stage 1 with normal or high GFR (GFR > 90 mL/min/1.73 square meters)    Stage 2 Mild CKD (GFR = 60-89 mL/min/1.73 square meters)    Stage 3A Moderate CKD (GFR = 45-59 mL/min/1.73 square  meters)    Stage 3B Moderate CKD (GFR = 30-44 mL/min/1.73 square meters)    Stage 4 Severe CKD (GFR = 15-29 mL/min/1.73 square meters)    Stage 5 End Stage CKD (GFR <15 mL/min/1.73 square meters)  Note: GFR calculation is accurate only with a steady state creatinine    Hepatic function panel [[id number]]  (Normal) Collected: 02/11/25 1343    Lab Status: Final result Specimen: Blood from Arm, Right Updated: 02/11/25 1408     Total Bilirubin 0.52 mg/dL      Bilirubin, Direct 0.12 mg/dL      Alkaline Phosphatase 66 U/L      AST 29 U/L      ALT 29 U/L      Total Protein 7.6 g/dL      Albumin 4.2 g/dL     Lipase [[id number]]  (Normal) Collected: 02/11/25 1343    Lab Status: Final result Specimen: Blood from Arm, Right Updated: 02/11/25 1408     Lipase 42 u/L     Protime-INR [[id number]]  (Normal) Collected: 02/11/25 1343    Lab Status: Final result Specimen: Blood from Arm, Right Updated: 02/11/25 1402     Protime 12.9 seconds      INR 0.91    Narrative:      INR Therapeutic Range    Indication                                             INR Range      Atrial Fibrillation                                               2.0-3.0  Hypercoagulable State                                    2.0.2.3  Left Ventricular Asist Device                            2.0-3.0  Mechanical Heart Valve                                  -    Aortic(with afib, MI, embolism, HF, LA enlargement,    and/or coagulopathy)                                     2.0-3.0 (2.5-3.5)     Mitral                                                             2.5-3.5  Prosthetic/Bioprosthetic Heart Valve               2.0-3.0  Venous thromboembolism (VTE: VT, PE        2.0-3.0    CBC and differential [[id number]]  (Abnormal) Collected: 02/11/25 1343    Lab Status: Final result Specimen: Blood from Arm, Right Updated: 02/11/25 1350     WBC 4.53 Thousand/uL      RBC 5.59 Million/uL      Hemoglobin 12.6 g/dL      Hematocrit 40.5 %      MCV 73 fL      MCH 22.5 pg      MCHC  31.1 g/dL      RDW 13.5 %      MPV 9.5 fL      Platelets 188 Thousands/uL      nRBC 0 /100 WBCs      Segmented % 46 %      Immature Grans % 0 %      Lymphocytes % 37 %      Monocytes % 12 %      Eosinophils Relative 4 %      Basophils Relative 1 %      Absolute Neutrophils 2.07 Thousands/µL      Absolute Immature Grans 0.01 Thousand/uL      Absolute Lymphocytes 1.68 Thousands/µL      Absolute Monocytes 0.54 Thousand/µL      Eosinophils Absolute 0.20 Thousand/µL      Basophils Absolute 0.03 Thousands/µL             No orders to display       Procedures    ED Medication and Procedure Management   Prior to Admission Medications   Prescriptions Last Dose Informant Patient Reported? Taking?   CETIRIZINE HCL PO  Self Yes No   Sig: Take 1 tablet by mouth in the morning   Cholecalciferol 50 MCG (2000 UT) TABS  Self Yes No   Sig: TAKE ONE TABLET BY MOUTH EVERY DAY (FOR LOW VITAMIN D)   Coenzyme Q10 100 MG TABS  Self Yes No   Sig: TAKE 400MG  BY MOUTH EVERY MORNING   Docusate Sodium (DSS) 100 MG CAPS   Yes No   Si mg   Multiple Vitamin (MULTIVITAMINS PO)  Self Yes No   Sig: TAKE ONE CAP/TAB BY MOUTH EVERY DAY FOR SUPPLEMENTATION   Omega-3 Fatty Acids (fish oil) 1,000 mg   Yes No   Sig: Take 1,000 mg by mouth daily   acetaminophen (TYLENOL) 500 mg tablet   Yes No   Sig: Take 500 mg by mouth every 6 (six) hours as needed for mild pain   albuterol (PROVENTIL HFA,VENTOLIN HFA) 90 mcg/act inhaler  Self Yes No   Sig: Inhale 2 puffs every 6 (six) hours as needed for wheezing   alfuzosin (UROXATRAL) 10 mg 24 hr tablet   Yes No   Sig: Take 10 mg by mouth daily   amLODIPine (NORVASC) 5 mg tablet   No No   Sig: Take 1 tablet (5 mg total) by mouth daily   aspirin (ECOTRIN LOW STRENGTH) 81 mg EC tablet  Self Yes No   Si mg daily   atorvastatin (LIPITOR) 40 mg tablet   Yes No   Sig: TAKE 20MG (ONE-HALF TABLET) BY MOUTH EVERY DAY FOR HYPERLIPIDEMIA   budesonide-formoterol (SYMBICORT) 160-4.5 mcg/act inhaler  Self Yes No   Sig:  Inhale 2 puffs 2 (two) times a day Rinse mouth after use.   chlorhexidine (PERIDEX) 0.12 % solution  Self No No   Sig: Apply 15 mL to the mouth or throat 4 (four) times a day (with meals and at bedtime) After meals and before bed   famotidine (PEPCID) 20 mg tablet   No No   Sig: Take 1 tablet (20 mg total) by mouth 2 (two) times a day for 5 days   ferrous gluconate (FERGON) 324 mg tablet  Self Yes No   Sig: Take 324 mg by mouth daily with breakfast   ferrous sulfate 325 (65 Fe) mg tablet   Yes No   Si mg   finasteride (PROSCAR) 5 mg tablet  Self Yes No   Sig: Take 5 mg by mouth daily   fluticasone (FLONASE) 50 mcg/act nasal spray  Self Yes No   Sig: INSTILL 2 SPRAYS IN NOSTRIL(S) EVERY DAY AS NEEDED FOR NASAL CONGESTION   ibuprofen (MOTRIN) 800 mg tablet   No No   Sig: Take 1 tablet (800 mg total) by mouth 3 (three) times a day   ketoconazole (NIZORAL) 2 % cream  Self Yes No   lactulose 20 g/30 mL   No No   Sig: Take 30 mL (20 g total) by mouth daily for 3 days   latanoprost (XALATAN) 0.005 % ophthalmic solution  Self Yes No   Sig: INSTILL 1 DROP IN BOTH EYES AT BEDTIME FOR GLAUCOMA-PROTECT BOTTLE FROM LIGHT.   lisinopril (ZESTRIL) 5 mg tablet  Self Yes No   Sig: Take 5 mg by mouth   magnesium gluconate (MAGONATE) 500 mg tablet   Yes No   Sig: Take 500 mg by mouth in the morning   metFORMIN (GLUCOPHAGE) 500 mg tablet  Self Yes No   Sig: Take 500 mg by mouth daily with breakfast   omeprazole (PriLOSEC) 20 mg delayed release capsule  Self Yes No   Si mg   oxyCODONE-acetaminophen (PERCOCET) 5-325 mg per tablet   Yes No   Sig: TAKE 1 TABLET BY MOUTH EVERY 6 HOURS AS NEEDED FOR PAIN. RX MUST LAST UNTIL 2023.      Facility-Administered Medications: None     Discharge Medication List as of 2025  2:13 PM        CONTINUE these medications which have NOT CHANGED    Details   acetaminophen (TYLENOL) 500 mg tablet Take 500 mg by mouth every 6 (six) hours as needed for mild pain, Historical Med       albuterol (PROVENTIL HFA,VENTOLIN HFA) 90 mcg/act inhaler Inhale 2 puffs every 6 (six) hours as needed for wheezing, Historical Med      alfuzosin (UROXATRAL) 10 mg 24 hr tablet Take 10 mg by mouth daily, Historical Med      amLODIPine (NORVASC) 5 mg tablet Take 1 tablet (5 mg total) by mouth daily, Starting Tue 10/1/2024, Until Thu 10/31/2024, Normal      aspirin (ECOTRIN LOW STRENGTH) 81 mg EC tablet 81 mg daily, Starting Thu 1/20/2022, Historical Med      atorvastatin (LIPITOR) 40 mg tablet TAKE 20MG (ONE-HALF TABLET) BY MOUTH EVERY DAY FOR HYPERLIPIDEMIA, Historical Med      budesonide-formoterol (SYMBICORT) 160-4.5 mcg/act inhaler Inhale 2 puffs 2 (two) times a day Rinse mouth after use., Historical Med      CETIRIZINE HCL PO Take 1 tablet by mouth in the morning, Historical Med      chlorhexidine (PERIDEX) 0.12 % solution Apply 15 mL to the mouth or throat 4 (four) times a day (with meals and at bedtime) After meals and before bed, Starting Sat 2/19/2022, Print      Cholecalciferol 50 MCG (2000 UT) TABS TAKE ONE TABLET BY MOUTH EVERY DAY (FOR LOW VITAMIN D), Historical Med      Coenzyme Q10 100 MG TABS TAKE 400MG  BY MOUTH EVERY MORNING, Historical Med      Docusate Sodium (DSS) 100 MG CAPS 100 mg, Starting Fri 6/9/2023, Historical Med      famotidine (PEPCID) 20 mg tablet Take 1 tablet (20 mg total) by mouth 2 (two) times a day for 5 days, Starting Tue 11/19/2024, Until Sun 11/24/2024, Normal      ferrous gluconate (FERGON) 324 mg tablet Take 324 mg by mouth daily with breakfast, Historical Med      ferrous sulfate 325 (65 Fe) mg tablet 325 mg, Starting Wed 5/31/2023, Historical Med      finasteride (PROSCAR) 5 mg tablet Take 5 mg by mouth daily, Starting Thu 1/6/2022, Historical Med      fluticasone (FLONASE) 50 mcg/act nasal spray INSTILL 2 SPRAYS IN NOSTRIL(S) EVERY DAY AS NEEDED FOR NASAL CONGESTION, Historical Med      ibuprofen (MOTRIN) 800 mg tablet Take 1 tablet (800 mg total) by mouth 3 (three)  times a day, Starting Sat 12/9/2023, Normal      ketoconazole (NIZORAL) 2 % cream Historical Med      lactulose 20 g/30 mL Take 30 mL (20 g total) by mouth daily for 3 days, Starting Sat 8/12/2023, Until Tue 8/15/2023, Normal      latanoprost (XALATAN) 0.005 % ophthalmic solution INSTILL 1 DROP IN BOTH EYES AT BEDTIME FOR GLAUCOMA-PROTECT BOTTLE FROM LIGHT., Historical Med      lisinopril (ZESTRIL) 5 mg tablet Take 5 mg by mouth, Starting Thu 1/6/2022, Historical Med      magnesium gluconate (MAGONATE) 500 mg tablet Take 500 mg by mouth in the morning, Historical Med      metFORMIN (GLUCOPHAGE) 500 mg tablet Take 500 mg by mouth daily with breakfast, Starting Thu 1/6/2022, Historical Med      Multiple Vitamin (MULTIVITAMINS PO) TAKE ONE CAP/TAB BY MOUTH EVERY DAY FOR SUPPLEMENTATION, Historical Med      Omega-3 Fatty Acids (fish oil) 1,000 mg Take 1,000 mg by mouth daily, Historical Med      omeprazole (PriLOSEC) 20 mg delayed release capsule 20 mg, Starting Mon 4/11/2022, Historical Med      oxyCODONE-acetaminophen (PERCOCET) 5-325 mg per tablet TAKE 1 TABLET BY MOUTH EVERY 6 HOURS AS NEEDED FOR PAIN. RX MUST LAST UNTIL 6/17/2023., Historical Med           No discharge procedures on file.  ED SEPSIS DOCUMENTATION   Time reflects when diagnosis was documented in both MDM as applicable and the Disposition within this note       Time User Action Codes Description Comment    2/11/2025  2:13 PM Michael Garibay Add [E86.0] Dehydration                  Michael Garibay MD  02/11/25 2986

## 2025-04-07 ENCOUNTER — APPOINTMENT (EMERGENCY)
Dept: RADIOLOGY | Facility: HOSPITAL | Age: 80
End: 2025-04-07
Payer: COMMERCIAL

## 2025-04-07 ENCOUNTER — APPOINTMENT (EMERGENCY)
Dept: CT IMAGING | Facility: HOSPITAL | Age: 80
End: 2025-04-07
Payer: COMMERCIAL

## 2025-04-07 ENCOUNTER — HOSPITAL ENCOUNTER (EMERGENCY)
Facility: HOSPITAL | Age: 80
Discharge: HOME | End: 2025-04-07
Attending: EMERGENCY MEDICINE
Payer: COMMERCIAL

## 2025-04-07 VITALS
TEMPERATURE: 98 F | DIASTOLIC BLOOD PRESSURE: 73 MMHG | HEIGHT: 69 IN | WEIGHT: 183 LBS | SYSTOLIC BLOOD PRESSURE: 142 MMHG | OXYGEN SATURATION: 99 % | HEART RATE: 82 BPM | BODY MASS INDEX: 27.11 KG/M2 | RESPIRATION RATE: 20 BRPM

## 2025-04-07 DIAGNOSIS — K59.00 CONSTIPATION: ICD-10-CM

## 2025-04-07 DIAGNOSIS — R10.9 ABDOMINAL PAIN: Primary | ICD-10-CM

## 2025-04-07 LAB
ALBUMIN SERPL BCG-MCNC: 4.1 G/DL (ref 3.5–5)
ALP SERPL-CCNC: 53 U/L (ref 34–104)
ALT SERPL W P-5'-P-CCNC: 39 U/L (ref 7–52)
ANION GAP SERPL CALCULATED.3IONS-SCNC: 8 MMOL/L (ref 4–13)
AST SERPL W P-5'-P-CCNC: 25 U/L (ref 13–39)
BASOPHILS # BLD AUTO: 0.01 THOUSANDS/ÂΜL (ref 0–0.1)
BASOPHILS NFR BLD AUTO: 0 % (ref 0–1)
BILIRUB SERPL-MCNC: 0.29 MG/DL (ref 0.2–1)
BUN SERPL-MCNC: 29 MG/DL (ref 5–25)
CALCIUM SERPL-MCNC: 8.9 MG/DL (ref 8.4–10.2)
CARDIAC TROPONIN I PNL SERPL HS: <2 NG/L (ref ?–50)
CHLORIDE SERPL-SCNC: 102 MMOL/L (ref 96–108)
CO2 SERPL-SCNC: 24 MMOL/L (ref 21–32)
CREAT SERPL-MCNC: 1.05 MG/DL (ref 0.6–1.3)
EOSINOPHIL # BLD AUTO: 0.11 THOUSAND/ÂΜL (ref 0–0.61)
EOSINOPHIL NFR BLD AUTO: 3 % (ref 0–6)
ERYTHROCYTE [DISTWIDTH] IN BLOOD BY AUTOMATED COUNT: 14.6 % (ref 11.6–15.1)
GFR SERPL CREATININE-BSD FRML MDRD: 66 ML/MIN/1.73SQ M
GLUCOSE SERPL-MCNC: 88 MG/DL (ref 65–140)
HCT VFR BLD AUTO: 38.9 % (ref 36.5–49.3)
HGB BLD-MCNC: 12.2 G/DL (ref 12–17)
IMM GRANULOCYTES # BLD AUTO: 0.01 THOUSAND/UL (ref 0–0.2)
IMM GRANULOCYTES NFR BLD AUTO: 0 % (ref 0–2)
LIPASE SERPL-CCNC: 39 U/L (ref 11–82)
LYMPHOCYTES # BLD AUTO: 1.42 THOUSANDS/ÂΜL (ref 0.6–4.47)
LYMPHOCYTES NFR BLD AUTO: 38 % (ref 14–44)
MCH RBC QN AUTO: 22.4 PG (ref 26.8–34.3)
MCHC RBC AUTO-ENTMCNC: 31.4 G/DL (ref 31.4–37.4)
MCV RBC AUTO: 71 FL (ref 82–98)
MONOCYTES # BLD AUTO: 0.51 THOUSAND/ÂΜL (ref 0.17–1.22)
MONOCYTES NFR BLD AUTO: 14 % (ref 4–12)
NEUTROPHILS # BLD AUTO: 1.64 THOUSANDS/ÂΜL (ref 1.85–7.62)
NEUTS SEG NFR BLD AUTO: 45 % (ref 43–75)
NRBC BLD AUTO-RTO: 1 /100 WBCS
PLATELET # BLD AUTO: 190 THOUSANDS/UL (ref 149–390)
PMV BLD AUTO: 9.7 FL (ref 8.9–12.7)
POTASSIUM SERPL-SCNC: 3.7 MMOL/L (ref 3.5–5.3)
PROT SERPL-MCNC: 7.4 G/DL (ref 6.4–8.4)
RBC # BLD AUTO: 5.45 MILLION/UL (ref 3.88–5.62)
SODIUM SERPL-SCNC: 134 MMOL/L (ref 135–147)
WBC # BLD AUTO: 3.7 THOUSAND/UL (ref 4.31–10.16)

## 2025-04-07 PROCEDURE — 36415 COLL VENOUS BLD VENIPUNCTURE: CPT

## 2025-04-07 PROCEDURE — 99285 EMERGENCY DEPT VISIT HI MDM: CPT

## 2025-04-07 PROCEDURE — 99284 EMERGENCY DEPT VISIT MOD MDM: CPT

## 2025-04-07 PROCEDURE — 85025 COMPLETE CBC W/AUTO DIFF WBC: CPT

## 2025-04-07 PROCEDURE — 80053 COMPREHEN METABOLIC PANEL: CPT

## 2025-04-07 PROCEDURE — 74177 CT ABD & PELVIS W/CONTRAST: CPT

## 2025-04-07 PROCEDURE — 71045 X-RAY EXAM CHEST 1 VIEW: CPT

## 2025-04-07 PROCEDURE — 93005 ELECTROCARDIOGRAM TRACING: CPT

## 2025-04-07 PROCEDURE — 84484 ASSAY OF TROPONIN QUANT: CPT

## 2025-04-07 PROCEDURE — 83690 ASSAY OF LIPASE: CPT

## 2025-04-07 RX ORDER — FAMOTIDINE 20 MG/1
20 TABLET, FILM COATED ORAL ONCE
Status: COMPLETED | OUTPATIENT
Start: 2025-04-07 | End: 2025-04-07

## 2025-04-07 RX ORDER — MAGNESIUM HYDROXIDE/ALUMINUM HYDROXICE/SIMETHICONE 120; 1200; 1200 MG/30ML; MG/30ML; MG/30ML
30 SUSPENSION ORAL ONCE
Status: COMPLETED | OUTPATIENT
Start: 2025-04-07 | End: 2025-04-07

## 2025-04-07 RX ADMIN — IOHEXOL 100 ML: 350 INJECTION, SOLUTION INTRAVENOUS at 18:23

## 2025-04-07 RX ADMIN — FAMOTIDINE 20 MG: 20 TABLET, FILM COATED ORAL at 17:19

## 2025-04-07 RX ADMIN — ALUMINUM HYDROXIDE, MAGNESIUM HYDROXIDE, AND DIMETHICONE 30 ML: 200; 20; 200 SUSPENSION ORAL at 17:20

## 2025-04-07 NOTE — DISCHARGE INSTRUCTIONS
Take Colace daily to help with stool. If that does not work, may try Miralax instead. Follow up with primary care doctor this week. If symptoms worsen, return to the emergency department for re-evaluation.

## 2025-04-07 NOTE — ED ATTENDING ATTESTATION
4/7/2025  IMichelle DO, saw and evaluated the patient. I have discussed the patient with the resident/non-physician practitioner and agree with the resident's/non-physician practitioner's findings, Plan of Care, and MDM as documented in the resident's/non-physician practitioner's note, except where noted. All available labs and Radiology studies were reviewed.  I was present for key portions of any procedure(s) performed by the resident/non-physician practitioner and I was immediately available to provide assistance.       At this point I agree with the current assessment done in the Emergency Department.  I have conducted an independent evaluation of this patient a history and physical is as follows:    ED Course     Patient is an 79-year-old male past medical history of COPD, diabetes, hypertension, hyperlipidemia, acid reflux presenting for abdominal pain.  Patient notes left upper quadrant abdominal pain constant and radiating to the back for the last 7 to 10 days.  Notes subjective fevers and sweating at home in same timeframe.  States that the pain is better with moving his bowels.  States he had chest pain 10 days ago but that resolved.  Has seen GI for similar symptoms in the past and was given simethicone, no endoscopy as of yet.  Does take omeprazole at baseline.  Denies any rashes, vision changes, dysuria, nausea/vomiting/diarrhea/constipation, chest pain, shortness of breath, dizziness, leg pain or swelling.    Patient is well-appearing at bedside though with low-grade tachycardia but in no acute distress with mild left upper quadrant tenderness without guarding, equal radial pulses and no other significant physical exam findings.  Will obtain labs to assess her pancreatitis, electrolyte MIs, anemia, transaminitis, EKG troponin to assess for ACS or arrhythmia, CT to assess for small bowel obstruction, perforation, cholecystitis, diverticulitis or other intra-abdominal pathology, give pain  control and reassess.    Critical Care Time  Procedures

## 2025-04-07 NOTE — ED PROVIDER NOTES
"Time reflects when diagnosis was documented in both MDM as applicable and the Disposition within this note       Time User Action Codes Description Comment    4/7/2025  6:55 PM Hortensia Stanley Add [R10.9] Abdominal pain     4/7/2025  6:55 PM Hortensia Stanley Add [K59.00] Constipation           ED Disposition       ED Disposition   Discharge    Condition   Stable    Date/Time   Mon Apr 7, 2025  6:55 PM    Comment   Altaf Thurston discharge to home/self care.                   Assessment & Plan       Medical Decision Making  78 y/o male PMH of HTN, diabetes, COPD, GERD, and diverticulitis here for a 10 day history of left upper abdominal pain.  States he has had a few bouts of diverticulitis so he has recently been moved to a vegan diet.  States this pain feels slightly different and is also higher up in the abdomen than normal.  He does take omeprazole daily because he does have reflux.  Denies fever, chills, chest pain, shortness of breath, N/V/D, urinary symptoms.  Admits to bowel movements every couple days.  Patient is well-appearing on exam, in no distress.  Vitals are normal.  Labs are unremarkable.  Patient felt moderately improved after medications.  CT abdomen/pelvis showed \"No acute abdominal or pelvic pathology.   2) No bowel obstruction. Normal appendix. Evaluation for bowel inflammation elsewhere somewhat limited by underdistention and lack of oral contrast, however no convincing inflammatory changes. Please note mild inflammation may not be apparent. Stool throughout the colon, suggestive of constipation.  3) No ureteral or urinary bladder calculi. No hydronephrosis. No evidence of pyelonephritis.\"   Discussed findings with patient.  Discussed use of stool softeners/laxatives.  Discussed supportive care, follow-up, risks and return precautions.  Patient was agreeable to plan was discharged home.      Amount and/or Complexity of Data Reviewed  Labs: ordered. Decision-making details documented in ED " Course.  Radiology: ordered.    Risk  OTC drugs.  Prescription drug management.        ED Course as of 25   Mon  hs TnI 0hr: <2  No need for delta, symptoms have been ongoing    Comprehensive metabolic panel(!)  Grossly normal     WBC(!): 3.70  Potentially mildly decreased from baseline, otherwise normal    Patient reports feeling moderately improved       Medications   aluminum-magnesium hydroxide-simethicone (MAALOX) oral suspension 30 mL (30 mL Oral Given 25 172)   famotidine (PEPCID) tablet 20 mg (20 mg Oral Given 25 171)   iohexol (OMNIPAQUE) 350 MG/ML injection (MULTI-DOSE) 100 mL (100 mL Intravenous Given 25 1823)       ED Risk Strat Scores                                                History of Present Illness       Chief Complaint   Patient presents with    Abdominal Pain     Pt states hx of diverticulitis and having a flare up, pain in the L side of abd. Denies n/v/d       Past Medical History:   Diagnosis Date    Asthma     BPH (benign prostatic hyperplasia)     Dental disease     Diabetes mellitus (HCC)     GERD (gastroesophageal reflux disease)     Hypertension       Past Surgical History:   Procedure Laterality Date    COLONOSCOPY      HERNIA REPAIR      SUBMANDIBULAR DUCT STONE EXCISION N/A 2023    Procedure: PAROTID SIALOENDOSCOPY WITH WASHOUT;  Surgeon: Angel Dasilva MD;  Location: [facility];  Service: ENT    TONSILLECTOMY      UPPER GASTROINTESTINAL ENDOSCOPY        Family History   Problem Relation Age of Onset    Lupus Mother     Heart failure Father     Crohn's disease Sister       Social History     Tobacco Use    Smoking status: Former     Current packs/day: 0.00     Average packs/day: 0.3 packs/day for 27.6 years (6.9 ttl pk-yrs)     Types: Cigarettes     Start date: 1962     Quit date: 3/5/1990     Years since quittin.1    Smokeless tobacco: Never   Vaping Use    Vaping status: Never Used   Substance Use Topics     Alcohol use: Not Currently     Comment: Clean & Sober    Drug use: Not Currently     Types: Cocaine     Comment: Clean & Sober 25 yrs      E-Cigarette/Vaping    E-Cigarette Use Never User       E-Cigarette/Vaping Substances    Nicotine No       I have reviewed and agree with the history as documented.     Patient is an 78 y/o male PMH of HTN, diabetes, COPD, GERD, and diverticulitis here for a 10 day history of left upper abdominal pain.  States he has had a few bouts of diverticulitis so he has recently been moved to a vegan diet.  States this pain feels slightly different and is also higher up in the abdomen than normal.  He does take omeprazole daily because he does have reflux.  Denies fever, chills, chest pain, shortness of breath, N/V/D, urinary symptoms.  Admits to bowel movements every couple days.      Abdominal Pain  Associated symptoms: no chest pain, no chills, no cough, no diarrhea, no dysuria, no fever, no hematuria, no nausea, no shortness of breath, no sore throat and no vomiting        Review of Systems   Constitutional:  Negative for chills and fever.   HENT:  Negative for congestion, ear pain, rhinorrhea and sore throat.    Eyes:  Negative for pain and visual disturbance.   Respiratory:  Negative for cough, chest tightness, shortness of breath and wheezing.    Cardiovascular:  Negative for chest pain and palpitations.   Gastrointestinal:  Positive for abdominal pain. Negative for diarrhea, nausea and vomiting.   Genitourinary:  Negative for difficulty urinating, dysuria, flank pain, frequency, hematuria and urgency.   Musculoskeletal:  Negative for arthralgias, back pain, myalgias, neck pain and neck stiffness.   Skin:  Negative for color change and rash.   Neurological:  Negative for dizziness, seizures, syncope, weakness, light-headedness and headaches.   All other systems reviewed and are negative.      Objective       ED Triage Vitals [04/07/25 1522]   Temperature Pulse Blood Pressure  Respirations SpO2 Patient Position - Orthostatic VS   98 °F (36.7 °C) (!) 107 142/80 18 99 % Sitting      Temp Source Heart Rate Source BP Location FiO2 (%) Pain Score    Temporal Monitor Left arm -- --      Vitals      Date and Time Temp Pulse SpO2 Resp BP Pain Score FACES Pain Rating User   04/07/25 1900 -- 82 99 % 20 142/73 -- -- NW   04/07/25 1830 -- 85 98 % 21 148/78 -- -- NW   04/07/25 1749 -- 83 97 % 20 130/75 -- --    04/07/25 1522 98 °F (36.7 °C) 107 99 % 18 142/80 -- -- RO            Physical Exam  Vitals and nursing note reviewed.   Constitutional:       General: He is not in acute distress.     Appearance: Normal appearance. He is not ill-appearing, toxic-appearing or diaphoretic.   HENT:      Head: Normocephalic.   Eyes:      Extraocular Movements: Extraocular movements intact.      Conjunctiva/sclera: Conjunctivae normal.      Pupils: Pupils are equal, round, and reactive to light.   Cardiovascular:      Rate and Rhythm: Normal rate and regular rhythm.      Pulses: Normal pulses.      Heart sounds: Normal heart sounds.   Pulmonary:      Effort: Pulmonary effort is normal. No respiratory distress.      Breath sounds: Normal breath sounds. No stridor. No wheezing, rhonchi or rales.   Chest:      Chest wall: No tenderness.   Abdominal:      General: Abdomen is flat. There is no distension.      Palpations: Abdomen is soft.      Tenderness: There is abdominal tenderness in the left upper quadrant. There is no guarding or rebound.   Musculoskeletal:         General: Normal range of motion.      Cervical back: Normal range of motion and neck supple.   Skin:     General: Skin is warm and dry.      Capillary Refill: Capillary refill takes less than 2 seconds.   Neurological:      General: No focal deficit present.      Mental Status: He is alert and oriented to person, place, and time.   Psychiatric:         Mood and Affect: Mood normal.         Behavior: Behavior normal.         Thought Content: Thought  content normal.         Judgment: Judgment normal.         Results Reviewed       Procedure Component Value Units Date/Time    HS Troponin 0hr (reflex protocol) [[id number]]  (Normal) Collected: 04/07/25 1659    Lab Status: Final result Specimen: Blood from Arm, Right Updated: 04/07/25 1747     hs TnI 0hr <2 ng/L     Comprehensive metabolic panel [[id number]]  (Abnormal) Collected: 04/07/25 1659    Lab Status: Final result Specimen: Blood from Arm, Right Updated: 04/07/25 1743     Sodium 134 mmol/L      Potassium 3.7 mmol/L      Chloride 102 mmol/L      CO2 24 mmol/L      ANION GAP 8 mmol/L      BUN 29 mg/dL      Creatinine 1.05 mg/dL      Glucose 88 mg/dL      Calcium 8.9 mg/dL      AST 25 U/L      ALT 39 U/L      Alkaline Phosphatase 53 U/L      Total Protein 7.4 g/dL      Albumin 4.1 g/dL      Total Bilirubin 0.29 mg/dL      eGFR 66 ml/min/1.73sq m     Narrative:      [employer] guidelines for Chronic Kidney Disease (CKD):     Stage 1 with normal or high GFR (GFR > 90 mL/min/1.73 square meters)    Stage 2 Mild CKD (GFR = 60-89 mL/min/1.73 square meters)    Stage 3A Moderate CKD (GFR = 45-59 mL/min/1.73 square meters)    Stage 3B Moderate CKD (GFR = 30-44 mL/min/1.73 square meters)    Stage 4 Severe CKD (GFR = 15-29 mL/min/1.73 square meters)    Stage 5 End Stage CKD (GFR <15 mL/min/1.73 square meters)  Note: GFR calculation is accurate only with a steady state creatinine    Lipase [[id number]]  (Normal) Collected: 04/07/25 1659    Lab Status: Final result Specimen: Blood from Arm, Right Updated: 04/07/25 1743     Lipase 39 u/L     CBC and differential [[id number]]  (Abnormal) Collected: 04/07/25 1659    Lab Status: Final result Specimen: Blood from Arm, Right Updated: 04/07/25 1710     WBC 3.70 Thousand/uL      RBC 5.45 Million/uL      Hemoglobin 12.2 g/dL      Hematocrit 38.9 %      MCV 71 fL      MCH 22.4 pg      MCHC 31.4 g/dL      RDW 14.6 %      MPV 9.7 fL      Platelets 190 Thousands/uL       nRBC 1 /100 WBCs      Segmented % 45 %      Immature Grans % 0 %      Lymphocytes % 38 %      Monocytes % 14 %      Eosinophils Relative 3 %      Basophils Relative 0 %      Absolute Neutrophils 1.64 Thousands/µL      Absolute Immature Grans 0.01 Thousand/uL      Absolute Lymphocytes 1.42 Thousands/µL      Absolute Monocytes 0.51 Thousand/µL      Eosinophils Absolute 0.11 Thousand/µL      Basophils Absolute 0.01 Thousands/µL             CT abdomen pelvis with contrast   Final Interpretation by Zandra Sanabria MD ( [id number])      1) No acute abdominal or pelvic pathology.      2) No bowel obstruction. Normal appendix. Evaluation for bowel inflammation elsewhere somewhat limited by underdistention and lack of oral contrast, however no convincing inflammatory changes. Please note mild inflammation may not be apparent. Stool    throughout the colon, suggestive of constipation.      3) No ureteral or urinary bladder calculi. No hydronephrosis. No evidence of pyelonephritis.      4) Additional findings as above.                     Workstation performed: [id number]         XR chest 1 view portable   Final Interpretation by Elizabeth Valerio MD ( [id number])      No acute cardiopulmonary disease.            Workstation performed: [id number]             Procedures    ED Medication and Procedure Management   Prior to Admission Medications   Prescriptions Last Dose Informant Patient Reported? Taking?   CETIRIZINE HCL PO  Self Yes No   Sig: Take 1 tablet by mouth in the morning   Cholecalciferol 50 MCG (2000 UT) TABS  Self Yes No   Sig: TAKE ONE TABLET BY MOUTH EVERY DAY (FOR LOW VITAMIN D)   Coenzyme Q10 100 MG TABS  Self Yes No   Sig: TAKE 400MG  BY MOUTH EVERY MORNING   Docusate Sodium (DSS) 100 MG CAPS   Yes No   Si mg   Multiple Vitamin (MULTIVITAMINS PO)  Self Yes No   Sig: TAKE ONE CAP/TAB BY MOUTH EVERY DAY FOR SUPPLEMENTATION   Omega-3 Fatty Acids (fish oil) 1,000 mg   Yes No   Sig: Take 1,000 mg by mouth daily    acetaminophen (TYLENOL) 500 mg tablet   Yes No   Sig: Take 500 mg by mouth every 6 (six) hours as needed for mild pain   albuterol (PROVENTIL HFA,VENTOLIN HFA) 90 mcg/act inhaler  Self Yes No   Sig: Inhale 2 puffs every 6 (six) hours as needed for wheezing   alfuzosin (UROXATRAL) 10 mg 24 hr tablet   Yes No   Sig: Take 10 mg by mouth daily   amLODIPine (NORVASC) 5 mg tablet   No No   Sig: Take 1 tablet (5 mg total) by mouth daily   aspirin (ECOTRIN LOW STRENGTH) 81 mg EC tablet  Self Yes No   Si mg daily   atorvastatin (LIPITOR) 40 mg tablet   Yes No   Sig: TAKE 20MG (ONE-HALF TABLET) BY MOUTH EVERY DAY FOR HYPERLIPIDEMIA   budesonide-formoterol (SYMBICORT) 160-4.5 mcg/act inhaler  Self Yes No   Sig: Inhale 2 puffs 2 (two) times a day Rinse mouth after use.   chlorhexidine (PERIDEX) 0.12 % solution  Self No No   Sig: Apply 15 mL to the mouth or throat 4 (four) times a day (with meals and at bedtime) After meals and before bed   famotidine (PEPCID) 20 mg tablet   No No   Sig: Take 1 tablet (20 mg total) by mouth 2 (two) times a day for 5 days   ferrous gluconate (FERGON) 324 mg tablet  Self Yes No   Sig: Take 324 mg by mouth daily with breakfast   ferrous sulfate 325 (65 Fe) mg tablet   Yes No   Si mg   finasteride (PROSCAR) 5 mg tablet  Self Yes No   Sig: Take 5 mg by mouth daily   fluticasone (FLONASE) 50 mcg/act nasal spray  Self Yes No   Sig: INSTILL 2 SPRAYS IN NOSTRIL(S) EVERY DAY AS NEEDED FOR NASAL CONGESTION   ibuprofen (MOTRIN) 800 mg tablet   No No   Sig: Take 1 tablet (800 mg total) by mouth 3 (three) times a day   ketoconazole (NIZORAL) 2 % cream  Self Yes No   lactulose 20 g/30 mL   No No   Sig: Take 30 mL (20 g total) by mouth daily for 3 days   latanoprost (XALATAN) 0.005 % ophthalmic solution  Self Yes No   Sig: INSTILL 1 DROP IN BOTH EYES AT BEDTIME FOR GLAUCOMA-PROTECT BOTTLE FROM LIGHT.   lisinopril (ZESTRIL) 5 mg tablet  Self Yes No   Sig: Take 5 mg by mouth   magnesium gluconate  (MAGONATE) 500 mg tablet   Yes No   Sig: Take 500 mg by mouth in the morning   metFORMIN (GLUCOPHAGE) 500 mg tablet  Self Yes No   Sig: Take 500 mg by mouth daily with breakfast   omeprazole (PriLOSEC) 20 mg delayed release capsule  Self Yes No   Si mg   oxyCODONE-acetaminophen (PERCOCET) 5-325 mg per tablet   Yes No   Sig: TAKE 1 TABLET BY MOUTH EVERY 6 HOURS AS NEEDED FOR PAIN. RX MUST LAST UNTIL 2023.      Facility-Administered Medications: None     Patient's Medications   Discharge Prescriptions    No medications on file     No discharge procedures on file.  ED SEPSIS DOCUMENTATION   Time reflects when diagnosis was documented in both MDM as applicable and the Disposition within this note       Time User Action Codes Description Comment    2025  6:55 PM Hortensia Stanley [R10.9] Abdominal pain     2025  6:55 PM Hortensia Stanley [K59.00] Constipation                  Hortensia Stanley PA-C  25 1910

## 2025-04-08 LAB
ATRIAL RATE: 92 BPM
P AXIS: 64 DEGREES
PR INTERVAL: 156 MS
QRS AXIS: -84 DEGREES
QRSD INTERVAL: 102 MS
QT INTERVAL: 374 MS
QTC INTERVAL: 462 MS
T WAVE AXIS: 52 DEGREES
VENTRICULAR RATE: 92 BPM

## 2025-04-08 PROCEDURE — 93010 ELECTROCARDIOGRAM REPORT: CPT | Performed by: INTERNAL MEDICINE

## 2025-04-18 ENCOUNTER — APPOINTMENT (EMERGENCY)
Dept: RADIOLOGY | Facility: HOSPITAL | Age: 80
End: 2025-04-18
Payer: COMMERCIAL

## 2025-04-18 ENCOUNTER — HOSPITAL ENCOUNTER (EMERGENCY)
Facility: HOSPITAL | Age: 80
Discharge: HOME | End: 2025-04-18
Attending: EMERGENCY MEDICINE
Payer: COMMERCIAL

## 2025-04-18 VITALS
DIASTOLIC BLOOD PRESSURE: 73 MMHG | OXYGEN SATURATION: 98 % | HEART RATE: 89 BPM | RESPIRATION RATE: 20 BRPM | SYSTOLIC BLOOD PRESSURE: 132 MMHG | TEMPERATURE: 98.8 F

## 2025-04-18 DIAGNOSIS — J44.1 COPD EXACERBATION (HCC): Primary | ICD-10-CM

## 2025-04-18 LAB
ALBUMIN SERPL BCG-MCNC: 4.3 G/DL (ref 3.5–5)
ALP SERPL-CCNC: 60 U/L (ref 34–104)
ALT SERPL W P-5'-P-CCNC: 45 U/L (ref 7–52)
ANION GAP SERPL CALCULATED.3IONS-SCNC: 5 MMOL/L (ref 4–13)
AST SERPL W P-5'-P-CCNC: 29 U/L (ref 13–39)
BASOPHILS # BLD AUTO: 0.02 THOUSANDS/ÂΜL (ref 0–0.1)
BASOPHILS NFR BLD AUTO: 0 % (ref 0–1)
BILIRUB SERPL-MCNC: 0.3 MG/DL (ref 0.2–1)
BUN SERPL-MCNC: 31 MG/DL (ref 5–25)
CALCIUM SERPL-MCNC: 9.1 MG/DL (ref 8.4–10.2)
CARDIAC TROPONIN I PNL SERPL HS: <2 NG/L (ref ?–50)
CHLORIDE SERPL-SCNC: 104 MMOL/L (ref 96–108)
CO2 SERPL-SCNC: 29 MMOL/L (ref 21–32)
CREAT SERPL-MCNC: 1.08 MG/DL (ref 0.6–1.3)
EOSINOPHIL # BLD AUTO: 0.17 THOUSAND/ÂΜL (ref 0–0.61)
EOSINOPHIL NFR BLD AUTO: 3 % (ref 0–6)
ERYTHROCYTE [DISTWIDTH] IN BLOOD BY AUTOMATED COUNT: 14.7 % (ref 11.6–15.1)
GFR SERPL CREATININE-BSD FRML MDRD: 64 ML/MIN/1.73SQ M
GLUCOSE SERPL-MCNC: 80 MG/DL (ref 65–140)
HCT VFR BLD AUTO: 38.3 % (ref 36.5–49.3)
HGB BLD-MCNC: 11.7 G/DL (ref 12–17)
IMM GRANULOCYTES # BLD AUTO: 0.09 THOUSAND/UL (ref 0–0.2)
IMM GRANULOCYTES NFR BLD AUTO: 2 % (ref 0–2)
LYMPHOCYTES # BLD AUTO: 1.69 THOUSANDS/ÂΜL (ref 0.6–4.47)
LYMPHOCYTES NFR BLD AUTO: 30 % (ref 14–44)
MCH RBC QN AUTO: 22.2 PG (ref 26.8–34.3)
MCHC RBC AUTO-ENTMCNC: 30.5 G/DL (ref 31.4–37.4)
MCV RBC AUTO: 73 FL (ref 82–98)
MONOCYTES # BLD AUTO: 0.7 THOUSAND/ÂΜL (ref 0.17–1.22)
MONOCYTES NFR BLD AUTO: 12 % (ref 4–12)
NEUTROPHILS # BLD AUTO: 2.98 THOUSANDS/ÂΜL (ref 1.85–7.62)
NEUTS SEG NFR BLD AUTO: 53 % (ref 43–75)
NRBC BLD AUTO-RTO: 0 /100 WBCS
PLATELET # BLD AUTO: 214 THOUSANDS/UL (ref 149–390)
PMV BLD AUTO: 9.2 FL (ref 8.9–12.7)
POTASSIUM SERPL-SCNC: 3.7 MMOL/L (ref 3.5–5.3)
PROT SERPL-MCNC: 7.2 G/DL (ref 6.4–8.4)
RBC # BLD AUTO: 5.27 MILLION/UL (ref 3.88–5.62)
SODIUM SERPL-SCNC: 138 MMOL/L (ref 135–147)
WBC # BLD AUTO: 5.65 THOUSAND/UL (ref 4.31–10.16)

## 2025-04-18 PROCEDURE — 94640 AIRWAY INHALATION TREATMENT: CPT

## 2025-04-18 PROCEDURE — 99285 EMERGENCY DEPT VISIT HI MDM: CPT | Performed by: EMERGENCY MEDICINE

## 2025-04-18 PROCEDURE — 84484 ASSAY OF TROPONIN QUANT: CPT | Performed by: EMERGENCY MEDICINE

## 2025-04-18 PROCEDURE — 36415 COLL VENOUS BLD VENIPUNCTURE: CPT | Performed by: EMERGENCY MEDICINE

## 2025-04-18 PROCEDURE — 71045 X-RAY EXAM CHEST 1 VIEW: CPT

## 2025-04-18 PROCEDURE — 80053 COMPREHEN METABOLIC PANEL: CPT | Performed by: EMERGENCY MEDICINE

## 2025-04-18 PROCEDURE — 85025 COMPLETE CBC W/AUTO DIFF WBC: CPT | Performed by: EMERGENCY MEDICINE

## 2025-04-18 PROCEDURE — 99284 EMERGENCY DEPT VISIT MOD MDM: CPT

## 2025-04-18 PROCEDURE — 93005 ELECTROCARDIOGRAM TRACING: CPT

## 2025-04-18 PROCEDURE — 96374 THER/PROPH/DIAG INJ IV PUSH: CPT

## 2025-04-18 RX ORDER — PREDNISONE 20 MG/1
60 TABLET ORAL DAILY
Qty: 9 TABLET | Refills: 0 | Status: SHIPPED | OUTPATIENT
Start: 2025-04-18 | End: 2025-04-21

## 2025-04-18 RX ORDER — METHYLPREDNISOLONE SODIUM SUCCINATE 125 MG/2ML
125 INJECTION, POWDER, LYOPHILIZED, FOR SOLUTION INTRAMUSCULAR; INTRAVENOUS ONCE
Status: COMPLETED | OUTPATIENT
Start: 2025-04-18 | End: 2025-04-18

## 2025-04-18 RX ORDER — IPRATROPIUM BROMIDE AND ALBUTEROL SULFATE 2.5; .5 MG/3ML; MG/3ML
3 SOLUTION RESPIRATORY (INHALATION) ONCE
Status: COMPLETED | OUTPATIENT
Start: 2025-04-18 | End: 2025-04-18

## 2025-04-18 RX ORDER — AZITHROMYCIN 250 MG/1
TABLET, FILM COATED ORAL
Qty: 6 TABLET | Refills: 0 | Status: SHIPPED | OUTPATIENT
Start: 2025-04-18 | End: 2025-04-22

## 2025-04-18 RX ADMIN — IPRATROPIUM BROMIDE AND ALBUTEROL SULFATE 3 ML: 2.5; .5 SOLUTION RESPIRATORY (INHALATION) at 18:53

## 2025-04-18 RX ADMIN — METHYLPREDNISOLONE SODIUM SUCCINATE 125 MG: 125 INJECTION, POWDER, FOR SOLUTION INTRAMUSCULAR; INTRAVENOUS at 18:53

## 2025-04-18 NOTE — ED PROVIDER NOTES
Time reflects when diagnosis was documented in both MDM as applicable and the Disposition within this note       Time User Action Codes Description Comment    4/18/2025  8:11 PM Jose Mendoza Add [J44.1] COPD exacerbation (HCC)           ED Disposition       ED Disposition   Discharge    Condition   Stable    Date/Time   Fri Apr 18, 2025  8:11 PM    Comment   Altaf Bertrand discharge to home/self care.                   Assessment & Plan       Medical Decision Making  Amount and/or Complexity of Data Reviewed  Labs: ordered.  Radiology: ordered and independent interpretation performed.    Risk  Prescription drug management.             Medications   ipratropium-albuterol (DUO-NEB) 0.5-2.5 mg/3 mL inhalation solution 3 mL (3 mL Nebulization Given 4/18/25 1853)   methylPREDNISolone sodium succinate (Solu-MEDROL) injection 125 mg (125 mg Intravenous Given 4/18/25 1853)       ED Risk Strat Scores   HEART Risk Score      Flowsheet Row Most Recent Value   Heart Score Risk Calculator    History 0 Filed at: 04/18/2025 2010   ECG 0 Filed at: 04/18/2025 2010   Age 2 Filed at: 04/18/2025 2010   Risk Factors 1 Filed at: 04/18/2025 2010   Troponin 0 Filed at: 04/18/2025 2010   HEART Score 3 Filed at: 04/18/2025 2010          HEART Risk Score      Flowsheet Row Most Recent Value   Heart Score Risk Calculator    History 0 Filed at: 04/18/2025 2010   ECG 0 Filed at: 04/18/2025 2010   Age 2 Filed at: 04/18/2025 2010   Risk Factors 1 Filed at: 04/18/2025 2010   Troponin 0 Filed at: 04/18/2025 2010   HEART Score 3 Filed at: 04/18/2025 2010                      (ISAR) Identification of Seniors at Risk  Before the illness or injury that brought you to the Emergency, did you need someone to help you on a regular basis?: 0  In the last 24 hours, have you needed more help than usual?: 0  Have you been hospitalized for one or more nights during the past 6 months?: 0  In general, do you see well?: 0  In general, do you have serious  "problems with your memory?: 0  Do you take more than three different medications every day?: 0  ISAR Score: 0                                History of Present Illness       Chief Complaint   Patient presents with    Chemical Exposure     Pt states he was painting his garage yesterday and felt like he got a chest pain from it.        Past Medical History:   Diagnosis Date    Asthma     BPH (benign prostatic hyperplasia)     Dental disease     Diabetes mellitus (HCC)     GERD (gastroesophageal reflux disease)     Hypertension       Past Surgical History:   Procedure Laterality Date    COLONOSCOPY      HERNIA REPAIR      SUBMANDIBULAR DUCT STONE EXCISION N/A 2023    Procedure: PAROTID SIALOENDOSCOPY WITH WASHOUT;  Surgeon: Angel Dasilva MD;  Location: [facility];  Service: ENT    TONSILLECTOMY      UPPER GASTROINTESTINAL ENDOSCOPY        Family History   Problem Relation Age of Onset    Lupus Mother     Heart failure Father     Crohn's disease Sister       Social History     Tobacco Use    Smoking status: Former     Current packs/day: 0.00     Average packs/day: 0.3 packs/day for 27.6 years (6.9 ttl pk-yrs)     Types: Cigarettes     Start date: 1962     Quit date: 3/5/1990     Years since quittin.1    Smokeless tobacco: Never   Vaping Use    Vaping status: Never Used   Substance Use Topics    Alcohol use: Not Currently     Comment: Clean & Sober    Drug use: Not Currently     Types: Cocaine     Comment: Clean & Sober 25 yrs      E-Cigarette/Vaping    E-Cigarette Use Never User       E-Cigarette/Vaping Substances    Nicotine No       I have reviewed and agree with the history as documented.     Patient is an 79-year-old male.  He is a former smoker.  He has diabetes, BPH, COPD, and hypertension.  Yesterday he was painting.  Since then he has felt some \"irritation in his chest\".  Not really pain.  Not short of breath.  Developed a productive cough.  Used his inhalers at home.  He does not want things to " get worse.  No hemoptysis.  No calf pain unilateral leg swelling.  No fever or chills.        Review of Systems   Constitutional:  Negative for chills and fever.   HENT:  Negative for rhinorrhea and sore throat.    Eyes:  Negative for pain, redness and visual disturbance.   Respiratory:  Positive for cough. Negative for shortness of breath.    Cardiovascular:  Negative for chest pain and leg swelling.   Gastrointestinal:  Negative for abdominal pain, diarrhea and vomiting.   Endocrine: Negative for polydipsia and polyuria.   Genitourinary:  Negative for dysuria, frequency and hematuria.   Musculoskeletal:  Negative for back pain and neck pain.   Skin:  Negative for rash and wound.   Allergic/Immunologic: Negative for immunocompromised state.   Neurological:  Negative for weakness, numbness and headaches.   Psychiatric/Behavioral:  Negative for hallucinations and suicidal ideas.            Objective       ED Triage Vitals [04/18/25 1657]   Temperature Pulse Blood Pressure Respirations SpO2 Patient Position - Orthostatic VS   98.8 °F (37.1 °C) 100 164/77 20 99 % Sitting      Temp Source Heart Rate Source BP Location FiO2 (%) Pain Score    Temporal Monitor Left arm -- --      Vitals      Date and Time Temp Pulse SpO2 Resp BP Pain Score FACES Pain Rating User   04/18/25 1800 -- 89 98 % 20 132/73 -- -- EN   04/18/25 1657 98.8 °F (37.1 °C) 100 99 % 20 164/77 -- -- AS            Physical Exam  Vitals reviewed.   Constitutional:       General: He is not in acute distress.     Appearance: He is not toxic-appearing.   HENT:      Head: Normocephalic and atraumatic.      Nose: Nose normal.      Mouth/Throat:      Mouth: Mucous membranes are moist.   Eyes:      General:         Right eye: No discharge.         Left eye: No discharge.      Conjunctiva/sclera: Conjunctivae normal.   Cardiovascular:      Rate and Rhythm: Normal rate and regular rhythm.      Pulses: Normal pulses.      Heart sounds: Normal heart sounds. No murmur  heard.     No friction rub. No gallop.   Pulmonary:      Effort: Pulmonary effort is normal. No respiratory distress.      Breath sounds: Normal breath sounds. No stridor. No wheezing, rhonchi or rales.      Comments: Diminished breath sounds bilaterally.  Abdominal:      General: Bowel sounds are normal. There is no distension.      Palpations: Abdomen is soft.      Tenderness: There is no abdominal tenderness. There is no right CVA tenderness, left CVA tenderness, guarding or rebound.   Musculoskeletal:         General: No swelling, tenderness, deformity or signs of injury. Normal range of motion.      Cervical back: Normal range of motion and neck supple. No rigidity.      Right lower leg: No edema.      Left lower leg: No edema.      Comments: No calf pain or unilateral leg swelling   Skin:     General: Skin is warm and dry.      Coloration: Skin is not jaundiced or pale.      Findings: No bruising, erythema or rash.   Neurological:      General: No focal deficit present.      Mental Status: He is alert and oriented to person, place, and time.      Cranial Nerves: No facial asymmetry.      Sensory: No sensory deficit.      Motor: Motor function is intact.   Psychiatric:         Mood and Affect: Mood normal.         Behavior: Behavior normal.         Results Reviewed       Procedure Component Value Units Date/Time    HS Troponin I 4hr [[id number]]     Lab Status: No result Specimen: Blood     HS Troponin I 2hr [[id number]]     Lab Status: No result Specimen: Blood     HS Troponin 0hr (reflex protocol) [[id number]]  (Normal) Collected: 04/18/25 1852    Lab Status: Final result Specimen: Blood from Arm, Left Updated: 04/18/25 1927     hs TnI 0hr <2 ng/L     Comprehensive metabolic panel [[id number]]  (Abnormal) Collected: 04/18/25 1852    Lab Status: Final result Specimen: Blood from Arm, Left Updated: 04/18/25 1919     Sodium 138 mmol/L      Potassium 3.7 mmol/L      Chloride 104 mmol/L      CO2 29 mmol/L      ANION  GAP 5 mmol/L      BUN 31 mg/dL      Creatinine 1.08 mg/dL      Glucose 80 mg/dL      Calcium 9.1 mg/dL      AST 29 U/L      ALT 45 U/L      Alkaline Phosphatase 60 U/L      Total Protein 7.2 g/dL      Albumin 4.3 g/dL      Total Bilirubin 0.30 mg/dL      eGFR 64 ml/min/1.73sq m     Narrative:      [employer] guidelines for Chronic Kidney Disease (CKD):     Stage 1 with normal or high GFR (GFR > 90 mL/min/1.73 square meters)    Stage 2 Mild CKD (GFR = 60-89 mL/min/1.73 square meters)    Stage 3A Moderate CKD (GFR = 45-59 mL/min/1.73 square meters)    Stage 3B Moderate CKD (GFR = 30-44 mL/min/1.73 square meters)    Stage 4 Severe CKD (GFR = 15-29 mL/min/1.73 square meters)    Stage 5 End Stage CKD (GFR <15 mL/min/1.73 square meters)  Note: GFR calculation is accurate only with a steady state creatinine    CBC and differential [[id number]]  (Abnormal) Collected: 04/18/25 1852    Lab Status: Final result Specimen: Blood from Arm, Left Updated: 04/18/25 1904     WBC 5.65 Thousand/uL      RBC 5.27 Million/uL      Hemoglobin 11.7 g/dL      Hematocrit 38.3 %      MCV 73 fL      MCH 22.2 pg      MCHC 30.5 g/dL      RDW 14.7 %      MPV 9.2 fL      Platelets 214 Thousands/uL      nRBC 0 /100 WBCs      Segmented % 53 %      Immature Grans % 2 %      Lymphocytes % 30 %      Monocytes % 12 %      Eosinophils Relative 3 %      Basophils Relative 0 %      Absolute Neutrophils 2.98 Thousands/µL      Absolute Immature Grans 0.09 Thousand/uL      Absolute Lymphocytes 1.69 Thousands/µL      Absolute Monocytes 0.70 Thousand/µL      Eosinophils Absolute 0.17 Thousand/µL      Basophils Absolute 0.02 Thousands/µL             XR chest 1 view portable   ED Interpretation by Jose Mendoza MD (04/18 1949)   No infiltrates.  No CHF.          ECG 12 Lead Documentation Only    Date/Time: 4/18/2025 6:51 PM    Performed by: Jose Mendoza MD  Authorized by: Jose Mendoza MD    ECG reviewed by me, the ED Provider: yes     Patient location:  ED  Comments:      Normal sinus rhythm.  Low voltage QRS.  Incomplete right bundle branch block.  Left anterior fascicular block.  Abnormal EKG.  No acute ischemic ST or T wave changes.  No STEMI.      ED Medication and Procedure Management   Prior to Admission Medications   Prescriptions Last Dose Informant Patient Reported? Taking?   CETIRIZINE HCL PO  Self Yes No   Sig: Take 1 tablet by mouth in the morning   Cholecalciferol 50 MCG (2000 UT) TABS  Self Yes No   Sig: TAKE ONE TABLET BY MOUTH EVERY DAY (FOR LOW VITAMIN D)   Coenzyme Q10 100 MG TABS  Self Yes No   Sig: TAKE 400MG  BY MOUTH EVERY MORNING   Docusate Sodium (DSS) 100 MG CAPS   Yes No   Si mg   Multiple Vitamin (MULTIVITAMINS PO)  Self Yes No   Sig: TAKE ONE CAP/TAB BY MOUTH EVERY DAY FOR SUPPLEMENTATION   Omega-3 Fatty Acids (fish oil) 1,000 mg   Yes No   Sig: Take 1,000 mg by mouth daily   acetaminophen (TYLENOL) 500 mg tablet   Yes No   Sig: Take 500 mg by mouth every 6 (six) hours as needed for mild pain   albuterol (PROVENTIL HFA,VENTOLIN HFA) 90 mcg/act inhaler  Self Yes No   Sig: Inhale 2 puffs every 6 (six) hours as needed for wheezing   alfuzosin (UROXATRAL) 10 mg 24 hr tablet   Yes No   Sig: Take 10 mg by mouth daily   amLODIPine (NORVASC) 5 mg tablet   No No   Sig: Take 1 tablet (5 mg total) by mouth daily   aspirin (ECOTRIN LOW STRENGTH) 81 mg EC tablet  Self Yes No   Si mg daily   atorvastatin (LIPITOR) 40 mg tablet   Yes No   Sig: TAKE 20MG (ONE-HALF TABLET) BY MOUTH EVERY DAY FOR HYPERLIPIDEMIA   budesonide-formoterol (SYMBICORT) 160-4.5 mcg/act inhaler  Self Yes No   Sig: Inhale 2 puffs 2 (two) times a day Rinse mouth after use.   chlorhexidine (PERIDEX) 0.12 % solution  Self No No   Sig: Apply 15 mL to the mouth or throat 4 (four) times a day (with meals and at bedtime) After meals and before bed   famotidine (PEPCID) 20 mg tablet   No No   Sig: Take 1 tablet (20 mg total) by mouth 2 (two) times a day for  5 days   ferrous gluconate (FERGON) 324 mg tablet  Self Yes No   Sig: Take 324 mg by mouth daily with breakfast   ferrous sulfate 325 (65 Fe) mg tablet   Yes No   Si mg   finasteride (PROSCAR) 5 mg tablet  Self Yes No   Sig: Take 5 mg by mouth daily   fluticasone (FLONASE) 50 mcg/act nasal spray  Self Yes No   Sig: INSTILL 2 SPRAYS IN NOSTRIL(S) EVERY DAY AS NEEDED FOR NASAL CONGESTION   ibuprofen (MOTRIN) 800 mg tablet   No No   Sig: Take 1 tablet (800 mg total) by mouth 3 (three) times a day   ketoconazole (NIZORAL) 2 % cream  Self Yes No   lactulose 20 g/30 mL   No No   Sig: Take 30 mL (20 g total) by mouth daily for 3 days   latanoprost (XALATAN) 0.005 % ophthalmic solution  Self Yes No   Sig: INSTILL 1 DROP IN BOTH EYES AT BEDTIME FOR GLAUCOMA-PROTECT BOTTLE FROM LIGHT.   lisinopril (ZESTRIL) 5 mg tablet  Self Yes No   Sig: Take 5 mg by mouth   magnesium gluconate (MAGONATE) 500 mg tablet   Yes No   Sig: Take 500 mg by mouth in the morning   metFORMIN (GLUCOPHAGE) 500 mg tablet  Self Yes No   Sig: Take 500 mg by mouth daily with breakfast   omeprazole (PriLOSEC) 20 mg delayed release capsule  Self Yes No   Si mg   oxyCODONE-acetaminophen (PERCOCET) 5-325 mg per tablet   Yes No   Sig: TAKE 1 TABLET BY MOUTH EVERY 6 HOURS AS NEEDED FOR PAIN. RX MUST LAST UNTIL 2023.      Facility-Administered Medications: None     Patient's Medications   Discharge Prescriptions    AZITHROMYCIN (ZITHROMAX) 250 MG TABLET    Take 2 tablets today then 1 tablet daily x 4 days       Start Date: 2025 End Date: 2025       Order Dose: --       Quantity: 6 tablet    Refills: 0    PREDNISONE 20 MG TABLET    Take 3 tablets (60 mg total) by mouth daily for 3 days       Start Date: 2025 End Date: 2025       Order Dose: 60 mg       Quantity: 9 tablet    Refills: 0     No discharge procedures on file.  ED SEPSIS DOCUMENTATION   Time reflects when diagnosis was documented in both MDM as applicable and the  Disposition within this note       Time User Action Codes Description Comment    4/18/2025  8:11 PM Jose Mendoza Add [J44.1] COPD exacerbation (HCC)                  Jose Mendoza MD  04/18/25 2012

## 2025-04-19 LAB
ATRIAL RATE: 92 BPM
P AXIS: 70 DEGREES
PR INTERVAL: 152 MS
QRS AXIS: -80 DEGREES
QRSD INTERVAL: 102 MS
QT INTERVAL: 366 MS
QTC INTERVAL: 452 MS
T WAVE AXIS: 43 DEGREES
VENTRICULAR RATE: 92 BPM

## 2025-04-19 PROCEDURE — 93010 ELECTROCARDIOGRAM REPORT: CPT | Performed by: STUDENT IN AN ORGANIZED HEALTH CARE EDUCATION/TRAINING PROGRAM

## (undated) DEVICE — PENCIL ELECTROSURG E-Z CLEAN -0035H

## (undated) DEVICE — GLOVE SRG BIOGEL 7.5

## (undated) DEVICE — SYRINGE 10ML LL

## (undated) DEVICE — SPONGE CHERRY 1/2IN

## (undated) DEVICE — ELECTRODE NEEDLE MEGAFINE 2IN E-Z CLEAN MEGADYNE -0118

## (undated) DEVICE — BIPOLAR CORD DISP

## (undated) DEVICE — SYRINGE 20ML LL

## (undated) DEVICE — BETHLEHEM UNIVERSAL OUTPATIENT: Brand: CARDINAL HEALTH

## (undated) DEVICE — INTENDED FOR TISSUE SEPARATION, AND OTHER PROCEDURES THAT REQUIRE A SHARP SURGICAL BLADE TO PUNCTURE OR CUT.: Brand: BARD-PARKER ® CARBON RIB-BACK BLADES

## (undated) DEVICE — SKIN MARKER DUAL TIP WITH RULER CAP, FLEXIBLE RULER AND LABELS: Brand: DEVON

## (undated) DEVICE — SPECIMEN CONTAINER STERILE PEEL PACK

## (undated) DEVICE — TUBING SUCTION 5MM X 12 FT

## (undated) DEVICE — Device: Brand: MEDEX

## (undated) DEVICE — IV CATH INTROCAN 18G X 1 1/4 SAFETY

## (undated) DEVICE — SHEATH 1912000 5PK 4MM/0DEG STORZ XOMED: Brand: ENDO-SCRUB®